# Patient Record
Sex: MALE | Race: WHITE | Employment: OTHER | URBAN - METROPOLITAN AREA
[De-identification: names, ages, dates, MRNs, and addresses within clinical notes are randomized per-mention and may not be internally consistent; named-entity substitution may affect disease eponyms.]

---

## 2017-05-04 ENCOUNTER — HOSPITAL ENCOUNTER (EMERGENCY)
Facility: HOSPITAL | Age: 67
Discharge: HOME | End: 2017-05-04
Attending: EMERGENCY MEDICINE
Payer: COMMERCIAL

## 2017-05-04 ENCOUNTER — HOSPITAL ENCOUNTER (OUTPATIENT)
Age: 67
Discharge: STILL ADMITTED OR ADMINISTRATIVE | End: 2017-05-04
Payer: MEDICARE

## 2017-05-04 ENCOUNTER — APPOINTMENT (OUTPATIENT)
Dept: CT IMAGING | Facility: HOSPITAL | Age: 67
End: 2017-05-04
Attending: EMERGENCY MEDICINE
Payer: COMMERCIAL

## 2017-05-04 VITALS
DIASTOLIC BLOOD PRESSURE: 66 MMHG | HEART RATE: 72 BPM | TEMPERATURE: 98 F | WEIGHT: 210 LBS | BODY MASS INDEX: 30.06 KG/M2 | OXYGEN SATURATION: 97 % | HEIGHT: 70 IN | RESPIRATION RATE: 18 BRPM | SYSTOLIC BLOOD PRESSURE: 116 MMHG

## 2017-05-04 DIAGNOSIS — K52.9 ACUTE COLITIS: ICD-10-CM

## 2017-05-04 DIAGNOSIS — R10.9 ABDOMINAL PAIN OF UNKNOWN ETIOLOGY: Primary | ICD-10-CM

## 2017-05-04 PROCEDURE — 81003 URINALYSIS AUTO W/O SCOPE: CPT | Performed by: EMERGENCY MEDICINE

## 2017-05-04 PROCEDURE — 99285 EMERGENCY DEPT VISIT HI MDM: CPT

## 2017-05-04 PROCEDURE — 80048 BASIC METABOLIC PNL TOTAL CA: CPT | Performed by: EMERGENCY MEDICINE

## 2017-05-04 PROCEDURE — 36415 COLL VENOUS BLD VENIPUNCTURE: CPT

## 2017-05-04 PROCEDURE — 74177 CT ABD & PELVIS W/CONTRAST: CPT | Performed by: EMERGENCY MEDICINE

## 2017-05-04 PROCEDURE — 85025 COMPLETE CBC W/AUTO DIFF WBC: CPT | Performed by: EMERGENCY MEDICINE

## 2017-05-04 RX ORDER — CIPROFLOXACIN 500 MG/1
500 TABLET, FILM COATED ORAL 2 TIMES DAILY
Qty: 20 TABLET | Refills: 0 | Status: SHIPPED | OUTPATIENT
Start: 2017-05-04 | End: 2017-05-14

## 2017-05-04 RX ORDER — METRONIDAZOLE 500 MG/1
500 TABLET ORAL 3 TIMES DAILY
Qty: 30 TABLET | Refills: 0 | Status: SHIPPED | OUTPATIENT
Start: 2017-05-04 | End: 2017-05-14

## 2017-05-04 NOTE — ED NOTES
Pt standing at bedside, appear in no apparent distress, awaiting consult with GI. No new complaints at this time.

## 2017-05-04 NOTE — ED PROVIDER NOTES
Patient Seen in: [facility] AND [facility] Emergency Department    History   Patient presents with:  Abdomen/Flank Pain (GI/)    Stated Complaint: abd pain s/p colonoscopy    HPI    Patient presents the emergency department complaining of left-sided abdominal Current:/66 mmHg  Pulse 72  Temp(Src) 97.8 °F (36.6 °C) (Oral)  Resp 18  Ht 177.8 cm (5' 10\")  Wt 95.255 kg  BMI 30.13 kg/m2  SpO2 97%        Physical Exam   Constitutional: He is oriented to person, place, and time.  He appears well-developed Viewed and reviewed by myself and findings discussed with patient including need for follow up  I spoke with gastroenterology to perform this procedure and relayed the  CT findings to him.   He recommended placing her on Cipro and Flagyl and having follow-u

## 2017-05-04 NOTE — ED NOTES
Discharge instructions given and reviewed with patient and family member, told to follow up with pmd and GI as advised, medication as told side effects discussed. Told to avoid alcohol while taking metronidazole.   Will return or seek help with any new or

## 2017-08-04 ENCOUNTER — APPOINTMENT (OUTPATIENT)
Dept: CT IMAGING | Facility: HOSPITAL | Age: 67
End: 2017-08-04
Attending: EMERGENCY MEDICINE
Payer: COMMERCIAL

## 2017-08-04 ENCOUNTER — HOSPITAL ENCOUNTER (OUTPATIENT)
Age: 67
Discharge: ANOTHER HOSPITAL | End: 2017-08-04
Attending: EMERGENCY MEDICINE
Payer: COMMERCIAL

## 2017-08-04 ENCOUNTER — HOSPITAL ENCOUNTER (EMERGENCY)
Facility: HOSPITAL | Age: 67
Discharge: HOME | End: 2017-08-04
Attending: EMERGENCY MEDICINE
Payer: COMMERCIAL

## 2017-08-04 VITALS
HEIGHT: 71 IN | HEART RATE: 78 BPM | RESPIRATION RATE: 18 BRPM | SYSTOLIC BLOOD PRESSURE: 122 MMHG | WEIGHT: 203 LBS | TEMPERATURE: 98 F | BODY MASS INDEX: 28.42 KG/M2 | DIASTOLIC BLOOD PRESSURE: 57 MMHG | OXYGEN SATURATION: 96 %

## 2017-08-04 VITALS
WEIGHT: 204 LBS | DIASTOLIC BLOOD PRESSURE: 56 MMHG | RESPIRATION RATE: 20 BRPM | BODY MASS INDEX: 28.56 KG/M2 | HEIGHT: 71 IN | OXYGEN SATURATION: 96 % | SYSTOLIC BLOOD PRESSURE: 137 MMHG | TEMPERATURE: 98 F | HEART RATE: 69 BPM

## 2017-08-04 DIAGNOSIS — R59.0 LYMPHADENOPATHY, POSTAURICULAR: Primary | ICD-10-CM

## 2017-08-04 DIAGNOSIS — R51.9 NONINTRACTABLE HEADACHE, UNSPECIFIED CHRONICITY PATTERN, UNSPECIFIED HEADACHE TYPE: Primary | ICD-10-CM

## 2017-08-04 LAB
BASOPHILS # BLD: 0.1 K/UL (ref 0–0.2)
BASOPHILS NFR BLD: 1 %
EOSINOPHIL # BLD: 0 K/UL (ref 0–0.7)
EOSINOPHIL NFR BLD: 0 %
ERYTHROCYTE [DISTWIDTH] IN BLOOD BY AUTOMATED COUNT: 13.6 % (ref 11–15)
HCT VFR BLD AUTO: 48.7 % (ref 41–52)
HGB BLD-MCNC: 16.7 G/DL (ref 13.5–17.5)
LYMPHOCYTES # BLD: 1.3 K/UL (ref 1–4)
LYMPHOCYTES NFR BLD: 14 %
MCH RBC QN AUTO: 32.5 PG (ref 27–32)
MCHC RBC AUTO-ENTMCNC: 34.3 G/DL (ref 32–37)
MCV RBC AUTO: 94.7 FL (ref 80–100)
MONOCYTES # BLD: 0.6 K/UL (ref 0–1)
MONOCYTES NFR BLD: 7 %
NEUTROPHILS # BLD AUTO: 7 K/UL (ref 1.8–7.7)
NEUTROPHILS NFR BLD: 78 %
PLATELET # BLD AUTO: 211 K/UL (ref 140–400)
PMV BLD AUTO: 8 FL (ref 7.4–10.3)
RBC # BLD AUTO: 5.14 M/UL (ref 4.5–5.9)
WBC # BLD AUTO: 9 K/UL (ref 4–11)

## 2017-08-04 PROCEDURE — 86618 LYME DISEASE ANTIBODY: CPT | Performed by: EMERGENCY MEDICINE

## 2017-08-04 PROCEDURE — 36415 COLL VENOUS BLD VENIPUNCTURE: CPT

## 2017-08-04 PROCEDURE — 70486 CT MAXILLOFACIAL W/O DYE: CPT | Performed by: EMERGENCY MEDICINE

## 2017-08-04 PROCEDURE — 99213 OFFICE O/P EST LOW 20 MIN: CPT

## 2017-08-04 PROCEDURE — 99284 EMERGENCY DEPT VISIT MOD MDM: CPT

## 2017-08-04 PROCEDURE — 85025 COMPLETE CBC W/AUTO DIFF WBC: CPT | Performed by: EMERGENCY MEDICINE

## 2017-08-04 PROCEDURE — 99215 OFFICE O/P EST HI 40 MIN: CPT

## 2017-08-04 NOTE — ED NOTES
Patient sent from immediate care complains of a lump on right side of neck that started last night. Per pt, \"lump is smaller today than last night\". Per pt, had chills last night, but didn't check his fever.  Checked fever before coming here, states that

## 2017-08-04 NOTE — ED NOTES
Seen and examined by dr Kirstin Nelson, pt to go to ed for further eval and management, report called to [address] Suzy Jerez, rn

## 2017-08-04 NOTE — ED NOTES
Pt also c/o tactile fever, also c/o muscle soreness to left arm, + skin redness to left lower leg poss insect bite

## 2017-08-04 NOTE — ED PROVIDER NOTES
Patient Seen in: [facility] [place]    History   Patient presents with:  Headache (neurologic)    Stated Complaint: bump back neck    HPI    Patient is a 76-year-old male who states last night he had a rather severe headache.   It mastoid area. Right Ear: External ear normal.   Left Ear: External ear normal.   Nose: Nose normal.   Mouth/Throat: Oropharynx is clear and moist.   Eyes: Conjunctivae and EOM are normal. Pupils are equal, round, and reactive to light.    Neck: Neck supp

## 2017-08-04 NOTE — ED INITIAL ASSESSMENT (HPI)
Sent from [facility] with fever, dizziness right side of neck lump, possible cellulitis to left lower leg due to insect bite, Dr Nicole Moralse requested CT of sinus, also c/o left shoulder pain

## 2017-08-04 NOTE — ED PROVIDER NOTES
Patient Seen in: [facility] AND [facility] Emergency Department    History   Patient presents with:  Cough/URI    Stated Complaint: Sent from [facility] with fever, dizziness right side of neck lump, possible celluliti*    HPI    Patient is a 70-year-old male who was s Current:/56   Pulse 69   Temp 97.9 °F (36.6 °C) (Oral)   Resp 20   Ht 180.3 cm (5' 11\")   Wt 92.5 kg   SpO2 96%   BMI 28.45 kg/m²         Physical Exam   Constitutional: He is oriented to person, place, and time.  He appears well-developed and well-n Disposition and Plan     Clinical Impression:  Lymphadenopathy, postauricular  (primary encounter diagnosis)    Disposition:  Discharge    Follow-up:  Jeana Owens MD  [address]  [address] [facility] [phone]    In 3 days        Medicatio

## 2017-08-08 LAB — B BURGDOR IGG+IGM SER QL: NEGATIVE

## 2018-02-26 ENCOUNTER — OFFICE VISIT (OUTPATIENT)
Dept: DERMATOLOGY CLINIC | Facility: CLINIC | Age: 68
End: 2018-02-26

## 2018-02-26 DIAGNOSIS — L40.9 PSORIASIS: Primary | ICD-10-CM

## 2018-02-26 DIAGNOSIS — L70.8 OTHER ACNE: ICD-10-CM

## 2018-02-26 DIAGNOSIS — B35.3 TINEA PEDIS OF RIGHT FOOT: ICD-10-CM

## 2018-02-26 DIAGNOSIS — L40.8 INVERSE PSORIASIS: ICD-10-CM

## 2018-02-26 DIAGNOSIS — L30.9 DERMATITIS: ICD-10-CM

## 2018-02-26 PROCEDURE — 99204 OFFICE O/P NEW MOD 45 MIN: CPT | Performed by: DERMATOLOGY

## 2018-02-26 PROCEDURE — G0463 HOSPITAL OUTPT CLINIC VISIT: HCPCS | Performed by: DERMATOLOGY

## 2018-02-26 RX ORDER — TERBINAFINE HYDROCHLORIDE 250 MG/1
250 TABLET ORAL DAILY
Qty: 14 TABLET | Refills: 0 | Status: SHIPPED | OUTPATIENT
Start: 2018-02-26 | End: 2018-03-12

## 2018-02-26 RX ORDER — DOXYCYCLINE HYCLATE 100 MG/1
100 CAPSULE ORAL 2 TIMES DAILY
Qty: 60 CAPSULE | Refills: 6 | Status: SHIPPED | OUTPATIENT
Start: 2018-02-26 | End: 2018-03-28

## 2018-02-26 RX ORDER — TACROLIMUS 1 MG/G
OINTMENT TOPICAL
Qty: 100 G | Refills: 12 | Status: SHIPPED | OUTPATIENT
Start: 2018-02-26 | End: 2021-02-01

## 2018-02-26 RX ORDER — FLUOCINONIDE 0.5 MG/ML
SOLUTION TOPICAL
Qty: 60 ML | Refills: 12 | Status: SHIPPED | OUTPATIENT
Start: 2018-02-26 | End: 2018-12-14

## 2018-03-11 NOTE — PROGRESS NOTES
Moody Yap is a 75year old male. HPI:     CC:  Patient presents with:  Rash: New pt presenting with rash to scalp, posterior neck, groin area, bilateral under arms and legs. c/o redness, swelling, hairloss, and itching.   Previously halobetasol and enb Occupational History  None on file     Social History Main Topics   Smoking status: Heavy Tobacco Smoker  1.00 Packs/day  For 40.00 Years     Smokeless tobacco: Current User    Alcohol use Yes  1.2 oz/week    2 Cans of beer per week         Drug use: N History, medications, allergies reviewed as noted. ROS:  Denies any other systemic complaints. No new or changeing lesions other than noted above. No fevers, chills, night sweats, unusual sun sensitivity. No other skin complaints.         History, me Fluocinonide 0.05 % External Solution 60 mL 12      Sig: Use bid to scalp             Psoriasis  (primary encounter diagnosis)  Inverse psoriasis  Dermatitis  Tinea pedis of right foot  Other acne    See details on map.       Remarkable for:    Psoriasis

## 2018-03-15 ENCOUNTER — LAB ENCOUNTER (OUTPATIENT)
Dept: LAB | Age: 68
End: 2018-03-15
Attending: INTERNAL MEDICINE
Payer: MEDICARE

## 2018-03-15 DIAGNOSIS — E55.9 VITAMIN D DEFICIENCY: ICD-10-CM

## 2018-03-15 DIAGNOSIS — Z12.5 PROSTATE CANCER SCREENING: ICD-10-CM

## 2018-03-15 DIAGNOSIS — Z00.00 PHYSICAL EXAM, ANNUAL: ICD-10-CM

## 2018-03-15 LAB
ALBUMIN SERPL BCP-MCNC: 4.3 G/DL (ref 3.5–4.8)
ALBUMIN/GLOB SERPL: 1.6 {RATIO} (ref 1–2)
ALP SERPL-CCNC: 91 U/L (ref 32–100)
ALT SERPL-CCNC: 20 U/L (ref 17–63)
ANION GAP SERPL CALC-SCNC: 10 MMOL/L (ref 0–18)
AST SERPL-CCNC: 23 U/L (ref 15–41)
BASOPHILS # BLD: 0.1 K/UL (ref 0–0.2)
BASOPHILS NFR BLD: 1 %
BILIRUB SERPL-MCNC: 0.9 MG/DL (ref 0.3–1.2)
BUN SERPL-MCNC: 16 MG/DL (ref 8–20)
BUN/CREAT SERPL: 15.8 (ref 10–20)
CALCIUM SERPL-MCNC: 9.5 MG/DL (ref 8.5–10.5)
CHLORIDE SERPL-SCNC: 103 MMOL/L (ref 95–110)
CHOLEST SERPL-MCNC: 186 MG/DL (ref 110–200)
CO2 SERPL-SCNC: 25 MMOL/L (ref 22–32)
CREAT SERPL-MCNC: 1.01 MG/DL (ref 0.5–1.5)
EOSINOPHIL # BLD: 0.3 K/UL (ref 0–0.7)
EOSINOPHIL NFR BLD: 4 %
ERYTHROCYTE [DISTWIDTH] IN BLOOD BY AUTOMATED COUNT: 13.1 % (ref 11–15)
GLOBULIN PLAS-MCNC: 2.7 G/DL (ref 2.5–3.7)
GLUCOSE SERPL-MCNC: 82 MG/DL (ref 70–99)
HCT VFR BLD AUTO: 52.2 % (ref 41–52)
HDLC SERPL-MCNC: 31 MG/DL
HGB BLD-MCNC: 18 G/DL (ref 13.5–17.5)
LDLC SERPL CALC-MCNC: 128 MG/DL (ref 0–99)
LYMPHOCYTES # BLD: 2.2 K/UL (ref 1–4)
LYMPHOCYTES NFR BLD: 30 %
MCH RBC QN AUTO: 32.9 PG (ref 27–32)
MCHC RBC AUTO-ENTMCNC: 34.6 G/DL (ref 32–37)
MCV RBC AUTO: 95.3 FL (ref 80–100)
MONOCYTES # BLD: 0.7 K/UL (ref 0–1)
MONOCYTES NFR BLD: 9 %
NEUTROPHILS # BLD AUTO: 4.2 K/UL (ref 1.8–7.7)
NEUTROPHILS NFR BLD: 56 %
NONHDLC SERPL-MCNC: 155 MG/DL
OSMOLALITY UR CALC.SUM OF ELEC: 286 MOSM/KG (ref 275–295)
PATIENT FASTING: YES
PLATELET # BLD AUTO: 289 K/UL (ref 140–400)
PMV BLD AUTO: 8.4 FL (ref 7.4–10.3)
POTASSIUM SERPL-SCNC: 4.2 MMOL/L (ref 3.3–5.1)
PROT SERPL-MCNC: 7 G/DL (ref 5.9–8.4)
PSA SERPL-MCNC: 0.9 NG/ML (ref 0–4)
RBC # BLD AUTO: 5.47 M/UL (ref 4.5–5.9)
SODIUM SERPL-SCNC: 138 MMOL/L (ref 136–144)
TRIGL SERPL-MCNC: 136 MG/DL (ref 1–149)
TSH SERPL-ACNC: 3.09 UIU/ML (ref 0.45–5.33)
WBC # BLD AUTO: 7.5 K/UL (ref 4–11)

## 2018-03-15 PROCEDURE — 82306 VITAMIN D 25 HYDROXY: CPT

## 2018-03-15 PROCEDURE — 85025 COMPLETE CBC W/AUTO DIFF WBC: CPT

## 2018-03-15 PROCEDURE — 80061 LIPID PANEL: CPT

## 2018-03-15 PROCEDURE — 36415 COLL VENOUS BLD VENIPUNCTURE: CPT

## 2018-03-15 PROCEDURE — 80053 COMPREHEN METABOLIC PANEL: CPT

## 2018-03-15 PROCEDURE — 84443 ASSAY THYROID STIM HORMONE: CPT

## 2018-03-16 LAB — 25(OH)D3 SERPL-MCNC: 18.6 NG/ML

## 2018-03-19 ENCOUNTER — OFFICE VISIT (OUTPATIENT)
Dept: DERMATOLOGY CLINIC | Facility: CLINIC | Age: 68
End: 2018-03-19

## 2018-03-19 DIAGNOSIS — L40.9 PSORIASIS: ICD-10-CM

## 2018-03-19 DIAGNOSIS — L40.8 INVERSE PSORIASIS: Primary | ICD-10-CM

## 2018-03-19 DIAGNOSIS — L70.8 OTHER ACNE: ICD-10-CM

## 2018-03-19 DIAGNOSIS — D23.9 BENIGN NEOPLASM OF SKIN, UNSPECIFIED LOCATION: ICD-10-CM

## 2018-03-19 DIAGNOSIS — L70.0 ACNE VULGARIS: ICD-10-CM

## 2018-03-19 PROCEDURE — 99213 OFFICE O/P EST LOW 20 MIN: CPT | Performed by: DERMATOLOGY

## 2018-03-19 PROCEDURE — G0463 HOSPITAL OUTPT CLINIC VISIT: HCPCS | Performed by: DERMATOLOGY

## 2018-03-19 RX ORDER — CLOBETASOL PROPIONATE 0.46 MG/ML
1 SOLUTION TOPICAL 2 TIMES DAILY
Qty: 50 ML | Refills: 6 | Status: SHIPPED | OUTPATIENT
Start: 2018-03-19 | End: 2018-06-11

## 2018-03-19 RX ORDER — CLOBETASOL PROPIONATE 0.46 MG/ML
1 SOLUTION TOPICAL 2 TIMES DAILY
Qty: 50 ML | Refills: 6 | Status: SHIPPED | OUTPATIENT
Start: 2018-03-19 | End: 2018-03-19

## 2018-04-01 NOTE — PROGRESS NOTES
Red Davis is a 75year old male. HPI:     CC:  Patient presents with:  Psoriasis: LOV: 02/26/18. Pt presents for f/u psoriasis, pt states he sees no changes in condition. Pt states one of the oral medications is \"causing insomnia\".         Allergies: file     Other Topics Concern    Caffeine Concern Yes    Comment: soda 4 cans a day    Exercise No    Seat Belt No    Special Diet No    Stress Concern No    Weight Concern No    Pt has a pacemaker No    Pt has a defibrillator No    Reaction to local anest sweats, unusual sun sensitivity. No other skin complaints. History, medications, allergies reviewed as noted. Physical Examination:     Well-developed well-nourished patient alert oriented in no acute distress.   Exam total-body performed, inc possibly resuming Enbrel discussed. Patient does not wish to be on Biologics presently. Discussed pros and cons of other systemic medications including Otezla. If worsening consider with secondary folliculitis.   Aggressive management of scalp add doxycy needed

## 2018-04-06 ENCOUNTER — TELEPHONE (OUTPATIENT)
Dept: DERMATOLOGY CLINIC | Facility: CLINIC | Age: 68
End: 2018-04-06

## 2018-04-06 NOTE — TELEPHONE ENCOUNTER
Received letter stating the insurance is only coving a 30 day supply of Tacrolimus. Letter placed in [address] box for review.

## 2018-04-30 ENCOUNTER — OFFICE VISIT (OUTPATIENT)
Dept: DERMATOLOGY CLINIC | Facility: CLINIC | Age: 68
End: 2018-04-30

## 2018-04-30 VITALS — HEIGHT: 71 IN | WEIGHT: 217 LBS | BODY MASS INDEX: 30.38 KG/M2

## 2018-04-30 DIAGNOSIS — L70.8 OTHER ACNE: ICD-10-CM

## 2018-04-30 DIAGNOSIS — L40.9 PSORIASIS: Primary | ICD-10-CM

## 2018-04-30 DIAGNOSIS — L40.8 INVERSE PSORIASIS: ICD-10-CM

## 2018-04-30 PROCEDURE — G0463 HOSPITAL OUTPT CLINIC VISIT: HCPCS | Performed by: DERMATOLOGY

## 2018-04-30 PROCEDURE — 99214 OFFICE O/P EST MOD 30 MIN: CPT | Performed by: DERMATOLOGY

## 2018-04-30 RX ORDER — CLOBETASOL PROPIONATE 0.05 MG/G
GEL TOPICAL
Qty: 45 G | Refills: 1 | Status: SHIPPED | OUTPATIENT
Start: 2018-04-30 | End: 2021-02-01

## 2018-04-30 RX ORDER — ERGOCALCIFEROL 1.25 MG/1
50000 CAPSULE ORAL WEEKLY
Qty: 4 CAPSULE | Refills: 6 | Status: SHIPPED | OUTPATIENT
Start: 2018-04-30 | End: 2018-05-30

## 2018-04-30 RX ORDER — FLUCONAZOLE 100 MG/1
TABLET ORAL
Qty: 12 TABLET | Refills: 0 | Status: SHIPPED | OUTPATIENT
Start: 2018-04-30 | End: 2018-08-06

## 2018-04-30 NOTE — PATIENT INSTRUCTIONS
fluconazole 3 times a week for ithcing in groin--continue tacrolimus  Vit d 50,000iu weekly  Clobetasol gel for scalp    Ok to use halobetasol    con't fluocinonide or clobetasol soln every few days for scalp

## 2018-05-13 NOTE — PROGRESS NOTES
Alda Roberts is a 75year old male. HPI:     CC:  Patient presents with:  Derm Problem: Pt presents with 6 week f/u on rash. Pt verbalized no change. No relief with oral medication. Shampoo helped scalps.  Concerns with lesions around the scalp line and b status: Heavy Tobacco Smoker  1.00 Packs/day  For 40.00 Years     Smokeless tobacco: Current User    Alcohol use Yes  1.2 oz/week    2 Cans of beer per week         Drug use: No    Sexual activity: Not on file     Other Topics Concern    Caffeine Concern Y flare in the past was on steroids, multiple topicals treated with Enbrel has been off for the past 6 years. Denies significant arthritis associated. Intermittent flareups managed with Ultravate. In September 2017 had fevers chills swelling.   Had more se Some fissuring in the crease . Elbows knees arms significantly improved. Few psoriasiform patches. Feet with minimal erythema and scale toenails are abnormal but improved.       Scattered follicular papules acneiform and crusts patchy areas of alopecia o apply the fluocinonide all over. More rosacea-like changes on face somewhat improved. Currently his psoriasis  10-15% body surface involvement. Will treat with medications in the grid. Overall skincare, liberal use of emollients discussed.   Consider mo suspicious lesions    Patient will be outdoors a great deal over the summer encourage sun protection of the face and ears. Benign nevi, seborrheic  keratoses, cherry angiomas:  Reassurance regarding other benign skin lesions. Signs and symptoms of skin c

## 2018-06-11 ENCOUNTER — OFFICE VISIT (OUTPATIENT)
Dept: DERMATOLOGY CLINIC | Facility: CLINIC | Age: 68
End: 2018-06-11

## 2018-06-11 DIAGNOSIS — L40.9 PSORIASIS: Primary | ICD-10-CM

## 2018-06-11 PROCEDURE — G0463 HOSPITAL OUTPT CLINIC VISIT: HCPCS | Performed by: DERMATOLOGY

## 2018-06-11 PROCEDURE — 99213 OFFICE O/P EST LOW 20 MIN: CPT | Performed by: DERMATOLOGY

## 2018-06-11 RX ORDER — CLOBETASOL PROPIONATE 0.46 MG/ML
SOLUTION TOPICAL
Qty: 50 ML | Refills: 6 | Status: SHIPPED | OUTPATIENT
Start: 2018-06-11 | End: 2018-08-06

## 2018-06-11 NOTE — PROGRESS NOTES
HPI:     Chief Complaint     Rash        HPI     Rash    Additional comments: LOV 04/30/18 pt was seen for f/u rash here today for f/u on rash states that the back of the neck is better scalp still a little itchy, groin still the same treats it as needed, History  Social History   Marital status:   Spouse name: N/A    Years of education: N/A  Number of children: N/A     Occupational History  None on file     Social History Main Topics   Smoking status: Heavy Tobacco Smoker  1.00 Packs/day  For 40.00 encounter.         6/11/2018  [facility]

## 2018-07-26 PROBLEM — M67.432 GANGLION CYST OF WRIST, LEFT: Status: ACTIVE | Noted: 2018-07-26

## 2018-08-06 ENCOUNTER — OFFICE VISIT (OUTPATIENT)
Dept: DERMATOLOGY CLINIC | Facility: CLINIC | Age: 68
End: 2018-08-06

## 2018-08-06 DIAGNOSIS — L40.9 PSORIASIS: Primary | ICD-10-CM

## 2018-08-06 DIAGNOSIS — L40.8 INVERSE PSORIASIS: ICD-10-CM

## 2018-08-06 PROCEDURE — G0463 HOSPITAL OUTPT CLINIC VISIT: HCPCS | Performed by: DERMATOLOGY

## 2018-08-06 PROCEDURE — 99213 OFFICE O/P EST LOW 20 MIN: CPT | Performed by: DERMATOLOGY

## 2018-08-06 RX ORDER — FLUCONAZOLE 100 MG/1
TABLET ORAL
Qty: 7 TABLET | Refills: 0 | Status: SHIPPED | OUTPATIENT
Start: 2018-08-06 | End: 2018-11-05

## 2018-08-06 RX ORDER — CLOBETASOL PROPIONATE 0.46 MG/ML
SOLUTION TOPICAL
Qty: 50 ML | Refills: 6 | Status: SHIPPED | OUTPATIENT
Start: 2018-08-06 | End: 2021-02-01

## 2018-08-06 RX ORDER — HALOBETASOL PROPIONATE 0.05 %
0.5 OINTMENT (GRAM) TOPICAL 2 TIMES DAILY PRN
Qty: 50 G | Refills: 2 | Status: SHIPPED | OUTPATIENT
Start: 2018-08-06 | End: 2018-12-14

## 2018-08-19 NOTE — PROGRESS NOTES
Teo German is a 75year old male. HPI:     CC:  Patient presents with:  Psoriasis: LOV 6/2018. Pt here for follow up poriasis. Flaring to scalp, groin, left shin, bilateral elbows, and right ankle.  Managing with clobetasol solution to scalp, and clobetas Marital status:   Spouse name: N/A    Years of education: N/A  Number of children: N/A     Occupational History  None on file     Social History Main Topics   Smoking status: Heavy Tobacco Smoker  1.00 Packs/day  For 40.00 Years     Smokeless tobacc topicals and prior p.o. terbinafine. Groin still mildly red and irritated. Using halobetasol to psoriasis. Fluocinonide solution to the scalp.       As noted previously initial office visit initial history:  history of Erythematous r scaling eruption for lesions as noted on map.   See details of examination  See Assessment /Plan for additional history and physical exam also:    Erythematous psoriasiform plaques markedly improved over the axilla, groin, mild erythema diffusely over the inguinal creases hyper possibly resuming Enbrel discussed. Discussed possible Otezla patient does not wish to be on Biologics presently. Discussed pros and cons of other systemic medications including Otezla. If worsening consider.   The fact this likely will take several monty Folliculitis of scalp and rosacea acneiform lesions scattered may recur intermittently discussed  Please refer to map for specific lesions. See additional diagnoses. Pros cons of various therapies, risks benefits discussed. Pathophysiology discussed with

## 2018-09-21 ENCOUNTER — OFFICE VISIT (OUTPATIENT)
Dept: DERMATOLOGY CLINIC | Facility: CLINIC | Age: 68
End: 2018-09-21

## 2018-09-21 DIAGNOSIS — L40.8 OTHER PSORIASIS: Primary | ICD-10-CM

## 2018-09-21 DIAGNOSIS — Z51.81 MEDICATION MONITORING ENCOUNTER: ICD-10-CM

## 2018-09-21 PROCEDURE — 99214 OFFICE O/P EST MOD 30 MIN: CPT | Performed by: DERMATOLOGY

## 2018-10-01 NOTE — PROGRESS NOTES
Chris Méndez is a 75year old male. HPI:     CC:  Patient presents with:  Psoriasis: LOV: 8-6-18.  Pt presents today for 1 month f/u currently using Clobetasol prop solution, halobetasol prop oint , clobetasol prop gel, econazole nitrate cream, fluconzole, Socioeconomic History      Marital status:       Spouse name: Not on file      Number of children: Not on file      Years of education: Not on file      Highest education level: Not on file    Social Needs      Financial resource strain: Not on file goes somewhat patient off doxycycline  Has noticed getting some new lesions. on the scalp. Overall the scalp is better. psoriasis: Generally uses the topicals every other day for his psoriasis on scalp.   Inverse psoriasis flaring intermittently especial concerns above. Patient has been in their usual state of health. History, medications, allergies reviewed as noted. ROS:  Denies any other systemic complaints. No new or changeing lesions other than noted above.  No fevers, chills, night sweats, u shoulders elbows back thicker nodules on the scalp now on the face.   Fissured and erythematous patches at inguinal creases    Erythema acneiform papules over the face nose less than previously noted somewhat improved at least stable    Assessment / plan: clinical response to above therapy. Recheck in 3 months if no improvement. Currently no significant arthritis. Nail changes likely fungal improved will see if the halobetasol is covered again.   Patient felt this is working better     Also irritated area prescription for this. No other suspicious lesions\    Baseline labs ordered. No active AK's no other suspicious lesions    Benign nevi, seborrheic  keratoses, cherry angiomas:  Reassurance regarding other benign skin lesions. Signs and symptoms of sk

## 2018-10-03 ENCOUNTER — TELEPHONE (OUTPATIENT)
Dept: DERMATOLOGY CLINIC | Facility: CLINIC | Age: 68
End: 2018-10-03

## 2018-10-03 NOTE — TELEPHONE ENCOUNTER
lov 9/21/18. Patient states he's not going to get humira injections.  Please advise if you want us to get further info from pt

## 2018-10-04 NOTE — TELEPHONE ENCOUNTER
Patient \"completely happy\" with topical medication now. Using Halobetasol. Per pt, humria was 1000$ per shot. He doesn't want to see if we can get med cheaper. F/u in dec.

## 2018-10-04 NOTE — TELEPHONE ENCOUNTER
[address] [address]   Any reason in particular like cost, side effects, etc? Does he want to continue just topicals? He was very unhappy with just topicals alone before. More info please.

## 2018-12-14 ENCOUNTER — OFFICE VISIT (OUTPATIENT)
Dept: DERMATOLOGY CLINIC | Facility: CLINIC | Age: 68
End: 2018-12-14

## 2018-12-14 DIAGNOSIS — L40.8 INVERSE PSORIASIS: ICD-10-CM

## 2018-12-14 DIAGNOSIS — L40.9 PSORIASIS: Primary | ICD-10-CM

## 2018-12-14 DIAGNOSIS — Z51.81 MEDICATION MONITORING ENCOUNTER: ICD-10-CM

## 2018-12-14 PROCEDURE — G0463 HOSPITAL OUTPT CLINIC VISIT: HCPCS | Performed by: DERMATOLOGY

## 2018-12-14 PROCEDURE — 99214 OFFICE O/P EST MOD 30 MIN: CPT | Performed by: DERMATOLOGY

## 2018-12-14 RX ORDER — ERGOCALCIFEROL 1.25 MG/1
CAPSULE ORAL
Qty: 12 CAPSULE | Refills: 6 | Status: SHIPPED | OUTPATIENT
Start: 2018-12-14 | End: 2021-02-01

## 2018-12-14 RX ORDER — HALOBETASOL PROPIONATE 0.05 %
0.5 OINTMENT (GRAM) TOPICAL 2 TIMES DAILY PRN
Qty: 50 G | Refills: 2 | Status: SHIPPED | OUTPATIENT
Start: 2018-12-14 | End: 2021-02-01

## 2018-12-14 RX ORDER — FLUOCINONIDE 0.5 MG/ML
SOLUTION TOPICAL
Qty: 60 ML | Refills: 12 | Status: ON HOLD | OUTPATIENT
Start: 2018-12-14 | End: 2021-02-15

## 2018-12-24 NOTE — PROGRESS NOTES
Olga Lidia Mena is a 70year old male. HPI:     CC:  Patient presents with:  Psoriasis: LOV 9/21/18. Patient presents for f/u of psoriasis. Patient continues to use halobetasol, clobetasol gel and soultion with some improvement.  Patient flaring to daniel hands x Allergies:   No Known Allergies    History reviewed. No pertinent past medical history. History reviewed. No pertinent surgical history.   Social History    Socioeconomic History      Marital status:       Spouse name: Not on file      Number of patches. Increased flaking to scalp. Patient concerned and requesting further treatment options. Humira too expensive per patient. Derm Problem: Requesting refills of halobetasol and Vit D cap. Flaring over the last month especially over the hands. irritation and hair loss. Somewhat improved. No further tender draining lesions    No new joint complaints. Patient presents with concerns above. Patient has been in their usual state of health. History, medications, allergies reviewed as noted. weekly   • triamcinolone acetonide 0.1 % External Cream 908 g 3     Sig: Apply topically 2 (two) times daily. Psoriasis  (primary encounter diagnosis)  Inverse psoriasis  Medication monitoring encounter    See details on map.       Remarkable for: likely fungal improved will see if the halobetasol is covered again. Patient felt this is working better          Intertrigo stable consider repeating fluconazole 3 times weekly if worsening. Likely inverse psoriasis component.   This should help the nail mos or p.r.n.    35/40 min spent in pt education discussion of therapeutic options.      Encouraged recheck in 1 -2months pending patient's response chronic persistent nature of psoriasis discussed

## 2019-04-13 ENCOUNTER — LAB ENCOUNTER (OUTPATIENT)
Dept: LAB | Facility: HOSPITAL | Age: 69
End: 2019-04-13
Attending: DERMATOLOGY
Payer: MEDICARE

## 2019-04-13 DIAGNOSIS — L30.8 ACUTE VESICULAR DERMATITIS: Primary | ICD-10-CM

## 2019-04-13 PROCEDURE — 80053 COMPREHEN METABOLIC PANEL: CPT

## 2019-04-13 PROCEDURE — 85025 COMPLETE CBC W/AUTO DIFF WBC: CPT

## 2019-04-13 PROCEDURE — 36415 COLL VENOUS BLD VENIPUNCTURE: CPT

## 2019-04-13 PROCEDURE — 86480 TB TEST CELL IMMUN MEASURE: CPT

## 2019-05-14 ENCOUNTER — HOSPITAL ENCOUNTER (OUTPATIENT)
Dept: GENERAL RADIOLOGY | Facility: HOSPITAL | Age: 69
Discharge: HOME | End: 2019-05-14
Attending: INTERNAL MEDICINE
Payer: MEDICARE

## 2019-05-14 DIAGNOSIS — M54.2 NECK PAIN: ICD-10-CM

## 2019-05-14 PROCEDURE — 71046 X-RAY EXAM CHEST 2 VIEWS: CPT | Performed by: INTERNAL MEDICINE

## 2019-05-14 PROCEDURE — 72040 X-RAY EXAM NECK SPINE 2-3 VW: CPT | Performed by: INTERNAL MEDICINE

## 2019-06-12 ENCOUNTER — LAB ENCOUNTER (OUTPATIENT)
Dept: LAB | Facility: HOSPITAL | Age: 69
End: 2019-06-12
Attending: DERMATOLOGY
Payer: MEDICARE

## 2019-06-12 DIAGNOSIS — L30.8 ACUTE VESICULAR DERMATITIS: Primary | ICD-10-CM

## 2019-06-12 DIAGNOSIS — Z79.899 ENCOUNTER FOR LONG-TERM (CURRENT) USE OF OTHER MEDICATIONS: ICD-10-CM

## 2019-06-12 PROCEDURE — 36415 COLL VENOUS BLD VENIPUNCTURE: CPT

## 2019-06-12 PROCEDURE — 80053 COMPREHEN METABOLIC PANEL: CPT

## 2019-06-12 PROCEDURE — 85025 COMPLETE CBC W/AUTO DIFF WBC: CPT

## 2020-11-20 ENCOUNTER — HOSPITAL ENCOUNTER (OUTPATIENT)
Dept: GENERAL RADIOLOGY | Facility: HOSPITAL | Age: 70
Discharge: HOME | End: 2020-11-20
Attending: INTERNAL MEDICINE
Payer: MEDICARE

## 2020-11-20 DIAGNOSIS — M54.32 LEFT SIDED SCIATICA: ICD-10-CM

## 2020-11-20 PROCEDURE — 72110 X-RAY EXAM L-2 SPINE 4/>VWS: CPT | Performed by: INTERNAL MEDICINE

## 2020-12-22 ENCOUNTER — HOSPITAL ENCOUNTER (OUTPATIENT)
Dept: MRI IMAGING | Facility: HOSPITAL | Age: 70
Discharge: HOME | End: 2020-12-22
Attending: INTERNAL MEDICINE
Payer: MEDICARE

## 2020-12-22 DIAGNOSIS — M54.9 DORSALGIA: ICD-10-CM

## 2020-12-22 DIAGNOSIS — M54.32 LEFT SIDED SCIATICA: ICD-10-CM

## 2020-12-22 DIAGNOSIS — M54.16 RADICULOPATHY OF LUMBAR REGION: ICD-10-CM

## 2020-12-22 PROCEDURE — 72148 MRI LUMBAR SPINE W/O DYE: CPT | Performed by: INTERNAL MEDICINE

## 2021-01-04 ENCOUNTER — OFFICE VISIT (OUTPATIENT)
Dept: NEUROLOGY | Facility: CLINIC | Age: 71
End: 2021-01-04
Payer: MEDICARE

## 2021-01-04 ENCOUNTER — TELEPHONE (OUTPATIENT)
Dept: NEUROLOGY | Facility: CLINIC | Age: 71
End: 2021-01-04

## 2021-01-04 VITALS — HEIGHT: 71 IN | BODY MASS INDEX: 28.84 KG/M2 | WEIGHT: 206 LBS

## 2021-01-04 DIAGNOSIS — R29.3 POOR POSTURE: ICD-10-CM

## 2021-01-04 DIAGNOSIS — G47.9 SLEEP DISTURBANCE: ICD-10-CM

## 2021-01-04 DIAGNOSIS — E78.5 DYSLIPIDEMIA: ICD-10-CM

## 2021-01-04 DIAGNOSIS — M54.16 CHRONIC LEFT LUMBAR RADICULOPATHY: Primary | ICD-10-CM

## 2021-01-04 DIAGNOSIS — M51.26 HNP (HERNIATED NUCLEUS PULPOSUS), LUMBAR: ICD-10-CM

## 2021-01-04 PROCEDURE — 3008F BODY MASS INDEX DOCD: CPT | Performed by: PHYSICAL MEDICINE & REHABILITATION

## 2021-01-04 PROCEDURE — 99204 OFFICE O/P NEW MOD 45 MIN: CPT | Performed by: PHYSICAL MEDICINE & REHABILITATION

## 2021-01-04 RX ORDER — GABAPENTIN 300 MG/1
CAPSULE ORAL
Qty: 90 CAPSULE | Refills: 0 | Status: SHIPPED | OUTPATIENT
Start: 2021-01-04 | End: 2021-01-25

## 2021-01-04 RX ORDER — GABAPENTIN 300 MG/1
300 CAPSULE ORAL 2 TIMES DAILY
COMMUNITY
Start: 2020-12-07 | End: 2021-01-25

## 2021-01-04 NOTE — TELEPHONE ENCOUNTER
[employer] for authorization of approval for Left L4-5 and L5-S1 TFESI under fluoroscopy cpt codes 07280-QB, 79566-TI, [id number]. Approval was given with Authorization Number: [id number] for one unit/DOS effective 01/04/21 to 07/03/21. Will inform Nursing.

## 2021-01-05 PROBLEM — M54.16 CHRONIC LEFT LUMBAR RADICULOPATHY: Status: ACTIVE | Noted: 2021-01-05

## 2021-01-05 PROBLEM — R29.3 POOR POSTURE: Status: ACTIVE | Noted: 2021-01-05

## 2021-01-05 PROBLEM — M51.26 HNP (HERNIATED NUCLEUS PULPOSUS), LUMBAR: Status: ACTIVE | Noted: 2021-01-05

## 2021-01-05 PROBLEM — G47.9 SLEEP DISTURBANCE: Status: ACTIVE | Noted: 2021-01-05

## 2021-01-05 NOTE — PROGRESS NOTES
[address] [facility]  NEW PATIENT EVALUATION    Consultation as a request of Dr. Munira Boyer    Chief Complaint: back pain.     HISTORY OF PRESENT ILLNESS:   Patient presents with:  New Patient: referred by Dr. Munira Boyer 6 w had any dedicated physical therapy recently but did have therapy in the past and did not notice any improvement in fact had worsening pain with physical therapy. PAST MEDICAL HISTORY:   History reviewed. No pertinent past medical history.       PAST VELEZ denies  Irregular Heartbeat: denies   Respiratory  Painful Breathing: denies  Wheezing: denies   Gastrointestinal  Bowel Incontinence: admits  Heartburn: denies  Abdominal Pain: denies  Blood in Stool : denies  Rectal Pain: denies   Hematology  Easy Bruisi all dermatomes of the lower extremities  Facet Loading: no specific facet pain  Straight leg raise: positive for radicular pain symptoms  Slump test: positive for pain symptoms for radicular pain symptoms      LABS:   No results found for: EAG, A1C  Lab Re with lumbar radiculopathy. His MRI imaging is notable for a large disc extrusion at L4-5 primarily responsible for his symptoms.   Given this I recommend a left L4 and L5 transforaminal epidural steroid injection under Frasca guidance under local anesthesi

## 2021-01-11 ENCOUNTER — OFFICE VISIT (OUTPATIENT)
Dept: SURGERY | Facility: CLINIC | Age: 71
End: 2021-01-11

## 2021-01-11 ENCOUNTER — TELEPHONE (OUTPATIENT)
Dept: NEUROLOGY | Facility: CLINIC | Age: 71
End: 2021-01-11

## 2021-01-11 DIAGNOSIS — M54.16 CHRONIC LEFT LUMBAR RADICULOPATHY: Primary | ICD-10-CM

## 2021-01-11 PROCEDURE — 64484 NJX AA&/STRD TFRM EPI L/S EA: CPT | Performed by: PHYSICAL MEDICINE & REHABILITATION

## 2021-01-11 PROCEDURE — 64483 NJX AA&/STRD TFRM EPI L/S 1: CPT | Performed by: PHYSICAL MEDICINE & REHABILITATION

## 2021-01-11 NOTE — PROCEDURES
[facility]    2-LEVEL LUMBAR TRANSFORAMINAL   NAME:  David Billy    MR #:    [id number] :  12/15/1950     PHYSICIAN:  Donald Crawford        Operative Report    DATE OF PROCEDURE: 2021   PREOPERATIVE DIAGNOSES: 1.  Chronic l given discharge instructions and will follow up in the clinic as scheduled. Throughout the whole procedure, the patient's pulse oximetry and vital signs were monitored and they remained completely stable.   Also, throughout the whole procedure, prior to in

## 2021-01-12 ENCOUNTER — TELEPHONE (OUTPATIENT)
Dept: NEUROLOGY | Facility: CLINIC | Age: 71
End: 2021-01-12

## 2021-01-12 DIAGNOSIS — M54.16 CHRONIC LEFT LUMBAR RADICULOPATHY: Primary | ICD-10-CM

## 2021-01-12 NOTE — TELEPHONE ENCOUNTER
Spoke with patient who states that he has quite some pain after injection. C/O pain in left thigh and calf pain. Rates pain 6-7/10 Advised patient to give injection some time. Patient verbalized understanding.

## 2021-01-15 NOTE — TELEPHONE ENCOUNTER
Spoke with patient over the phone.   He is endorsing some improvement in the pain in his foot however continues to have weakness in the knee and is having a difficult time functioning and ambulating since the epidural.  Although this is not new, his symptom

## 2021-01-15 NOTE — TELEPHONE ENCOUNTER
Patient called in regards to complications that arise after the injection. Patent ankles are giving up. Patient is using a wheelchair. Patient states that lumbar hurts more than before the injection.   Patient states that the the lower back pain now radiate

## 2021-01-25 ENCOUNTER — OFFICE VISIT (OUTPATIENT)
Dept: NEUROLOGY | Facility: CLINIC | Age: 71
End: 2021-01-25
Payer: MEDICARE

## 2021-01-25 VITALS
BODY MASS INDEX: 28.56 KG/M2 | HEART RATE: 90 BPM | HEIGHT: 71 IN | OXYGEN SATURATION: 97 % | WEIGHT: 204 LBS | DIASTOLIC BLOOD PRESSURE: 70 MMHG | SYSTOLIC BLOOD PRESSURE: 130 MMHG

## 2021-01-25 DIAGNOSIS — G47.9 SLEEP DISTURBANCE: ICD-10-CM

## 2021-01-25 DIAGNOSIS — E78.5 DYSLIPIDEMIA: ICD-10-CM

## 2021-01-25 DIAGNOSIS — R29.3 POOR POSTURE: ICD-10-CM

## 2021-01-25 DIAGNOSIS — M54.16 CHRONIC LEFT LUMBAR RADICULOPATHY: ICD-10-CM

## 2021-01-25 DIAGNOSIS — M51.26 HNP (HERNIATED NUCLEUS PULPOSUS), LUMBAR: ICD-10-CM

## 2021-01-25 DIAGNOSIS — M54.16 LEFT LUMBAR RADICULOPATHY: Primary | ICD-10-CM

## 2021-01-25 PROCEDURE — 3008F BODY MASS INDEX DOCD: CPT | Performed by: PHYSICAL MEDICINE & REHABILITATION

## 2021-01-25 PROCEDURE — 99214 OFFICE O/P EST MOD 30 MIN: CPT | Performed by: PHYSICAL MEDICINE & REHABILITATION

## 2021-01-25 PROCEDURE — 3078F DIAST BP <80 MM HG: CPT | Performed by: PHYSICAL MEDICINE & REHABILITATION

## 2021-01-25 PROCEDURE — 3075F SYST BP GE 130 - 139MM HG: CPT | Performed by: PHYSICAL MEDICINE & REHABILITATION

## 2021-01-25 RX ORDER — GABAPENTIN 600 MG/1
600 TABLET ORAL 3 TIMES DAILY
Qty: 90 TABLET | Refills: 0 | Status: ON HOLD | OUTPATIENT
Start: 2021-01-25 | End: 2021-02-16

## 2021-01-25 NOTE — PATIENT INSTRUCTIONS
-See surgery   -Gabapentin increase to 600mg three times/daily (300/300/600 for the first few days, then 600/300/600 for the next few days, then 600/600/600)  -Follow up as needed

## 2021-01-25 NOTE — PROGRESS NOTES
[address]  FOLLOW UP EVALUATION      Chief Complaint: back pain.     HISTORY OF PRESENT ILLNESS:   Patient presents with:  Back Pain: LOV: 1/14/21 Back sharp pain that has radiated to back of left thigh (ach the buttock and low back. His pain is rated 10 out of 10 when he stands up and tries to ambulate and when he is seated his pain is improved to 7 out of 10. He denies any right lower extremity symptoms.   He denies any loss of bowel bladder control, any fe 0.1 % External Ointment Use bid 100 g 12   • Betamethasone Dipropionate Aug 0.05 % External Cream Apply topically 2 (two) times daily.  30 g 1         ALLERGIES:     Dupixent [Dupilumab]    SWELLING      FAMILY HISTORY:     Family History   Problem Relation symmetrical.     Palpation: Non tender to palpation of the spinous process.  TTP of left lumbar paraspinal muscles  ROM: Restricted in forward flexion  Strength: 5/5 in bilateral lower extremities except mild weakness with left EHL and ankle dorsiflexion  S radiculopathy    3. Dyslipidemia    4. Sleep disturbance    5. Poor posture    6. HNP (herniated nucleus pulposus), lumbar        Red Davis is a pleasant 65-year-old gentleman who presents today for follow-up evaluation of left lumbar radiculopathy.   P

## 2021-01-29 ENCOUNTER — OFFICE VISIT (OUTPATIENT)
Dept: SURGERY | Facility: CLINIC | Age: 71
End: 2021-01-29
Payer: MEDICARE

## 2021-01-29 ENCOUNTER — TELEPHONE (OUTPATIENT)
Dept: SURGERY | Facility: CLINIC | Age: 71
End: 2021-01-29

## 2021-01-29 VITALS
WEIGHT: 204 LBS | HEART RATE: 67 BPM | BODY MASS INDEX: 28.56 KG/M2 | DIASTOLIC BLOOD PRESSURE: 66 MMHG | SYSTOLIC BLOOD PRESSURE: 106 MMHG | HEIGHT: 71 IN

## 2021-01-29 DIAGNOSIS — M51.16 LUMBAR DISC HERNIATION WITH RADICULOPATHY: Primary | ICD-10-CM

## 2021-01-29 DIAGNOSIS — R29.898 WEAKNESS OF LEFT LOWER EXTREMITY: ICD-10-CM

## 2021-01-29 PROCEDURE — 3078F DIAST BP <80 MM HG: CPT | Performed by: PHYSICIAN ASSISTANT

## 2021-01-29 PROCEDURE — 99204 OFFICE O/P NEW MOD 45 MIN: CPT | Performed by: PHYSICIAN ASSISTANT

## 2021-01-29 PROCEDURE — 3008F BODY MASS INDEX DOCD: CPT | Performed by: PHYSICIAN ASSISTANT

## 2021-01-29 PROCEDURE — 3074F SYST BP LT 130 MM HG: CPT | Performed by: PHYSICIAN ASSISTANT

## 2021-01-29 RX ORDER — CERAMIDES 1,3,6-II
CREAM (GRAM) TOPICAL DAILY
COMMUNITY

## 2021-01-29 RX ORDER — PREDNISONE 1 MG/1
TABLET ORAL
Status: ON HOLD | COMMUNITY
Start: 2021-01-26 | End: 2021-02-15

## 2021-01-29 RX ORDER — FLUOCINOLONE ACETONIDE 0.11 MG/ML
OIL TOPICAL
COMMUNITY
Start: 2020-03-10 | End: 2021-02-01

## 2021-01-29 NOTE — PATIENT INSTRUCTIONS
You are scheduled for L-4-5 discectomy on 2/15/21 with . · PCP clearance is needed. We have faxed a request for pre-op clearance to you PCP Dr. Maria Luz Guerra. Please contact their office for appointment.   · You will need to contact the Pre-admiss the medication before you are discharged from the hospital.  · Post operative appointment to be made 2 weeks after surgery.   Your post op appointments are 6.  · In order to prevent infection, you will need to purchase Hibiclens soap and use it after your r after noon on Fridays or by on call physicians. • By law, narcotics cannot be faxed or phoned into your pharmacy. The prescription must be signed by the provider, picked up in our office and physically brought to the pharmacy.   A 30 day supply with no ref your insurance carrier to obtain pre-certification or prior authorization. Unfortunately, [facility] has seen an increase in denial of payment even though the procedure/test has been pre-certified.   You are strongly encouraged to contact your insurance carrier

## 2021-01-29 NOTE — PROGRESS NOTES
Patient here for consult, referred by Dr. Gerald Guzmán for back pain, bulging disc. Back pain 12-14 weeks ago, no known cause. Low back pain is sharp, located across back. Also with sharp pain down left leg. Occasional tingling to left leg.  Also notes instabi

## 2021-01-29 NOTE — TELEPHONE ENCOUNTER
Patient is scheduled for L 4-5 discectomy on 2/15/21 with Dr Berline Meigs.     Surgery order signed X  Placed sx on surgery sheet X  Placed on outlook calendar X  [employer] message sent to patient with sx instructions X  Faxed pre-op clearance request to PCP

## 2021-01-29 NOTE — PROGRESS NOTES
[facility] Neurosurgery Consultation      HISTORY OF PRESENT ILLNESS:Ben Umaña is a 69year old right-handed male here for spinal consultation.   He has a history of developing left foot pain and developed into left leg radiculopathy at the end of November w Positive straight leg raise on the left and near full extension. Negative straight leg raise on the right.   He is able to do a single-leg heel raise on the right he has difficulty on the left    Upper extremity strength:       Deltoid    Triceps     Bicep weakness. He would like to pursue surgical intervention. Risks and benefits were discussed at length. To included expected outcome from surgery, unexpected outcomes from surgery, and associated risks with any surgical procedure.  To include infection, n

## 2021-01-29 NOTE — TELEPHONE ENCOUNTER
You are scheduled for L 4-5 discectomy on 2/15/21 with Dr. Dorene Quigley. · PCP clearance is needed. We have faxed a request for pre-op clearance to you PCP Jamir. Please contact their office for appointment.   · You will need to contact the Pre-admission the medication before you are discharged from the hospital.  · Post operative appointment to be made 2 weeks after surgery. Your post op appointments are on 3/4/21 with ANALI Alanis and 3/30/21 with Dr. Mago Fisher.    · In order to prevent infection, you

## 2021-02-01 RX ORDER — ACETAMINOPHEN 500 MG
1000 TABLET ORAL ONCE
Status: CANCELLED | OUTPATIENT
Start: 2021-02-01 | End: 2021-02-01

## 2021-02-01 RX ORDER — ACETAMINOPHEN 500 MG
500 TABLET ORAL EVERY 6 HOURS PRN
Status: ON HOLD | COMMUNITY
End: 2021-02-15

## 2021-02-01 RX ORDER — TRIAMCINOLONE ACETONIDE 0.25 MG/G
OINTMENT TOPICAL AS NEEDED
COMMUNITY

## 2021-02-02 ENCOUNTER — OFFICE VISIT (OUTPATIENT)
Dept: SURGERY | Facility: CLINIC | Age: 71
End: 2021-02-02
Payer: MEDICARE

## 2021-02-02 VITALS
WEIGHT: 206 LBS | HEIGHT: 70 IN | DIASTOLIC BLOOD PRESSURE: 70 MMHG | BODY MASS INDEX: 29.49 KG/M2 | HEART RATE: 66 BPM | SYSTOLIC BLOOD PRESSURE: 132 MMHG

## 2021-02-02 DIAGNOSIS — R29.898 WEAKNESS OF LEFT LOWER EXTREMITY: ICD-10-CM

## 2021-02-02 DIAGNOSIS — M51.16 LUMBAR DISC HERNIATION WITH RADICULOPATHY: Primary | ICD-10-CM

## 2021-02-02 DIAGNOSIS — M54.16 CHRONIC LEFT LUMBAR RADICULOPATHY: ICD-10-CM

## 2021-02-02 PROCEDURE — 3075F SYST BP GE 130 - 139MM HG: CPT | Performed by: PHYSICIAN ASSISTANT

## 2021-02-02 PROCEDURE — 3008F BODY MASS INDEX DOCD: CPT | Performed by: PHYSICIAN ASSISTANT

## 2021-02-02 PROCEDURE — 3078F DIAST BP <80 MM HG: CPT | Performed by: PHYSICIAN ASSISTANT

## 2021-02-02 PROCEDURE — 99213 OFFICE O/P EST LOW 20 MIN: CPT | Performed by: PHYSICIAN ASSISTANT

## 2021-02-02 NOTE — H&P
Neurosurgery Clinic Visit  2021    David Billy PCP:  Rupinder Bedoya MD     MRN [id number]     HISTORY OF PRESENT ILLNESS:  David Billy is a(n) 69year old male who is here for follow-up for lumbar radiculopathy.   He continues to have p Weakness in left lower extremity. Reviewed imaging with the patient. He has failed conservative treatment. Discussed surgical options. He would need a left L4-5 microdiscectomy. Risks and benefits were discussed at length.  To included expected outc

## 2021-02-02 NOTE — H&P (VIEW-ONLY)
Neurosurgery Clinic Visit  2021    Li Leon PCP:  Erna Chan MD     MRN [id number]     HISTORY OF PRESENT ILLNESS:  Li Leon is a(n) 69year old male who is here for follow-up for lumbar radiculopathy.   He continues to have p Weakness in left lower extremity. Reviewed imaging with the patient. He has failed conservative treatment. Discussed surgical options. He would need a left L4-5 microdiscectomy. Risks and benefits were discussed at length.  To included expected outc

## 2021-02-02 NOTE — PROGRESS NOTES
Neurosurgery Clinic Visit  2021    Nicolas Up PCP:  Paolo Yeung MD     MRN [id number]     HISTORY OF PRESENT ILLNESS:  Nicolas Up is a(n) 69year old male who is here for follow-up for lumbar radiculopathy.   He continues to have p Weakness in left lower extremity. Reviewed imaging with the patient. He has failed conservative treatment. Discussed surgical options. He would need a left L4-5 microdiscectomy. Risks and benefits were discussed at length.  To included expected outc

## 2021-02-02 NOTE — TELEPHONE ENCOUNTER
Spoke with Brittney Gonsalves at [address] [place] [address] [phone]  Pending Case #:[id number]   Time:14:22    Per Brittney Gonsalves Prior Authorization is Required for L4-5 Microdiscectomy Surgery ([id number])     Prior Auth Form needs to be completed and faxed back with clinical informa

## 2021-02-03 ENCOUNTER — LAB ENCOUNTER (OUTPATIENT)
Dept: LAB | Facility: HOSPITAL | Age: 71
End: 2021-02-03
Attending: INTERNAL MEDICINE
Payer: MEDICARE

## 2021-02-03 ENCOUNTER — HOSPITAL ENCOUNTER (OUTPATIENT)
Dept: GENERAL RADIOLOGY | Facility: HOSPITAL | Age: 71
Discharge: HOME | End: 2021-02-03
Attending: INTERNAL MEDICINE
Payer: MEDICARE

## 2021-02-03 ENCOUNTER — HOSPITAL ENCOUNTER (OUTPATIENT)
Dept: GENERAL RADIOLOGY | Facility: HOSPITAL | Age: 71
Discharge: HOME | End: 2021-02-03
Attending: PHYSICIAN ASSISTANT
Payer: MEDICARE

## 2021-02-03 DIAGNOSIS — Z01.818 PRE-OP EVALUATION: Primary | ICD-10-CM

## 2021-02-03 DIAGNOSIS — M51.16 LUMBAR DISC HERNIATION WITH RADICULOPATHY: ICD-10-CM

## 2021-02-03 DIAGNOSIS — Z01.818 PRE-OP TESTING: ICD-10-CM

## 2021-02-03 DIAGNOSIS — M54.16 CHRONIC LEFT LUMBAR RADICULOPATHY: ICD-10-CM

## 2021-02-03 DIAGNOSIS — R29.898 WEAKNESS OF LEFT LOWER EXTREMITY: ICD-10-CM

## 2021-02-03 LAB
ALBUMIN SERPL-MCNC: 3.5 G/DL (ref 3.4–5)
ALBUMIN/GLOB SERPL: 0.9 {RATIO} (ref 1–2)
ALP LIVER SERPL-CCNC: 99 U/L
ALT SERPL-CCNC: 44 U/L
ANION GAP SERPL CALC-SCNC: 4 MMOL/L (ref 0–18)
APTT PPP: 29.7 SECONDS (ref 23.2–35.3)
AST SERPL-CCNC: 13 U/L (ref 15–37)
BASOPHILS # BLD AUTO: 0.05 X10(3) UL (ref 0–0.2)
BASOPHILS NFR BLD AUTO: 0.7 %
BILIRUB SERPL-MCNC: 0.5 MG/DL (ref 0.1–2)
BILIRUB UR QL: NEGATIVE
BUN BLD-MCNC: 23 MG/DL (ref 7–18)
BUN/CREAT SERPL: 18.7 (ref 10–20)
CALCIUM BLD-MCNC: 10 MG/DL (ref 8.5–10.1)
CHLORIDE SERPL-SCNC: 110 MMOL/L (ref 98–112)
CLARITY UR: CLEAR
CO2 SERPL-SCNC: 30 MMOL/L (ref 21–32)
COLOR UR: YELLOW
CREAT BLD-MCNC: 1.23 MG/DL
DEPRECATED RDW RBC AUTO: 48.8 FL (ref 35.1–46.3)
EOSINOPHIL # BLD AUTO: 0.26 X10(3) UL (ref 0–0.7)
EOSINOPHIL NFR BLD AUTO: 3.5 %
ERYTHROCYTE [DISTWIDTH] IN BLOOD BY AUTOMATED COUNT: 13.6 % (ref 11–15)
GLOBULIN PLAS-MCNC: 4 G/DL (ref 2.8–4.4)
GLUCOSE BLD-MCNC: 87 MG/DL (ref 70–99)
GLUCOSE UR-MCNC: NEGATIVE MG/DL
HCT VFR BLD AUTO: 48.6 %
HGB BLD-MCNC: 15.7 G/DL
HGB UR QL STRIP.AUTO: NEGATIVE
IMM GRANULOCYTES # BLD AUTO: 0.04 X10(3) UL (ref 0–1)
IMM GRANULOCYTES NFR BLD: 0.5 %
INR BLD: 0.91 (ref 0.9–1.2)
KETONES UR-MCNC: NEGATIVE MG/DL
LEUKOCYTE ESTERASE UR QL STRIP.AUTO: NEGATIVE
LYMPHOCYTES # BLD AUTO: 2.25 X10(3) UL (ref 1–4)
LYMPHOCYTES NFR BLD AUTO: 29.9 %
M PROTEIN MFR SERPL ELPH: 7.5 G/DL (ref 6.4–8.2)
MCH RBC QN AUTO: 31.5 PG (ref 26–34)
MCHC RBC AUTO-ENTMCNC: 32.3 G/DL (ref 31–37)
MCV RBC AUTO: 97.4 FL
MONOCYTES # BLD AUTO: 0.72 X10(3) UL (ref 0.1–1)
MONOCYTES NFR BLD AUTO: 9.6 %
MRSA DNA SPEC QL NAA+PROBE: NEGATIVE
NEUTROPHILS # BLD AUTO: 4.21 X10 (3) UL (ref 1.5–7.7)
NEUTROPHILS # BLD AUTO: 4.21 X10(3) UL (ref 1.5–7.7)
NEUTROPHILS NFR BLD AUTO: 55.8 %
NITRITE UR QL STRIP.AUTO: NEGATIVE
OSMOLALITY SERPL CALC.SUM OF ELEC: 301 MOSM/KG (ref 275–295)
PATIENT FASTING Y/N/NP: YES
PH UR: 5 [PH] (ref 5–8)
PLATELET # BLD AUTO: 299 10(3)UL (ref 150–450)
POTASSIUM SERPL-SCNC: 4.2 MMOL/L (ref 3.5–5.1)
PROT UR-MCNC: NEGATIVE MG/DL
PROTHROMBIN TIME: 12.1 SECONDS (ref 11.8–14.5)
RBC # BLD AUTO: 4.99 X10(6)UL
SODIUM SERPL-SCNC: 144 MMOL/L (ref 136–145)
SP GR UR STRIP: 1.01 (ref 1–1.03)
UROBILINOGEN UR STRIP-ACNC: <2
WBC # BLD AUTO: 7.5 X10(3) UL (ref 4–11)

## 2021-02-03 PROCEDURE — 81003 URINALYSIS AUTO W/O SCOPE: CPT

## 2021-02-03 PROCEDURE — 85730 THROMBOPLASTIN TIME PARTIAL: CPT

## 2021-02-03 PROCEDURE — 87641 MR-STAPH DNA AMP PROBE: CPT

## 2021-02-03 PROCEDURE — 36415 COLL VENOUS BLD VENIPUNCTURE: CPT

## 2021-02-03 PROCEDURE — 80053 COMPREHEN METABOLIC PANEL: CPT

## 2021-02-03 PROCEDURE — 85025 COMPLETE CBC W/AUTO DIFF WBC: CPT

## 2021-02-03 PROCEDURE — 72100 X-RAY EXAM L-S SPINE 2/3 VWS: CPT | Performed by: PHYSICIAN ASSISTANT

## 2021-02-03 PROCEDURE — 85610 PROTHROMBIN TIME: CPT

## 2021-02-03 PROCEDURE — 71046 X-RAY EXAM CHEST 2 VIEWS: CPT | Performed by: INTERNAL MEDICINE

## 2021-02-04 NOTE — TELEPHONE ENCOUNTER
Received call from Jovanni at [facility] is requesting status on PA Form and Clinical Information that has not been received as of yet.      mentioned to Figueroa that PA Forms has been given to RN's so provider can review and sign off on Form, once

## 2021-02-04 NOTE — TELEPHONE ENCOUNTER
Received PA Form back from RN's     PA Form and Clinical Information faxed to [facility] at [phone];confirmation received.      Pending Case #:[id number]

## 2021-02-04 NOTE — TELEPHONE ENCOUNTER
Per PCP- on 2/2/2018: \"Plan  Med review  Labs //EKG pre op NSR  No NSAIDs prior to surgery  If labs are stable > no contraindication tosurgery   Need stress streroids IV perioperatively as patient has been on prednisone for 1 year \"    Labs com

## 2021-02-10 NOTE — TELEPHONE ENCOUNTER
Labs ordered with surgery order:    EKG, CBC, CMP, MRSA / MSSA per protocol, PT / INR  Other PAT tests: CXR    Lab results and CXR results printed and faxed to Dr. Ailyn Norton office to fax number: [phone]

## 2021-02-10 NOTE — TELEPHONE ENCOUNTER
PCP clearance pending pre-op testing. Irena PCP- not able to access pre-op testing done at [facility]. Will need to fax labs and chest xray to  to obtain final clearance.  Fax #: [phone].

## 2021-02-11 NOTE — TELEPHONE ENCOUNTER
Office visit notes from Dr. China Gaines office received. Noted that on page 2, PCP's note remains as: \"If labs are stable, no contraindication to surgery\". Labs resulted on 2/3/21 were provided to Dr. Brooks Cheung to review.   Per Dr. Brooks Cheung, Iraida Gayle

## 2021-02-11 NOTE — TELEPHONE ENCOUNTER
Spoke with Renate Espinosa at [address]  Time:06:26    Per Renate Espinosa -Surgery PA Case is still under review as of 02/11/21, clinical information has been received and is assigned to [address] [place] [address].

## 2021-02-11 NOTE — TELEPHONE ENCOUNTER
Spoke with patient's wife Monse Bay ([address] per HIPPA) regarding status on Surgery PA.      Mentioned to Monse Bay that Surgery PA is still pending clinical information and PA Form have been faxed to [facility] addison, and PA can take a couple of days for determination, but wi

## 2021-02-11 NOTE — TELEPHONE ENCOUNTER
Called Jamir's office to ask provider if pt is clear for sx. Office states that they faxed our office a clearance letter yesterday.  No documentation on this so I asked them to refax the clearance to our fax #    Waiting on fax     Note from Dr. Mendoza Listen 2

## 2021-02-12 ENCOUNTER — LAB ENCOUNTER (OUTPATIENT)
Dept: LAB | Facility: HOSPITAL | Age: 71
End: 2021-02-12
Attending: NEUROLOGICAL SURGERY
Payer: MEDICARE

## 2021-02-12 DIAGNOSIS — M51.26 LUMBAR HERNIATED DISC: ICD-10-CM

## 2021-02-12 LAB — SARS-COV-2 RNA RESP QL NAA+PROBE: NOT DETECTED

## 2021-02-12 NOTE — TELEPHONE ENCOUNTER
Prior authorization request completed for: L4-5 Microdiscectomy Surgery   Authorization #[id number]  Spoke with Hoang Rosa at [address] [place] [address] [phone]  Reference #:[id number]    Time:07:27    Confirmed Surgery Authorization / Authorization Letter will b

## 2021-02-15 ENCOUNTER — HOSPITAL ENCOUNTER (OUTPATIENT)
Facility: HOSPITAL | Age: 71
Discharge: HOME | End: 2021-02-16
Attending: NEUROLOGICAL SURGERY | Admitting: NEUROLOGICAL SURGERY
Payer: MEDICARE

## 2021-02-15 ENCOUNTER — ANESTHESIA (OUTPATIENT)
Dept: SURGERY | Facility: HOSPITAL | Age: 71
End: 2021-02-15
Payer: MEDICARE

## 2021-02-15 ENCOUNTER — APPOINTMENT (OUTPATIENT)
Dept: GENERAL RADIOLOGY | Facility: HOSPITAL | Age: 71
End: 2021-02-15
Attending: NEUROLOGICAL SURGERY
Payer: MEDICARE

## 2021-02-15 ENCOUNTER — ANESTHESIA EVENT (OUTPATIENT)
Dept: SURGERY | Facility: HOSPITAL | Age: 71
End: 2021-02-15
Payer: MEDICARE

## 2021-02-15 DIAGNOSIS — M51.26 LUMBAR HERNIATED DISC: Primary | ICD-10-CM

## 2021-02-15 DIAGNOSIS — M51.16 LUMBAR DISC HERNIATION WITH RADICULOPATHY: ICD-10-CM

## 2021-02-15 DIAGNOSIS — M67.432 GANGLION CYST OF WRIST, LEFT: ICD-10-CM

## 2021-02-15 PROCEDURE — 99204 OFFICE O/P NEW MOD 45 MIN: CPT | Performed by: INTERNAL MEDICINE

## 2021-02-15 PROCEDURE — 3008F BODY MASS INDEX DOCD: CPT | Performed by: INTERNAL MEDICINE

## 2021-02-15 PROCEDURE — 76000 FLUOROSCOPY <1 HR PHYS/QHP: CPT | Performed by: NEUROLOGICAL SURGERY

## 2021-02-15 PROCEDURE — S0028 INJECTION, FAMOTIDINE, 20 MG: HCPCS | Performed by: ANESTHESIOLOGY

## 2021-02-15 PROCEDURE — 3074F SYST BP LT 130 MM HG: CPT | Performed by: INTERNAL MEDICINE

## 2021-02-15 PROCEDURE — 3078F DIAST BP <80 MM HG: CPT | Performed by: INTERNAL MEDICINE

## 2021-02-15 PROCEDURE — 01NB0ZZ RELEASE LUMBAR NERVE, OPEN APPROACH: ICD-10-PCS | Performed by: NEUROLOGICAL SURGERY

## 2021-02-15 PROCEDURE — 0SB20ZZ EXCISION OF LUMBAR VERTEBRAL DISC, OPEN APPROACH: ICD-10-PCS | Performed by: NEUROLOGICAL SURGERY

## 2021-02-15 RX ORDER — SENNOSIDES 8.6 MG
17.2 TABLET ORAL NIGHTLY
Status: DISCONTINUED | OUTPATIENT
Start: 2021-02-15 | End: 2021-02-16

## 2021-02-15 RX ORDER — DOCUSATE SODIUM 100 MG/1
100 CAPSULE, LIQUID FILLED ORAL 2 TIMES DAILY
Qty: 60 CAPSULE | Refills: 2 | Status: SHIPPED | OUTPATIENT
Start: 2021-02-15

## 2021-02-15 RX ORDER — CEPHALEXIN 500 MG/1
500 CAPSULE ORAL 4 TIMES DAILY
Qty: 12 CAPSULE | Refills: 0 | Status: SHIPPED | OUTPATIENT
Start: 2021-02-15 | End: 2021-02-15

## 2021-02-15 RX ORDER — DEXAMETHASONE SODIUM PHOSPHATE 4 MG/ML
8 VIAL (ML) INJECTION AS NEEDED
Status: DISCONTINUED | OUTPATIENT
Start: 2021-02-15 | End: 2021-02-15 | Stop reason: HOSPADM

## 2021-02-15 RX ORDER — DEXAMETHASONE SODIUM PHOSPHATE 4 MG/ML
VIAL (ML) INJECTION AS NEEDED
Status: DISCONTINUED | OUTPATIENT
Start: 2021-02-15 | End: 2021-02-15 | Stop reason: SURG

## 2021-02-15 RX ORDER — DIPHENHYDRAMINE HYDROCHLORIDE 50 MG/ML
25 INJECTION INTRAMUSCULAR; INTRAVENOUS EVERY 4 HOURS PRN
Status: DISCONTINUED | OUTPATIENT
Start: 2021-02-15 | End: 2021-02-16

## 2021-02-15 RX ORDER — DIPHENHYDRAMINE HCL 25 MG
25 CAPSULE ORAL EVERY 4 HOURS PRN
Status: DISCONTINUED | OUTPATIENT
Start: 2021-02-15 | End: 2021-02-16

## 2021-02-15 RX ORDER — LIDOCAINE HYDROCHLORIDE 10 MG/ML
INJECTION, SOLUTION EPIDURAL; INFILTRATION; INTRACAUDAL; PERINEURAL AS NEEDED
Status: DISCONTINUED | OUTPATIENT
Start: 2021-02-15 | End: 2021-02-15 | Stop reason: SURG

## 2021-02-15 RX ORDER — ROCURONIUM BROMIDE 10 MG/ML
INJECTION, SOLUTION INTRAVENOUS AS NEEDED
Status: DISCONTINUED | OUTPATIENT
Start: 2021-02-15 | End: 2021-02-15 | Stop reason: SURG

## 2021-02-15 RX ORDER — TRIAMCINOLONE ACETONIDE 0.25 MG/G
1 CREAM TOPICAL AS NEEDED
Status: DISCONTINUED | OUTPATIENT
Start: 2021-02-15 | End: 2021-02-16

## 2021-02-15 RX ORDER — CEFAZOLIN SODIUM/WATER 2 G/20 ML
2 SYRINGE (ML) INTRAVENOUS ONCE
Status: COMPLETED | OUTPATIENT
Start: 2021-02-15 | End: 2021-02-15

## 2021-02-15 RX ORDER — LIDOCAINE HYDROCHLORIDE 40 MG/ML
SOLUTION TOPICAL AS NEEDED
Status: DISCONTINUED | OUTPATIENT
Start: 2021-02-15 | End: 2021-02-15 | Stop reason: SURG

## 2021-02-15 RX ORDER — NALOXONE HYDROCHLORIDE 0.4 MG/ML
80 INJECTION, SOLUTION INTRAMUSCULAR; INTRAVENOUS; SUBCUTANEOUS AS NEEDED
Status: DISCONTINUED | OUTPATIENT
Start: 2021-02-15 | End: 2021-02-15 | Stop reason: HOSPADM

## 2021-02-15 RX ORDER — HYDROCODONE BITARTRATE AND ACETAMINOPHEN 10; 325 MG/1; MG/1
1 TABLET ORAL
Qty: 60 TABLET | Refills: 0 | Status: SHIPPED | OUTPATIENT
Start: 2021-02-15 | End: 2021-02-16

## 2021-02-15 RX ORDER — PROCHLORPERAZINE EDISYLATE 5 MG/ML
10 INJECTION INTRAMUSCULAR; INTRAVENOUS EVERY 6 HOURS PRN
Status: DISCONTINUED | OUTPATIENT
Start: 2021-02-15 | End: 2021-02-16

## 2021-02-15 RX ORDER — HYDROCODONE BITARTRATE AND ACETAMINOPHEN 10; 325 MG/1; MG/1
2 TABLET ORAL EVERY 4 HOURS PRN
Status: DISCONTINUED | OUTPATIENT
Start: 2021-02-15 | End: 2021-02-16

## 2021-02-15 RX ORDER — HYDROCODONE BITARTRATE AND ACETAMINOPHEN 10; 325 MG/1; MG/1
1 TABLET ORAL EVERY 4 HOURS PRN
Status: DISCONTINUED | OUTPATIENT
Start: 2021-02-15 | End: 2021-02-16

## 2021-02-15 RX ORDER — HYDROMORPHONE HYDROCHLORIDE 1 MG/ML
0.2 INJECTION, SOLUTION INTRAMUSCULAR; INTRAVENOUS; SUBCUTANEOUS EVERY 2 HOUR PRN
Status: DISCONTINUED | OUTPATIENT
Start: 2021-02-15 | End: 2021-02-16

## 2021-02-15 RX ORDER — FAMOTIDINE 10 MG/ML
INJECTION, SOLUTION INTRAVENOUS AS NEEDED
Status: DISCONTINUED | OUTPATIENT
Start: 2021-02-15 | End: 2021-02-15 | Stop reason: SURG

## 2021-02-15 RX ORDER — FLUTICASONE PROPIONATE 0.05 MG/G
OINTMENT TOPICAL AS NEEDED
Status: DISCONTINUED | OUTPATIENT
Start: 2021-02-15 | End: 2021-02-16

## 2021-02-15 RX ORDER — CYCLOBENZAPRINE HCL 10 MG
10 TABLET ORAL 3 TIMES DAILY PRN
Status: DISCONTINUED | OUTPATIENT
Start: 2021-02-15 | End: 2021-02-16

## 2021-02-15 RX ORDER — ACETAMINOPHEN 325 MG/1
650 TABLET ORAL EVERY 4 HOURS PRN
Status: DISCONTINUED | OUTPATIENT
Start: 2021-02-15 | End: 2021-02-16

## 2021-02-15 RX ORDER — SODIUM CHLORIDE, SODIUM LACTATE, POTASSIUM CHLORIDE, CALCIUM CHLORIDE 600; 310; 30; 20 MG/100ML; MG/100ML; MG/100ML; MG/100ML
INJECTION, SOLUTION INTRAVENOUS CONTINUOUS
Status: DISCONTINUED | OUTPATIENT
Start: 2021-02-15 | End: 2021-02-15 | Stop reason: HOSPADM

## 2021-02-15 RX ORDER — MORPHINE SULFATE 0.5 MG/ML
INJECTION, SOLUTION EPIDURAL; INTRATHECAL; INTRAVENOUS AS NEEDED
Status: DISCONTINUED | OUTPATIENT
Start: 2021-02-15 | End: 2021-02-15 | Stop reason: HOSPADM

## 2021-02-15 RX ORDER — MINERAL OIL/PETROLATUM,WHITE
1 CREAM (GRAM) TOPICAL DAILY
Status: DISCONTINUED | OUTPATIENT
Start: 2021-02-15 | End: 2021-02-16

## 2021-02-15 RX ORDER — CLOBETASOL PROPIONATE 0.46 MG/ML
SOLUTION TOPICAL 2 TIMES DAILY
COMMUNITY

## 2021-02-15 RX ORDER — EPHEDRINE SULFATE 50 MG/ML
INJECTION INTRAVENOUS AS NEEDED
Status: DISCONTINUED | OUTPATIENT
Start: 2021-02-15 | End: 2021-02-15 | Stop reason: SURG

## 2021-02-15 RX ORDER — NEOSTIGMINE METHYLSULFATE 1 MG/ML
INJECTION INTRAVENOUS AS NEEDED
Status: DISCONTINUED | OUTPATIENT
Start: 2021-02-15 | End: 2021-02-15 | Stop reason: SURG

## 2021-02-15 RX ORDER — CYCLOBENZAPRINE HCL 10 MG
10 TABLET ORAL 3 TIMES DAILY PRN
Qty: 60 TABLET | Refills: 2 | Status: SHIPPED | OUTPATIENT
Start: 2021-02-15 | End: 2021-02-16

## 2021-02-15 RX ORDER — SODIUM PHOSPHATE, DIBASIC AND SODIUM PHOSPHATE, MONOBASIC 7; 19 G/133ML; G/133ML
1 ENEMA RECTAL ONCE AS NEEDED
Status: DISCONTINUED | OUTPATIENT
Start: 2021-02-15 | End: 2021-02-16

## 2021-02-15 RX ORDER — CEFAZOLIN SODIUM/WATER 2 G/20 ML
2 SYRINGE (ML) INTRAVENOUS EVERY 8 HOURS
Status: COMPLETED | OUTPATIENT
Start: 2021-02-15 | End: 2021-02-15

## 2021-02-15 RX ORDER — METOCLOPRAMIDE HYDROCHLORIDE 5 MG/ML
10 INJECTION INTRAMUSCULAR; INTRAVENOUS AS NEEDED
Status: DISCONTINUED | OUTPATIENT
Start: 2021-02-15 | End: 2021-02-15 | Stop reason: HOSPADM

## 2021-02-15 RX ORDER — POLYETHYLENE GLYCOL 3350 17 G/17G
17 POWDER, FOR SOLUTION ORAL DAILY PRN
Status: DISCONTINUED | OUTPATIENT
Start: 2021-02-15 | End: 2021-02-16

## 2021-02-15 RX ORDER — GABAPENTIN 600 MG/1
600 TABLET ORAL 3 TIMES DAILY
Status: DISCONTINUED | OUTPATIENT
Start: 2021-02-15 | End: 2021-02-16

## 2021-02-15 RX ORDER — HYDROMORPHONE HYDROCHLORIDE 1 MG/ML
0.8 INJECTION, SOLUTION INTRAMUSCULAR; INTRAVENOUS; SUBCUTANEOUS EVERY 2 HOUR PRN
Status: DISCONTINUED | OUTPATIENT
Start: 2021-02-15 | End: 2021-02-16

## 2021-02-15 RX ORDER — MEPERIDINE HYDROCHLORIDE 25 MG/ML
12.5 INJECTION INTRAMUSCULAR; INTRAVENOUS; SUBCUTANEOUS AS NEEDED
Status: DISCONTINUED | OUTPATIENT
Start: 2021-02-15 | End: 2021-02-15 | Stop reason: HOSPADM

## 2021-02-15 RX ORDER — DOCUSATE SODIUM 100 MG/1
100 CAPSULE, LIQUID FILLED ORAL 2 TIMES DAILY
Status: DISCONTINUED | OUTPATIENT
Start: 2021-02-15 | End: 2021-02-16

## 2021-02-15 RX ORDER — ONDANSETRON 2 MG/ML
INJECTION INTRAMUSCULAR; INTRAVENOUS AS NEEDED
Status: DISCONTINUED | OUTPATIENT
Start: 2021-02-15 | End: 2021-02-15 | Stop reason: SURG

## 2021-02-15 RX ORDER — GLYCOPYRROLATE 0.2 MG/ML
INJECTION, SOLUTION INTRAMUSCULAR; INTRAVENOUS AS NEEDED
Status: DISCONTINUED | OUTPATIENT
Start: 2021-02-15 | End: 2021-02-15 | Stop reason: SURG

## 2021-02-15 RX ORDER — HYDROCODONE BITARTRATE AND ACETAMINOPHEN 5; 325 MG/1; MG/1
1 TABLET ORAL AS NEEDED
Status: DISCONTINUED | OUTPATIENT
Start: 2021-02-15 | End: 2021-02-15 | Stop reason: HOSPADM

## 2021-02-15 RX ORDER — METHYLPREDNISOLONE ACETATE 80 MG/ML
INJECTION, SUSPENSION INTRA-ARTICULAR; INTRALESIONAL; INTRAMUSCULAR; SOFT TISSUE AS NEEDED
Status: DISCONTINUED | OUTPATIENT
Start: 2021-02-15 | End: 2021-02-15 | Stop reason: HOSPADM

## 2021-02-15 RX ORDER — BISACODYL 10 MG
10 SUPPOSITORY, RECTAL RECTAL
Status: DISCONTINUED | OUTPATIENT
Start: 2021-02-15 | End: 2021-02-16

## 2021-02-15 RX ORDER — ONDANSETRON 2 MG/ML
4 INJECTION INTRAMUSCULAR; INTRAVENOUS AS NEEDED
Status: DISCONTINUED | OUTPATIENT
Start: 2021-02-15 | End: 2021-02-15 | Stop reason: HOSPADM

## 2021-02-15 RX ORDER — HYDROMORPHONE HYDROCHLORIDE 1 MG/ML
0.4 INJECTION, SOLUTION INTRAMUSCULAR; INTRAVENOUS; SUBCUTANEOUS EVERY 5 MIN PRN
Status: DISCONTINUED | OUTPATIENT
Start: 2021-02-15 | End: 2021-02-15 | Stop reason: HOSPADM

## 2021-02-15 RX ORDER — BACITRACIN 50000 [USP'U]/1
INJECTION, POWDER, LYOPHILIZED, FOR SOLUTION INTRAMUSCULAR AS NEEDED
Status: DISCONTINUED | OUTPATIENT
Start: 2021-02-15 | End: 2021-02-15 | Stop reason: HOSPADM

## 2021-02-15 RX ORDER — ONDANSETRON 2 MG/ML
4 INJECTION INTRAMUSCULAR; INTRAVENOUS EVERY 4 HOURS PRN
Status: ACTIVE | OUTPATIENT
Start: 2021-02-15 | End: 2021-02-16

## 2021-02-15 RX ORDER — BUPIVACAINE HYDROCHLORIDE AND EPINEPHRINE 2.5; 5 MG/ML; UG/ML
INJECTION, SOLUTION EPIDURAL; INFILTRATION; INTRACAUDAL; PERINEURAL AS NEEDED
Status: DISCONTINUED | OUTPATIENT
Start: 2021-02-15 | End: 2021-02-15 | Stop reason: HOSPADM

## 2021-02-15 RX ORDER — HYDROMORPHONE HYDROCHLORIDE 1 MG/ML
0.4 INJECTION, SOLUTION INTRAMUSCULAR; INTRAVENOUS; SUBCUTANEOUS EVERY 2 HOUR PRN
Status: DISCONTINUED | OUTPATIENT
Start: 2021-02-15 | End: 2021-02-16

## 2021-02-15 RX ORDER — HYDROCODONE BITARTRATE AND ACETAMINOPHEN 5; 325 MG/1; MG/1
2 TABLET ORAL AS NEEDED
Status: DISCONTINUED | OUTPATIENT
Start: 2021-02-15 | End: 2021-02-15 | Stop reason: HOSPADM

## 2021-02-15 RX ORDER — CLOBETASOL PROPIONATE 0.46 MG/ML
SOLUTION TOPICAL 2 TIMES DAILY
Status: DISCONTINUED | OUTPATIENT
Start: 2021-02-15 | End: 2021-02-16

## 2021-02-15 RX ORDER — MIDAZOLAM HYDROCHLORIDE 1 MG/ML
1 INJECTION INTRAMUSCULAR; INTRAVENOUS EVERY 5 MIN PRN
Status: DISCONTINUED | OUTPATIENT
Start: 2021-02-15 | End: 2021-02-15 | Stop reason: HOSPADM

## 2021-02-15 RX ORDER — SODIUM CHLORIDE AND POTASSIUM CHLORIDE .9; .15 G/100ML; G/100ML
75 SOLUTION INTRAVENOUS CONTINUOUS
Status: DISCONTINUED | OUTPATIENT
Start: 2021-02-15 | End: 2021-02-16

## 2021-02-15 RX ADMIN — SODIUM CHLORIDE, SODIUM LACTATE, POTASSIUM CHLORIDE, CALCIUM CHLORIDE: 600; 310; 30; 20 INJECTION, SOLUTION INTRAVENOUS at 09:21:00

## 2021-02-15 RX ADMIN — LIDOCAINE HYDROCHLORIDE 4 ML: 40 SOLUTION TOPICAL at 07:37:00

## 2021-02-15 RX ADMIN — FAMOTIDINE 20 MG: 10 INJECTION, SOLUTION INTRAVENOUS at 09:13:00

## 2021-02-15 RX ADMIN — DEXAMETHASONE SODIUM PHOSPHATE 4 MG: 4 MG/ML VIAL (ML) INJECTION at 09:13:00

## 2021-02-15 RX ADMIN — EPHEDRINE SULFATE 5 MG: 50 INJECTION INTRAVENOUS at 08:23:00

## 2021-02-15 RX ADMIN — LIDOCAINE HYDROCHLORIDE 50 MG: 10 INJECTION, SOLUTION EPIDURAL; INFILTRATION; INTRACAUDAL; PERINEURAL at 07:34:00

## 2021-02-15 RX ADMIN — CEFAZOLIN SODIUM/WATER 2 G: 2 G/20 ML SYRINGE (ML) INTRAVENOUS at 07:39:00

## 2021-02-15 RX ADMIN — ROCURONIUM BROMIDE 50 MG: 10 INJECTION, SOLUTION INTRAVENOUS at 07:35:00

## 2021-02-15 RX ADMIN — EPHEDRINE SULFATE 10 MG: 50 INJECTION INTRAVENOUS at 08:25:00

## 2021-02-15 RX ADMIN — NEOSTIGMINE METHYLSULFATE 4 MG: 1 INJECTION INTRAVENOUS at 08:51:00

## 2021-02-15 RX ADMIN — ONDANSETRON 4 MG: 2 INJECTION INTRAMUSCULAR; INTRAVENOUS at 08:14:00

## 2021-02-15 RX ADMIN — GLYCOPYRROLATE 0.8 MG: 0.2 INJECTION, SOLUTION INTRAMUSCULAR; INTRAVENOUS at 08:49:00

## 2021-02-15 RX ADMIN — EPHEDRINE SULFATE 10 MG: 50 INJECTION INTRAVENOUS at 08:22:00

## 2021-02-15 RX ADMIN — DEXAMETHASONE SODIUM PHOSPHATE 8 MG: 4 MG/ML VIAL (ML) INJECTION at 08:14:00

## 2021-02-15 NOTE — ANESTHESIA PROCEDURE NOTES
Airway  Urgency: elective    Airway not difficult    General Information and Staff    Patient location during procedure: OR  Anesthesiologist: Debbie Yang MD  Performed: anesthesiologist     Indications and Patient Condition  Indications for airway manage

## 2021-02-15 NOTE — ANESTHESIA PREPROCEDURE EVALUATION
PRE-OP EVALUATION    Patient Name: Nabor Wilburn    Pre-op Diagnosis: Lumbar disc herniation with radiculopathy [M51.16]    Procedure(s):  LEFT LUMBAR 4 -LUMBAR 5 MICRODISCECTOMY    Surgeon(s) and Role:     * Barb Allen MD - Primary    Pre-op Rfl: 12, More than a month at Unknown time        Allergies: Dupixent [Dupilumab], Mildew, and Mold      Anesthesia Evaluation    Patient summary reviewed.     Anesthetic Complications  (-) history of anesthetic complications         GI/Hepatic/Renal    Neg Dental  Comment: Dentition is grossly intact; Patient does not demonstrate loose teeth to inspection. No notable dental history. Pulmonary  Comment: Unlabored ventilatory effort, no retractions.   Pulmonary exam normal.                 Other fin

## 2021-02-15 NOTE — ANESTHESIA POSTPROCEDURE EVALUATION
[address] Patient Status:  Outpatient in a Bed   Age/Gender 69year old male MRN [id number]   Location [address] Attending Lauro Velasquez MD   Hosp Day # 0 PCP MD Neptali Swanson Exam: Unchanged from Preop    Patient to be discharged from PACU when criteria met.

## 2021-02-15 NOTE — OPERATIVE REPORT
Operative Note   Patient Name: Alphonso Strange  [birth year]  2/15/2021  Preoperative Diagnosis: lumbar radiculopathy, lumbar disc herniation    Postoperative Diagnosis: same   Primary Surgeon: Edd Gross M.D.    Assistant: Shameka Leyva pa-c, who Dictated but not proofread

## 2021-02-15 NOTE — PLAN OF CARE
Patient admitted via bed from PACU. Oriented to room. Safety precautions initiated. Call light in reach. Wife at bedside. Moderate pain, denies numbness or tingling to BLE. SCDs and marcos hose on bilaterally. Due to void. Fall precautions in place.     EMG

## 2021-02-15 NOTE — BRIEF OP NOTE
Pre-Operative Diagnosis: Lumbar disc herniation with radiculopathy [M51.16]     Post-Operative Diagnosis: Lumbar disc herniation with radiculopathy [M51.16]      Procedure Performed:   Procedure(s):  LEFT LUMBAR 4 -LUMBAR 5 MICRODISCECTOMY    Surgeon(s) an

## 2021-02-15 NOTE — INTERVAL H&P NOTE
Pre-op Diagnosis: Lumbar disc herniation with radiculopathy [M51.16]    The above referenced H&P was reviewed by Junior Khoi PA-C on 2/15/2021, the patient was examined and no significant changes have occurred in the patient's condition since the H&P w

## 2021-02-15 NOTE — CONSULTS
[facility]  CONSULT     Rubens Haddad Patient Status:  Outpatient in a Bed    12/15/1950 MRN [id number]   [facility] 3SW-A Attending Sona Funk MD   Hosp Day # 0 PCP Marisa Jones MD     Reason for consult: post op daily., Disp: , Rfl:     •  gabapentin 600 MG Oral Tab, Take 1 tablet (600 mg total) by mouth 3 (three) times daily. , Disp: 90 tablet, Rfl: 0    •  Na Sulfate-K Sulfate-Mg Sulf (SUPREP BOWEL PREP KIT) 17.5-3.13-1.6 GM/177ML Oral Solution, Take as directed control  2. PT OT   3. Per spine sx  2. Eczema     Quality:  · DVT Prophylaxis: SCD  · CODE status: full  · Carranza: none    Plan of care discussed with patient. No active medical issues which require inpatient management. Will sign off at this time.  Kevin

## 2021-02-16 ENCOUNTER — TELEPHONE (OUTPATIENT)
Dept: SURGERY | Facility: CLINIC | Age: 71
End: 2021-02-16

## 2021-02-16 VITALS
DIASTOLIC BLOOD PRESSURE: 59 MMHG | SYSTOLIC BLOOD PRESSURE: 117 MMHG | BODY MASS INDEX: 29.61 KG/M2 | WEIGHT: 206.81 LBS | HEART RATE: 80 BPM | OXYGEN SATURATION: 92 % | RESPIRATION RATE: 17 BRPM | HEIGHT: 70 IN | TEMPERATURE: 99 F

## 2021-02-16 DIAGNOSIS — R29.898 WEAKNESS OF LEFT LOWER EXTREMITY: ICD-10-CM

## 2021-02-16 DIAGNOSIS — M51.16 LUMBAR DISC HERNIATION WITH RADICULOPATHY: Primary | ICD-10-CM

## 2021-02-16 DIAGNOSIS — M54.16 CHRONIC LEFT LUMBAR RADICULOPATHY: ICD-10-CM

## 2021-02-16 RX ORDER — METHOCARBAMOL 500 MG/1
500 TABLET, FILM COATED ORAL 3 TIMES DAILY PRN
Qty: 30 TABLET | Refills: 0 | Status: SHIPPED | OUTPATIENT
Start: 2021-02-16 | End: 2021-03-25

## 2021-02-16 NOTE — PROGRESS NOTES
Wife at bedside. Reviewed indications, side effects of pain medication/narcotics and constipation prevention.  Stressed importance of increased fluids/roughage in diet, continued use stool softeners along with laxatives and suppositories as needed while cira

## 2021-02-16 NOTE — PHYSICAL THERAPY NOTE
PHYSICAL THERAPY QUICK EVALUATION - INPATIENT    Room Number: 356/356-A  Evaluation Date: 2/16/2021  Presenting Problem: L4-L5 microdiscectomy  Physician Order: PT Eval and Treat     2/15/21    Pre-Operative Diagnosis: Lumbar disc herniation with radicul does the patient currently have. ..  -   Turning over in bed (including adjusting bedclothes, sheets and blankets)?: None   -   Sitting down on and standing up from a chair with arms (e.g., wheelchair, bedside commode, etc.): None   -   Moving from lying on and to encourage pt to walk in the hallways with RW several times a day. Discussed recommendation for OPPT while spine is stable for neuro. Nursing aware of this visit and to recommend RW for home use.  Addressed all issues and concern and left pt on the r

## 2021-02-16 NOTE — PROGRESS NOTES
[facility]  Neurosurgery Progress Note    Alexander Cortes Patient Status:  Outpatient in a Bed    12/15/1950 MRN [id number]   [facility] 3SW-A Attending Candace Borja MD   Hosp Day # 0 PCP Esme Borges MD     Chief Complaint

## 2021-02-16 NOTE — PLAN OF CARE
Alert and oriented x 4. Had some confusion overnight. Patient also able to recall that he was hallucinating due to the pain medication. Delirium screening negative.  Neurosurgery aware of the issues with the medications and adjusted medications for discharg

## 2021-02-16 NOTE — PLAN OF CARE
POD 1 lumbar microdiscectomy. AOX4, VSS, RA. . IS encouraged. Neuro/cms intact. Surgical site with dermabond, c/d/I. Walking with min assist. Able to void post straight cath x 1. Educated to call for assistance before getting out of bed.  Fall and safety pre

## 2021-02-16 NOTE — PROGRESS NOTES
Discharge instructions reviewed with patient and spouse. They both watched discharge educational video. All questions answered. IV removed. Scripts sent to patient's own pharmacy. Cleared for discharge.

## 2021-02-16 NOTE — TELEPHONE ENCOUNTER
Received fax from [address] [place] [address] requesting Prior Authorization for Cyclobenzaprine 10 mg. Referral placed and routed to PA team for processing.     Pt is post op    Key:[id number]

## 2021-02-16 NOTE — OCCUPATIONAL THERAPY NOTE
OCCUPATIONAL THERAPY QUICK EVALUATION - INPATIENT    Room Number: 356/356-A  Evaluation Date: 2/16/2021     Type of Evaluation: Quick Eval  Presenting Problem: s/p L4-L5 microdiscectomy 2/15/21    Physician Order: IP Consult to Occupational Therapy  Reason limits    NEUROLOGICAL FINDINGS  Neurological Findings: None                ACTIVITY TOLERANCE                         O2 SATURATIONS                ACTIVITIES OF DAILY LIVING ASSESSMENT  AM-PAC ‘6-Clicks’ Inpatient Daily Activity Short Form   How much hel educated on role of OT, safety, pain management, fall prevention, and shower transfers; patient demonstrated good verbal understanding. Patient End of Session: Up in chair;Needs met;Call light within reach;RN aware of session/findings; All patient que home  Patient able to demonstrate safety with ADLS: At supervision level or above; patient reports will have supervision at home       ---------------------------------------------------------------------------------------------------------  PPE worn by th

## 2021-02-18 ENCOUNTER — TELEPHONE (OUTPATIENT)
Dept: SURGERY | Facility: CLINIC | Age: 71
End: 2021-02-18

## 2021-02-18 NOTE — TELEPHONE ENCOUNTER
Patient had   LEFT LUMBAR 4 -LUMBAR 5 MICRODISCECTOMY Left      on 2/15/21 by Dr. Taylor Huizar    For Lumbar Sx:    -Post op day 3    -How is patient feeling in general? Patient states he is \"feeling pretty good\".     -Dressing- can be removed on day 3-    -R

## 2021-03-04 ENCOUNTER — OFFICE VISIT (OUTPATIENT)
Dept: SURGERY | Facility: CLINIC | Age: 71
End: 2021-03-04
Payer: MEDICARE

## 2021-03-04 VITALS
SYSTOLIC BLOOD PRESSURE: 120 MMHG | BODY MASS INDEX: 29.2 KG/M2 | HEIGHT: 70 IN | DIASTOLIC BLOOD PRESSURE: 58 MMHG | WEIGHT: 204 LBS | HEART RATE: 64 BPM

## 2021-03-04 DIAGNOSIS — M54.16 CHRONIC LEFT LUMBAR RADICULOPATHY: ICD-10-CM

## 2021-03-04 DIAGNOSIS — M51.16 LUMBAR DISC HERNIATION WITH RADICULOPATHY: Primary | ICD-10-CM

## 2021-03-04 PROCEDURE — 3074F SYST BP LT 130 MM HG: CPT | Performed by: PHYSICIAN ASSISTANT

## 2021-03-04 PROCEDURE — 3078F DIAST BP <80 MM HG: CPT | Performed by: PHYSICIAN ASSISTANT

## 2021-03-04 PROCEDURE — 99024 POSTOP FOLLOW-UP VISIT: CPT | Performed by: PHYSICIAN ASSISTANT

## 2021-03-04 PROCEDURE — 3008F BODY MASS INDEX DOCD: CPT | Performed by: PHYSICIAN ASSISTANT

## 2021-03-04 RX ORDER — PREDNISONE 20 MG/1
TABLET ORAL
COMMUNITY
Start: 2021-02-25 | End: 2021-03-25

## 2021-03-04 RX ORDER — BUPROPION HYDROCHLORIDE 150 MG/1
150 TABLET, EXTENDED RELEASE ORAL DAILY
COMMUNITY
Start: 2021-02-22

## 2021-03-04 NOTE — PROGRESS NOTES
Neurosurgery Clinic Visit  3/4/2021    Malinda Adhikari PCP:  Sigrid Espino MD     MRN [id number]     HISTORY OF PRESENT ILLNESS:  Malinda Adhikari is a(n) 69year old male who is here 2 weeks s/p microdiscectomy.   He is feeling significant

## 2021-03-04 NOTE — PROGRESS NOTES
Patient here for 2-week postop follow-up sx 02/15/21. Has small twinge in the back every once in a while with certain movements, but overall has been feeling well. No leg pain.

## 2021-03-09 ENCOUNTER — HOSPITAL ENCOUNTER (OUTPATIENT)
Dept: MRI IMAGING | Age: 71
Discharge: HOME | End: 2021-03-09
Attending: INTERNAL MEDICINE
Payer: MEDICARE

## 2021-03-09 DIAGNOSIS — R48.2 APRAXIA: ICD-10-CM

## 2021-03-09 PROCEDURE — A9575 INJ GADOTERATE MEGLUMI 0.1ML: HCPCS | Performed by: INTERNAL MEDICINE

## 2021-03-09 PROCEDURE — 70553 MRI BRAIN STEM W/O & W/DYE: CPT | Performed by: INTERNAL MEDICINE

## 2021-03-15 ENCOUNTER — TELEPHONE (OUTPATIENT)
Dept: PAIN CLINIC | Facility: CLINIC | Age: 71
End: 2021-03-15

## 2021-03-15 NOTE — TELEPHONE ENCOUNTER
S:  Pt is calling regarding recent increase in pain    B: Surgery 2/15/21 LEFT LUMBAR 4 -LUMBAR 5 MICRODISECTOMY      Pt was seen 3/4/21 for post-op appointment. Per LOV notes from Jenelle:  ASSESSMENT and PLAN:  1. Lumbar radiculopathy.   2.  Lumbar

## 2021-03-15 NOTE — TELEPHONE ENCOUNTER
Pt calling stating hip and foot pain has returned. Pt states that he has been up and exercising as recommended by the , but now is experiencing pain again.  Pt would like to speak to nurse to see if he is doing anything wrong or if there are options to h

## 2021-03-15 NOTE — TELEPHONE ENCOUNTER
Called pt    C/o new pain on outside of left ankle  C/o mild pain just below inc in back    Pre op pain has resolved- denies pain down leg and medial ankle    He has been walking more    He will reduce the length of his walking  Will try ice  Can't take an

## 2021-03-24 ENCOUNTER — OFFICE VISIT (OUTPATIENT)
Dept: OPHTHALMOLOGY | Facility: CLINIC | Age: 71
End: 2021-03-24
Payer: MEDICARE

## 2021-03-24 DIAGNOSIS — H43.391 VITREOUS FLOATERS OF RIGHT EYE: ICD-10-CM

## 2021-03-24 DIAGNOSIS — H25.13 AGE-RELATED NUCLEAR CATARACT OF BOTH EYES: Primary | ICD-10-CM

## 2021-03-24 PROCEDURE — 92015 DETERMINE REFRACTIVE STATE: CPT | Performed by: OPHTHALMOLOGY

## 2021-03-24 PROCEDURE — 92004 COMPRE OPH EXAM NEW PT 1/>: CPT | Performed by: OPHTHALMOLOGY

## 2021-03-24 NOTE — PROGRESS NOTES
Rosetta Hung is a 69year old male.     HPI:     HPI     NP here for a complete exam. Pt complains of a gradual decrease in NVA since his last eye exam 2 years ago at [facility]. Pt states he had lumbar surgery 5 weeks ago and feels that he had a bad reaction as needed.  ) 60 capsule 2   • FLUTICASONE PROPIONATE EX Apply topically as needed. • Triamcinolone Acetonide 0.025 % External Ointment Apply topically as needed. • Roflumilast 500 MCG Oral Tab Take 500 mcg by mouth daily.      • Emollient (Sang Tyson) Temporal crescent Good rim, Temporal crescent    C/D Ratio 0.5 0.5    Macula Normal Normal    Vessels Normal Normal    Periphery Normal Normal            Refraction     Wearing Rx       Sphere Cylinder Axis Add    Right -6.50 +2.50 140 +2.50    Left -6.75

## 2021-03-24 NOTE — PATIENT INSTRUCTIONS
Vitreous floaters of right eye  No treatment. Age-related nuclear cataract of both eyes  Discussed with patient that cataracts in both eyes are advanced enough at this time to consider surgery; it would be patient's choice.   Discussed options such as s

## 2021-03-25 ENCOUNTER — OFFICE VISIT (OUTPATIENT)
Dept: SURGERY | Facility: CLINIC | Age: 71
End: 2021-03-25
Payer: MEDICARE

## 2021-03-25 VITALS
BODY MASS INDEX: 27.2 KG/M2 | WEIGHT: 190 LBS | HEART RATE: 62 BPM | HEIGHT: 70 IN | DIASTOLIC BLOOD PRESSURE: 68 MMHG | SYSTOLIC BLOOD PRESSURE: 110 MMHG

## 2021-03-25 DIAGNOSIS — M54.16 CHRONIC LEFT LUMBAR RADICULOPATHY: ICD-10-CM

## 2021-03-25 DIAGNOSIS — M51.16 LUMBAR DISC HERNIATION WITH RADICULOPATHY: Primary | ICD-10-CM

## 2021-03-25 DIAGNOSIS — R29.898 WEAKNESS OF LEFT LOWER EXTREMITY: ICD-10-CM

## 2021-03-25 PROCEDURE — 3008F BODY MASS INDEX DOCD: CPT | Performed by: NEUROLOGICAL SURGERY

## 2021-03-25 PROCEDURE — 99024 POSTOP FOLLOW-UP VISIT: CPT | Performed by: NEUROLOGICAL SURGERY

## 2021-03-25 PROCEDURE — 3074F SYST BP LT 130 MM HG: CPT | Performed by: NEUROLOGICAL SURGERY

## 2021-03-25 PROCEDURE — 3078F DIAST BP <80 MM HG: CPT | Performed by: NEUROLOGICAL SURGERY

## 2021-03-25 RX ORDER — TRAMADOL HYDROCHLORIDE 50 MG/1
50 TABLET ORAL EVERY 6 HOURS PRN
Qty: 30 TABLET | Refills: 0 | Status: SHIPPED | OUTPATIENT
Start: 2021-03-25 | End: 2021-12-13

## 2021-03-25 RX ORDER — MELATONIN
6 DAILY
COMMUNITY
Start: 2021-03-10

## 2021-03-25 NOTE — PROGRESS NOTES
Neurosurgery Clinic Visit  3/25/2021    Perico Holden PCP:  Argenis Hartley MD     MRN [id number]     HISTORY OF PRESENT ILLNESS:  Perico Holden is a(n) 69year old male who is here 6 weeks s/p microdiscectomy. He is feeling very well. [place]  [address] [facility], [address], [address] [facility]  [phone]  3/25/2021  11:19 AM

## 2021-03-25 NOTE — PROGRESS NOTES
Feeling generally good  Has some pain in left calf, left outside ankle, when sitting it's ok, when walking, pain  Not severe, but it's there  Pain 4/10 in calf  Has pain in back of hip on left side 2/10 when moving around a lot

## 2021-06-24 ENCOUNTER — OFFICE VISIT (OUTPATIENT)
Dept: SURGERY | Facility: CLINIC | Age: 71
End: 2021-06-24
Payer: MEDICARE

## 2021-06-24 VITALS
BODY MASS INDEX: 25.05 KG/M2 | HEIGHT: 70 IN | SYSTOLIC BLOOD PRESSURE: 124 MMHG | DIASTOLIC BLOOD PRESSURE: 56 MMHG | HEART RATE: 68 BPM | WEIGHT: 175 LBS

## 2021-06-24 DIAGNOSIS — M51.16 LUMBAR DISC HERNIATION WITH RADICULOPATHY: Primary | ICD-10-CM

## 2021-06-24 PROCEDURE — 3078F DIAST BP <80 MM HG: CPT | Performed by: NEUROLOGICAL SURGERY

## 2021-06-24 PROCEDURE — 3074F SYST BP LT 130 MM HG: CPT | Performed by: NEUROLOGICAL SURGERY

## 2021-06-24 PROCEDURE — 3008F BODY MASS INDEX DOCD: CPT | Performed by: NEUROLOGICAL SURGERY

## 2021-06-24 PROCEDURE — 99212 OFFICE O/P EST SF 10 MIN: CPT | Performed by: NEUROLOGICAL SURGERY

## 2021-06-24 NOTE — PROGRESS NOTES
Neurosurgery Clinic Visit  2021    Eliz Linares PCP:  Shaina Reynoso MD     MRN [id number]     HISTORY OF PRESENT ILLNESS:  Eliz Linares is a(n) 69year old male here for follow-up status post microdiscectomy in February  He has

## 2021-08-27 ENCOUNTER — TELEPHONE (OUTPATIENT)
Dept: OPHTHALMOLOGY | Facility: CLINIC | Age: 71
End: 2021-08-27

## 2021-08-27 NOTE — TELEPHONE ENCOUNTER
Spoke with wife. Information sent to [address] [place] [address] ophthalmology. Told wife their office will call them.

## 2021-08-27 NOTE — TELEPHONE ENCOUNTER
Pt's wife called. At last appointment discussed cataract surgery. Advised to call office first before calling [address] ophthalmology.     Call

## 2021-10-16 ENCOUNTER — LAB REQUISITION (OUTPATIENT)
Dept: SURGERY | Age: 71
End: 2021-10-16
Payer: MEDICARE

## 2021-10-16 DIAGNOSIS — Z01.818 PREOP EXAMINATION: ICD-10-CM

## 2021-11-13 ENCOUNTER — LAB REQUISITION (OUTPATIENT)
Dept: SURGERY | Age: 71
End: 2021-11-13
Payer: MEDICARE

## 2021-11-13 DIAGNOSIS — Z01.818 PREOP EXAMINATION: ICD-10-CM

## 2021-11-30 ENCOUNTER — HOSPITAL ENCOUNTER (OUTPATIENT)
Dept: GENERAL RADIOLOGY | Facility: HOSPITAL | Age: 71
Discharge: HOME | End: 2021-11-30
Attending: PHYSICIAN ASSISTANT
Payer: MEDICARE

## 2021-11-30 ENCOUNTER — OFFICE VISIT (OUTPATIENT)
Dept: SURGERY | Facility: CLINIC | Age: 71
End: 2021-11-30
Payer: MEDICARE

## 2021-11-30 VITALS — SYSTOLIC BLOOD PRESSURE: 120 MMHG | DIASTOLIC BLOOD PRESSURE: 60 MMHG

## 2021-11-30 DIAGNOSIS — M51.16 LUMBAR DISC HERNIATION WITH RADICULOPATHY: Primary | ICD-10-CM

## 2021-11-30 DIAGNOSIS — M25.561 ACUTE PAIN OF RIGHT KNEE: ICD-10-CM

## 2021-11-30 DIAGNOSIS — M17.11 ARTHRITIS OF KNEE, RIGHT: ICD-10-CM

## 2021-11-30 DIAGNOSIS — R29.898 RIGHT LEG WEAKNESS: ICD-10-CM

## 2021-11-30 DIAGNOSIS — M79.661 PAIN IN RIGHT SHIN: ICD-10-CM

## 2021-11-30 PROCEDURE — 99214 OFFICE O/P EST MOD 30 MIN: CPT | Performed by: PHYSICIAN ASSISTANT

## 2021-11-30 PROCEDURE — 73590 X-RAY EXAM OF LOWER LEG: CPT | Performed by: PHYSICIAN ASSISTANT

## 2021-11-30 PROCEDURE — 73562 X-RAY EXAM OF KNEE 3: CPT | Performed by: PHYSICIAN ASSISTANT

## 2021-11-30 PROCEDURE — 3078F DIAST BP <80 MM HG: CPT | Performed by: PHYSICIAN ASSISTANT

## 2021-11-30 PROCEDURE — 3074F SYST BP LT 130 MM HG: CPT | Performed by: PHYSICIAN ASSISTANT

## 2021-11-30 RX ORDER — USTEKINUMAB 45 MG/.5ML
INJECTION, SOLUTION SUBCUTANEOUS
COMMUNITY
Start: 2021-07-29

## 2021-11-30 RX ORDER — OFLOXACIN 3 MG/ML
SOLUTION/ DROPS OPHTHALMIC
COMMUNITY
Start: 2021-11-09

## 2021-11-30 RX ORDER — TACROLIMUS 1 MG/G
OINTMENT TOPICAL
COMMUNITY
Start: 2021-09-16

## 2021-11-30 RX ORDER — PREDNISOLONE ACETATE 10 MG/ML
SUSPENSION/ DROPS OPHTHALMIC
COMMUNITY
Start: 2021-11-09

## 2021-11-30 RX ORDER — PREDNISONE 1 MG/1
TABLET ORAL
COMMUNITY
Start: 2021-09-16 | End: 2021-12-20

## 2021-11-30 RX ORDER — TAMSULOSIN HYDROCHLORIDE 0.4 MG/1
CAPSULE ORAL
COMMUNITY
Start: 2021-10-22

## 2021-11-30 NOTE — PROGRESS NOTES
[facility]   Follow up      [address] Rommel Salomon is a 69year old right-handed male returns for reevaluation since his last visit he was doing well about 2 weeks ago he was walking around the edge of his bed and hit the foot numbness tingling or weakness. He has left leg pain which is in his foot ankle calf and hamstring ranges from a 4 to a 5 to a 7 to an 8/10. Is worse with walking. He feels like he has weakness in the left leg.   He gets numbness in the same distributio ankylosing in the thoracic spine. MRI of the lumbar spine from 12/22/2020 shows L2-3 L3-4 foraminal stenosis with diffuse disc bulging.   He has a large extruded disc herniation at L4-5 causing severe central stenosis with high percentage of the canal.

## 2021-11-30 NOTE — PATIENT INSTRUCTIONS
Refill policies:    • Allow 2-3 business days for refills; controlled substances may take longer.   • Contact your pharmacy at least 5 days prior to running out of medication and have them send an electronic request or submit request through the “request re Depending on your insurance carrier, approval may take 3-10 days. It is highly recommended patients contact their insurance carrier directly to determine coverage.   If test is done without insurance authorization, patient may be responsible for the entire tib-fib  -MRI of the lumbar spine with and without  -This likely is aggravated arthritis in the right knee and may have a contusion to his right tibia  -Also he may have either irritated the right L4 nerve or have a right L4-5 disc herniation  -Follow-up a

## 2021-11-30 NOTE — PROGRESS NOTES
Pt is here regarding: follow up , right leg pain    Pain level at this moment:  7/10    What 1 location is your pain most intense:  Pt states he is have right leg pain were it gets up to a 10/10 pain, has numbness/tiggling in the right leg.  Pt drags his fo

## 2021-12-08 ENCOUNTER — TELEPHONE (OUTPATIENT)
Dept: NEUROLOGY | Facility: CLINIC | Age: 71
End: 2021-12-08

## 2021-12-08 NOTE — TELEPHONE ENCOUNTER
Patient is scheduled for 12/17/2021 @ [facility]    NOTE: Once an adverse determination letter has been issued, the physician who made the   adverse determination cannot reverse the original decision based upon a Pre-Determination  Consult.  Inste 12/9/2021.  Please call [phone],   select the prompt associated with the request type and enter in reference number [id number] to speak with a clinician about this request. You may fax additional clinical information to support this request to [phone]

## 2021-12-08 NOTE — TELEPHONE ENCOUNTER
Case # [id number]     JJNDOH number provided below to schedule peer to peer and spoke with Daniel Howard. Who informed that the peer to peer must be completed by 1000 on 12/9/21.     Peer to peer scheduled for 12/9/21 at 5     Dr. Brittany Salamanca will be ca

## 2021-12-09 NOTE — TELEPHONE ENCOUNTER
Authorization confirmed on [employer] and noted in referral.  Patient is already scheduled for 12/17/21 @ [facility]. Thank you.

## 2021-12-09 NOTE — TELEPHONE ENCOUNTER
Uwxl-zi-otey completed with Dr. Veronica Martin from [facility]    MRI of the lumbar spine with and without contrast    Authorization given.   A [id number]  Expiration 6/7/2022

## 2021-12-09 NOTE — TELEPHONE ENCOUNTER
Insurance provider hung up while on hold. Peer to peer not conducted.  Routed to PA team to inform and inquire if appeal is option

## 2021-12-13 ENCOUNTER — TELEPHONE (OUTPATIENT)
Dept: SURGERY | Facility: CLINIC | Age: 71
End: 2021-12-13

## 2021-12-13 RX ORDER — TRAMADOL HYDROCHLORIDE 50 MG/1
50 TABLET ORAL EVERY 6 HOURS PRN
Qty: 60 TABLET | Refills: 0 | Status: SHIPPED | OUTPATIENT
Start: 2021-12-13 | End: 2021-12-20

## 2021-12-13 NOTE — TELEPHONE ENCOUNTER
Medication: Tramadol     Date of last refill: 3/25/21 (#30/0)  [place]       Last office visit: 11/30/21  Due back to clinic per last office note:     Date next office visit scheduled:    Future Appointments   Date Time

## 2021-12-13 NOTE — TELEPHONE ENCOUNTER
Patient calling to request refill of Tramadohl  HCL  50 mg tabs  [facility]    Patient informed of 48 hour refill policy excluding weekends and holidays. Informed patient prescription is sent directly to pharmacy.     Further explained patient w

## 2021-12-17 ENCOUNTER — HOSPITAL ENCOUNTER (OUTPATIENT)
Dept: MRI IMAGING | Age: 71
Discharge: HOME | End: 2021-12-17
Attending: PHYSICIAN ASSISTANT
Payer: MEDICARE

## 2021-12-17 DIAGNOSIS — R29.898 RIGHT LEG WEAKNESS: ICD-10-CM

## 2021-12-17 DIAGNOSIS — M51.16 LUMBAR DISC HERNIATION WITH RADICULOPATHY: ICD-10-CM

## 2021-12-17 PROCEDURE — A9575 INJ GADOTERATE MEGLUMI 0.1ML: HCPCS | Performed by: PHYSICIAN ASSISTANT

## 2021-12-17 PROCEDURE — 72158 MRI LUMBAR SPINE W/O & W/DYE: CPT | Performed by: PHYSICIAN ASSISTANT

## 2021-12-20 ENCOUNTER — OFFICE VISIT (OUTPATIENT)
Dept: SURGERY | Facility: CLINIC | Age: 71
End: 2021-12-20
Payer: MEDICARE

## 2021-12-20 VITALS
HEIGHT: 70 IN | BODY MASS INDEX: 25.05 KG/M2 | HEART RATE: 68 BPM | WEIGHT: 175 LBS | SYSTOLIC BLOOD PRESSURE: 118 MMHG | DIASTOLIC BLOOD PRESSURE: 60 MMHG

## 2021-12-20 DIAGNOSIS — R29.898 RIGHT LEG WEAKNESS: ICD-10-CM

## 2021-12-20 DIAGNOSIS — M51.16 LUMBAR DISC HERNIATION WITH RADICULOPATHY: Primary | ICD-10-CM

## 2021-12-20 PROCEDURE — 99214 OFFICE O/P EST MOD 30 MIN: CPT | Performed by: PHYSICIAN ASSISTANT

## 2021-12-20 PROCEDURE — 3074F SYST BP LT 130 MM HG: CPT | Performed by: PHYSICIAN ASSISTANT

## 2021-12-20 PROCEDURE — 3078F DIAST BP <80 MM HG: CPT | Performed by: PHYSICIAN ASSISTANT

## 2021-12-20 PROCEDURE — 3008F BODY MASS INDEX DOCD: CPT | Performed by: PHYSICIAN ASSISTANT

## 2021-12-20 RX ORDER — TRAMADOL HYDROCHLORIDE 50 MG/1
50 TABLET ORAL EVERY 6 HOURS PRN
Qty: 60 TABLET | Refills: 0 | Status: SHIPPED | OUTPATIENT
Start: 2021-12-20

## 2021-12-20 RX ORDER — KETOCONAZOLE 20 MG/ML
SHAMPOO TOPICAL
COMMUNITY
Start: 2021-12-02

## 2021-12-20 RX ORDER — ETANERCEPT 50 MG/ML
SOLUTION SUBCUTANEOUS
COMMUNITY
Start: 2021-12-03

## 2021-12-20 RX ORDER — PIMECROLIMUS 10 MG/G
1 CREAM TOPICAL 2 TIMES DAILY
COMMUNITY
Start: 2021-12-02

## 2021-12-20 NOTE — PROGRESS NOTES
[facility]   Follow up      Marina [address] Rommel Salomon is a 66year old right-handed male returns in follow-up. Low back pain is a 4/10.   The right leg is about a 6/10 at worst the right hip is worse when he is laying down at a 7 11 of 2021 he had some improvement with his foot but really no improvement with his overall pain. It is in this capacity that is here. He does have cervical pain which is 8/10 he denies any arm pain numbness tingling or weakness.   He denies any thoraci extremity strength:     Iliopsoas  Hamstrings   Quads    D-flexion P-flexion Eversion   Right       4         5       4+         5 5    Left       5-         5       5         5 5      Last exam  Reflexes DTRs: 1/4, left patella is absent      IMAGING:  X- osteoarthritis  Right shin pain, possible contusion    PLAN:  -Tramadol for pain  -Already had a round of steroids for Derm issues.  -He was offered an epidural or PT declined-overall he is feeling better he feels like his strength is slightly better he wo

## 2021-12-20 NOTE — PATIENT INSTRUCTIONS
Refill policies:    • Allow 2-3 business days for refills; controlled substances may take longer.   • Contact your pharmacy at least 5 days prior to running out of medication and have them send an electronic request or submit request through the “request re Depending on your insurance carrier, approval may take 3-10 days. It is highly recommended patients contact their insurance carrier directly to determine coverage.   If test is done without insurance authorization, patient may be responsible for the entire declined-overall he is feeling better he feels like his strength is slightly better he would like to try and avoid any surgical intervention.  -He will follow up with Dr. Hernandez Going in February if he has increasing symptoms he will let me know and I will reev

## 2021-12-20 NOTE — PROGRESS NOTES
Pt is here regarding: follow up , review imaging , lower back     Pain level at this moment:  2/10      Pt states he back is ok when he's on his medication. Wakes up in  the middle of the night with right hip pain with numbness/tiggling in the right calf.

## 2022-05-06 ENCOUNTER — OFFICE VISIT (OUTPATIENT)
Dept: OTOLARYNGOLOGY | Facility: CLINIC | Age: 72
End: 2022-05-06
Payer: MEDICARE

## 2022-05-06 ENCOUNTER — NURSE ONLY (OUTPATIENT)
Dept: AUDIOLOGY | Facility: CLINIC | Age: 72
End: 2022-05-06
Payer: MEDICARE

## 2022-05-06 VITALS — BODY MASS INDEX: 28.63 KG/M2 | HEIGHT: 70 IN | TEMPERATURE: 98 F | WEIGHT: 200 LBS

## 2022-05-06 DIAGNOSIS — H61.23 BILATERAL IMPACTED CERUMEN: ICD-10-CM

## 2022-05-06 DIAGNOSIS — H91.90 HEARING LOSS, UNSPECIFIED HEARING LOSS TYPE, UNSPECIFIED LATERALITY: Primary | ICD-10-CM

## 2022-05-06 PROCEDURE — 99203 OFFICE O/P NEW LOW 30 MIN: CPT | Performed by: OTOLARYNGOLOGY

## 2022-05-06 PROCEDURE — 3008F BODY MASS INDEX DOCD: CPT | Performed by: OTOLARYNGOLOGY

## 2022-05-06 RX ORDER — METHOTREXATE 2.5 MG/1
TABLET ORAL
COMMUNITY
Start: 2022-04-21

## 2022-05-06 RX ORDER — MAGNESIUM OXIDE 100 %
POWDER (GRAM) MISCELLANEOUS DAILY
COMMUNITY

## 2022-05-06 RX ORDER — CYCLOSPORINE 25 MG/1
50 CAPSULE, LIQUID FILLED ORAL NIGHTLY
COMMUNITY
Start: 2022-04-09

## 2022-05-06 RX ORDER — CYCLOSPORINE 100 MG/1
100 CAPSULE, LIQUID FILLED ORAL DAILY
COMMUNITY
Start: 2022-04-09

## 2022-05-06 RX ORDER — FOLIC ACID 1 MG/1
TABLET ORAL
COMMUNITY
Start: 2022-04-21

## 2022-06-15 ENCOUNTER — OFFICE VISIT (OUTPATIENT)
Dept: OPHTHALMOLOGY | Facility: CLINIC | Age: 72
End: 2022-06-15
Payer: MEDICARE

## 2022-06-15 DIAGNOSIS — Z96.1 PSEUDOPHAKIA OF BOTH EYES: Primary | ICD-10-CM

## 2022-06-15 DIAGNOSIS — H43.393 FLOATERS IN VISUAL FIELD, BILATERAL: ICD-10-CM

## 2022-06-15 PROCEDURE — 92014 COMPRE OPH EXAM EST PT 1/>: CPT | Performed by: OPHTHALMOLOGY

## 2022-06-15 NOTE — PATIENT INSTRUCTIONS
Pseudophakia of both eyes  Continue with same glasses. No treatment. Floaters in visual field, bilateral   There is no evidence of retinal pathology. All signs and symptoms of retinal detachment/tears explained in detail. Patient instructed to call the office if they experience increase in floaters, increase in flashes of light, loss of vision or curtain or veil effect.

## 2023-04-07 ENCOUNTER — OFFICE VISIT (OUTPATIENT)
Dept: FAMILY MEDICINE CLINIC | Facility: CLINIC | Age: 73
End: 2023-04-07
Payer: MEDICARE

## 2023-04-07 VITALS
HEIGHT: 70 IN | TEMPERATURE: 99 F | RESPIRATION RATE: 18 BRPM | WEIGHT: 195 LBS | SYSTOLIC BLOOD PRESSURE: 119 MMHG | HEART RATE: 74 BPM | DIASTOLIC BLOOD PRESSURE: 56 MMHG | OXYGEN SATURATION: 98 % | BODY MASS INDEX: 27.92 KG/M2

## 2023-04-07 DIAGNOSIS — N39.0 ACUTE UTI (URINARY TRACT INFECTION): Primary | ICD-10-CM

## 2023-04-07 DIAGNOSIS — R39.9 UTI SYMPTOMS: ICD-10-CM

## 2023-04-07 LAB
APPEARANCE: CLEAR
BILIRUBIN: NEGATIVE
GLUCOSE (URINE DIPSTICK): NEGATIVE MG/DL
MULTISTIX LOT#: ABNORMAL NUMERIC
NITRITE, URINE: NEGATIVE
PH, URINE: 5.5 (ref 4.5–8)
PROTEIN (URINE DIPSTICK): 30 MG/DL
SPECIFIC GRAVITY: 1.03 (ref 1–1.03)
URINE-COLOR: YELLOW
UROBILINOGEN,SEMI-QN: 0.2 MG/DL (ref 0–1.9)

## 2023-04-07 PROCEDURE — 87086 URINE CULTURE/COLONY COUNT: CPT

## 2023-04-07 PROCEDURE — 99213 OFFICE O/P EST LOW 20 MIN: CPT

## 2023-04-07 PROCEDURE — 87186 SC STD MICRODIL/AGAR DIL: CPT

## 2023-04-07 PROCEDURE — 3074F SYST BP LT 130 MM HG: CPT

## 2023-04-07 PROCEDURE — 81003 URINALYSIS AUTO W/O SCOPE: CPT

## 2023-04-07 PROCEDURE — 3008F BODY MASS INDEX DOCD: CPT

## 2023-04-07 PROCEDURE — 3078F DIAST BP <80 MM HG: CPT

## 2023-04-07 PROCEDURE — 87077 CULTURE AEROBIC IDENTIFY: CPT

## 2023-04-07 RX ORDER — CEPHALEXIN 500 MG/1
500 CAPSULE ORAL 2 TIMES DAILY
Qty: 20 CAPSULE | Refills: 0 | Status: SHIPPED | OUTPATIENT
Start: 2023-04-07 | End: 2023-04-17

## 2023-04-07 RX ORDER — CEPHALEXIN 500 MG/1
500 CAPSULE ORAL 2 TIMES DAILY
Qty: 20 CAPSULE | Refills: 0 | Status: CANCELLED | OUTPATIENT
Start: 2023-04-07 | End: 2023-04-17

## 2023-05-19 ENCOUNTER — LAB ENCOUNTER (OUTPATIENT)
Dept: LAB | Facility: REFERENCE LAB | Age: 73
End: 2023-05-19
Attending: Other
Payer: MEDICARE

## 2023-05-19 DIAGNOSIS — G62.9 NEUROPATHY: ICD-10-CM

## 2023-05-19 DIAGNOSIS — R41.89 COGNITIVE IMPAIRMENT: ICD-10-CM

## 2023-05-19 LAB
AMMONIA PLAS-MCNC: 16 UMOL/L (ref 11–32)
FOLATE SERPL-MCNC: >20 NG/ML (ref 8.7–?)
TSI SER-ACNC: 2.42 MIU/ML (ref 0.36–3.74)

## 2023-05-19 PROCEDURE — 86334 IMMUNOFIX E-PHORESIS SERUM: CPT

## 2023-05-19 PROCEDURE — 82525 ASSAY OF COPPER: CPT

## 2023-05-19 PROCEDURE — 83921 ORGANIC ACID SINGLE QUANT: CPT

## 2023-05-19 PROCEDURE — 82746 ASSAY OF FOLIC ACID SERUM: CPT

## 2023-05-19 PROCEDURE — 83521 IG LIGHT CHAINS FREE EACH: CPT

## 2023-05-19 PROCEDURE — 36415 COLL VENOUS BLD VENIPUNCTURE: CPT

## 2023-05-19 PROCEDURE — 82140 ASSAY OF AMMONIA: CPT

## 2023-05-19 PROCEDURE — 84443 ASSAY THYROID STIM HORMONE: CPT

## 2023-05-19 PROCEDURE — 84207 ASSAY OF VITAMIN B-6: CPT

## 2023-05-19 PROCEDURE — 84425 ASSAY OF VITAMIN B-1: CPT

## 2023-05-22 LAB
KAPPA LC FREE SER-MCNC: 3.25 MG/DL (ref 0.33–1.94)
KAPPA LC FREE/LAMBDA FREE SER NEPH: 0.91 {RATIO} (ref 0.26–1.65)
LAMBDA LC FREE SERPL-MCNC: 3.56 MG/DL (ref 0.57–2.63)
VITAMIN B6: 7.4 UG/L

## 2023-05-23 LAB
COPPER: 82 UG/DL
VITAMIN B1 WHOLE BLD: 100.6 NMOL/L

## 2023-05-25 LAB — METHYLMALONIC ACID: 134 NMOL/L

## 2023-06-13 DIAGNOSIS — D47.2 MGUS (MONOCLONAL GAMMOPATHY OF UNKNOWN SIGNIFICANCE): Primary | ICD-10-CM

## 2023-06-16 ENCOUNTER — HOSPITAL ENCOUNTER (OUTPATIENT)
Dept: MRI IMAGING | Facility: HOSPITAL | Age: 73
Discharge: HOME | End: 2023-06-16
Attending: Other
Payer: MEDICARE

## 2023-06-16 DIAGNOSIS — R41.89 COGNITIVE IMPAIRMENT: ICD-10-CM

## 2023-06-16 PROCEDURE — 70551 MRI BRAIN STEM W/O DYE: CPT | Performed by: OTHER

## 2023-07-06 ENCOUNTER — OFFICE VISIT (OUTPATIENT)
Dept: OTOLARYNGOLOGY | Facility: CLINIC | Age: 73
End: 2023-07-06

## 2023-07-06 VITALS — HEIGHT: 70 IN | WEIGHT: 195 LBS | BODY MASS INDEX: 27.92 KG/M2

## 2023-07-06 DIAGNOSIS — H61.23 BILATERAL IMPACTED CERUMEN: Primary | ICD-10-CM

## 2023-07-06 PROCEDURE — 69210 REMOVE IMPACTED EAR WAX UNI: CPT | Performed by: OTOLARYNGOLOGY

## 2023-07-06 PROCEDURE — 1159F MED LIST DOCD IN RCRD: CPT | Performed by: OTOLARYNGOLOGY

## 2023-07-06 PROCEDURE — 1160F RVW MEDS BY RX/DR IN RCRD: CPT | Performed by: OTOLARYNGOLOGY

## 2023-07-06 PROCEDURE — 3008F BODY MASS INDEX DOCD: CPT | Performed by: OTOLARYNGOLOGY

## 2023-07-06 RX ORDER — APREMILAST 30 MG/1
1 TABLET, FILM COATED ORAL AS DIRECTED
COMMUNITY

## 2023-07-06 RX ORDER — ROFLUMILAST 500 UG/1
1 TABLET ORAL AS DIRECTED
COMMUNITY

## 2023-07-15 ENCOUNTER — APPOINTMENT (OUTPATIENT)
Dept: GENERAL RADIOLOGY | Facility: HOSPITAL | Age: 73
End: 2023-07-15
Attending: HOSPITALIST
Payer: MEDICARE

## 2023-07-15 ENCOUNTER — HOSPITAL ENCOUNTER (OUTPATIENT)
Facility: HOSPITAL | Age: 73
Setting detail: OBSERVATION
Discharge: HOME | End: 2023-07-20
Attending: EMERGENCY MEDICINE | Admitting: HOSPITALIST
Payer: MEDICARE

## 2023-07-15 ENCOUNTER — APPOINTMENT (OUTPATIENT)
Dept: CT IMAGING | Facility: HOSPITAL | Age: 73
End: 2023-07-15
Attending: EMERGENCY MEDICINE
Payer: MEDICARE

## 2023-07-15 DIAGNOSIS — R50.9 FEBRILE ILLNESS, ACUTE: Primary | ICD-10-CM

## 2023-07-15 DIAGNOSIS — R53.1 WEAKNESS GENERALIZED: ICD-10-CM

## 2023-07-15 LAB
ANION GAP SERPL CALC-SCNC: 8 MMOL/L (ref 0–18)
BASOPHILS # BLD AUTO: 0.02 X10(3) UL (ref 0–0.2)
BASOPHILS NFR BLD AUTO: 0.3 %
BETA STREP GRP A SCREEN: NEGATIVE
BILIRUB UR QL: NEGATIVE
BUN BLD-MCNC: 26 MG/DL (ref 7–18)
BUN/CREAT SERPL: 18.1 (ref 10–20)
CALCIUM BLD-MCNC: 9 MG/DL (ref 8.5–10.1)
CHLORIDE SERPL-SCNC: 107 MMOL/L (ref 98–112)
CLARITY UR: CLEAR
CO2 SERPL-SCNC: 22 MMOL/L (ref 21–32)
CREAT BLD-MCNC: 1.44 MG/DL
DEPRECATED RDW RBC AUTO: 45.5 FL (ref 35.1–46.3)
EOSINOPHIL # BLD AUTO: 0.03 X10(3) UL (ref 0–0.7)
EOSINOPHIL NFR BLD AUTO: 0.4 %
ERYTHROCYTE [DISTWIDTH] IN BLOOD BY AUTOMATED COUNT: 12.8 % (ref 11–15)
GFR SERPLBLD BASED ON 1.73 SQ M-ARVRAT: 51 ML/MIN/1.73M2 (ref 60–?)
GLUCOSE BLD-MCNC: 94 MG/DL (ref 70–99)
GLUCOSE UR-MCNC: NORMAL MG/DL
HCT VFR BLD AUTO: 39.5 %
HGB BLD-MCNC: 13.5 G/DL
HGB UR QL STRIP.AUTO: NEGATIVE
IMM GRANULOCYTES # BLD AUTO: 0.03 X10(3) UL (ref 0–1)
IMM GRANULOCYTES NFR BLD: 0.4 %
KETONES UR-MCNC: NEGATIVE MG/DL
LEUKOCYTE ESTERASE UR QL STRIP.AUTO: NEGATIVE
LYMPHOCYTES # BLD AUTO: 0.44 X10(3) UL (ref 1–4)
LYMPHOCYTES NFR BLD AUTO: 6.6 %
MCH RBC QN AUTO: 32.7 PG (ref 26–34)
MCHC RBC AUTO-ENTMCNC: 34.2 G/DL (ref 31–37)
MCV RBC AUTO: 95.6 FL
MONOCYTES # BLD AUTO: 0.88 X10(3) UL (ref 0.1–1)
MONOCYTES NFR BLD AUTO: 13.1 %
NEUTROPHILS # BLD AUTO: 5.31 X10 (3) UL (ref 1.5–7.7)
NEUTROPHILS # BLD AUTO: 5.31 X10(3) UL (ref 1.5–7.7)
NEUTROPHILS NFR BLD AUTO: 79.2 %
NITRITE UR QL STRIP.AUTO: NEGATIVE
OSMOLALITY SERPL CALC.SUM OF ELEC: 289 MOSM/KG (ref 275–295)
PH UR: 6.5 [PH] (ref 5–8)
PLATELET # BLD AUTO: 204 10(3)UL (ref 150–450)
POTASSIUM SERPL-SCNC: 4.1 MMOL/L (ref 3.5–5.1)
RBC # BLD AUTO: 4.13 X10(6)UL
SODIUM SERPL-SCNC: 137 MMOL/L (ref 136–145)
SP GR UR STRIP: 1.03 (ref 1–1.03)
UROBILINOGEN UR STRIP-ACNC: NORMAL
WBC # BLD AUTO: 6.7 X10(3) UL (ref 4–11)

## 2023-07-15 PROCEDURE — 70450 CT HEAD/BRAIN W/O DYE: CPT | Performed by: EMERGENCY MEDICINE

## 2023-07-15 PROCEDURE — 71045 X-RAY EXAM CHEST 1 VIEW: CPT | Performed by: HOSPITALIST

## 2023-07-15 PROCEDURE — 99222 1ST HOSP IP/OBS MODERATE 55: CPT | Performed by: HOSPITALIST

## 2023-07-15 RX ORDER — ACETAMINOPHEN 325 MG/1
650 TABLET ORAL ONCE
Status: COMPLETED | OUTPATIENT
Start: 2023-07-15 | End: 2023-07-15

## 2023-07-15 RX ORDER — BISACODYL 10 MG
10 SUPPOSITORY, RECTAL RECTAL
Status: DISCONTINUED | OUTPATIENT
Start: 2023-07-15 | End: 2023-07-20

## 2023-07-15 RX ORDER — DONEPEZIL HYDROCHLORIDE 5 MG/1
5 TABLET, FILM COATED ORAL NIGHTLY
Status: DISCONTINUED | OUTPATIENT
Start: 2023-07-15 | End: 2023-07-20

## 2023-07-15 RX ORDER — SENNOSIDES 8.6 MG
17.2 TABLET ORAL NIGHTLY PRN
Status: DISCONTINUED | OUTPATIENT
Start: 2023-07-15 | End: 2023-07-20

## 2023-07-15 RX ORDER — METOCLOPRAMIDE HYDROCHLORIDE 5 MG/ML
5 INJECTION INTRAMUSCULAR; INTRAVENOUS EVERY 8 HOURS PRN
Status: DISCONTINUED | OUTPATIENT
Start: 2023-07-15 | End: 2023-07-20

## 2023-07-15 RX ORDER — AMOXICILLIN AND CLAVULANATE POTASSIUM 875; 125 MG/1; MG/1
875 TABLET, FILM COATED ORAL 2 TIMES DAILY
Status: DISCONTINUED | OUTPATIENT
Start: 2023-07-15 | End: 2023-07-18

## 2023-07-15 RX ORDER — POLYETHYLENE GLYCOL 3350 17 G/17G
17 POWDER, FOR SOLUTION ORAL DAILY PRN
Status: DISCONTINUED | OUTPATIENT
Start: 2023-07-15 | End: 2023-07-20

## 2023-07-15 RX ORDER — SODIUM CHLORIDE 9 MG/ML
INJECTION, SOLUTION INTRAVENOUS CONTINUOUS
Status: DISCONTINUED | OUTPATIENT
Start: 2023-07-15 | End: 2023-07-17

## 2023-07-15 RX ORDER — MELATONIN
3 NIGHTLY PRN
Status: DISCONTINUED | OUTPATIENT
Start: 2023-07-15 | End: 2023-07-20

## 2023-07-15 RX ORDER — ACETAMINOPHEN 500 MG
500 TABLET ORAL EVERY 4 HOURS PRN
Status: DISCONTINUED | OUTPATIENT
Start: 2023-07-15 | End: 2023-07-20

## 2023-07-15 RX ORDER — FOLIC ACID 1 MG/1
1 TABLET ORAL DAILY
Status: DISCONTINUED | OUTPATIENT
Start: 2023-07-15 | End: 2023-07-20

## 2023-07-15 RX ORDER — BUPROPION HYDROCHLORIDE 150 MG/1
150 TABLET ORAL DAILY
Status: DISCONTINUED | OUTPATIENT
Start: 2023-07-15 | End: 2023-07-20

## 2023-07-15 RX ORDER — MEMANTINE HYDROCHLORIDE 5 MG/1
5 TABLET ORAL
Status: DISCONTINUED | OUTPATIENT
Start: 2023-07-15 | End: 2023-07-20

## 2023-07-15 RX ORDER — MELATONIN
6 NIGHTLY
Status: DISCONTINUED | OUTPATIENT
Start: 2023-07-15 | End: 2023-07-20

## 2023-07-15 RX ORDER — TAMSULOSIN HYDROCHLORIDE 0.4 MG/1
0.4 CAPSULE ORAL 2 TIMES DAILY
Status: DISCONTINUED | OUTPATIENT
Start: 2023-07-15 | End: 2023-07-20

## 2023-07-15 RX ORDER — BENZONATATE 100 MG/1
200 CAPSULE ORAL 3 TIMES DAILY PRN
Status: DISCONTINUED | OUTPATIENT
Start: 2023-07-15 | End: 2023-07-20

## 2023-07-15 RX ORDER — ONDANSETRON 2 MG/ML
4 INJECTION INTRAMUSCULAR; INTRAVENOUS EVERY 6 HOURS PRN
Status: DISCONTINUED | OUTPATIENT
Start: 2023-07-15 | End: 2023-07-20

## 2023-07-15 RX ORDER — ROFLUMILAST 500 UG/1
1 TABLET ORAL DAILY
Status: DISCONTINUED | OUTPATIENT
Start: 2023-07-15 | End: 2023-07-20

## 2023-07-15 RX ORDER — MEMANTINE HYDROCHLORIDE 5 MG/1
5 TABLET ORAL DAILY
Status: DISCONTINUED | OUTPATIENT
Start: 2023-07-15 | End: 2023-07-15

## 2023-07-15 RX ORDER — HEPARIN SODIUM 5000 [USP'U]/ML
5000 INJECTION, SOLUTION INTRAVENOUS; SUBCUTANEOUS EVERY 8 HOURS SCHEDULED
Status: DISCONTINUED | OUTPATIENT
Start: 2023-07-15 | End: 2023-07-20

## 2023-07-15 RX ORDER — TAMSULOSIN HYDROCHLORIDE 0.4 MG/1
0.4 CAPSULE ORAL
Status: DISCONTINUED | OUTPATIENT
Start: 2023-07-15 | End: 2023-07-15

## 2023-07-15 RX ORDER — CERAMIDES 1,3,6-II
CREAM (GRAM) TOPICAL DAILY
Status: DISCONTINUED | OUTPATIENT
Start: 2023-07-15 | End: 2023-07-15 | Stop reason: RX

## 2023-07-15 RX ORDER — ENEMA 19; 7 G/133ML; G/133ML
1 ENEMA RECTAL ONCE AS NEEDED
Status: DISCONTINUED | OUTPATIENT
Start: 2023-07-15 | End: 2023-07-20

## 2023-07-15 RX ORDER — IPRATROPIUM BROMIDE AND ALBUTEROL SULFATE 2.5; .5 MG/3ML; MG/3ML
3 SOLUTION RESPIRATORY (INHALATION) EVERY 6 HOURS PRN
Status: DISCONTINUED | OUTPATIENT
Start: 2023-07-15 | End: 2023-07-20

## 2023-07-15 NOTE — ED INITIAL ASSESSMENT (HPI)
Patient here by [facility] for weakness and fall. Fall was yesterday and  couldn't get up for 15 minutes. Hit his forehead, denies loc and thinners. Bg 78 by ems. Temp 100.8 in ER.

## 2023-07-15 NOTE — ED QUICK NOTES
Orders for admission, patient is aware of plan and ready to go upstairs. Any questions, please call ED RN Trinity James at extension [phone].      Patient Covid vaccination status: Fully vaccinated     COVID Test Ordered in ED: None    COVID Suspicion at Admission: N/A    Running Infusions:  None    Mental Status/LOC at time of transport: ao3    Other pertinent information: none  CIWA score: N/A   NIH score:  N/A

## 2023-07-15 NOTE — PLAN OF CARE
A&O x2-3, appetite good, orthrostatics positive, IVF, monitor temp/ tylenol given, started Augmentin, PT/OT tomorrow, voiding freely, rapid strep neg, blood cultures pending, bed alarm, call light within reach. Patient and wife updated on care plan.     Problem: Patient Centered Care  Goal: Patient preferences are identified and integrated in the patient's plan of care  Description: Interventions:  - What would you like us to know as we care for you? -  - Provide timely, complete, and accurate information to patient/family  - Incorporate patient and family knowledge, values, beliefs, and cultural backgrounds into the planning and delivery of care  - Encourage patient/family to participate in care and decision-making at the level they choose  - Honor patient and family perspectives and choices  Outcome: Progressing

## 2023-07-16 PROBLEM — R27.0 ATAXIA: Status: ACTIVE | Noted: 2023-07-16

## 2023-07-16 LAB
AMMONIA PLAS-MCNC: 12 UMOL/L (ref 11–32)
ANION GAP SERPL CALC-SCNC: 7 MMOL/L (ref 0–18)
BASOPHILS # BLD AUTO: 0.02 X10(3) UL (ref 0–0.2)
BASOPHILS NFR BLD AUTO: 0.4 %
BUN BLD-MCNC: 17 MG/DL (ref 7–18)
BUN/CREAT SERPL: 14.8 (ref 10–20)
CALCIUM BLD-MCNC: 8.1 MG/DL (ref 8.5–10.1)
CERULOPLASMIN SERPL-MCNC: 23.9 MG/DL (ref 20–60)
CHLORIDE SERPL-SCNC: 107 MMOL/L (ref 98–112)
CK SERPL-CCNC: 330 U/L
CO2 SERPL-SCNC: 22 MMOL/L (ref 21–32)
CREAT BLD-MCNC: 1.15 MG/DL
DEPRECATED RDW RBC AUTO: 45.9 FL (ref 35.1–46.3)
EOSINOPHIL # BLD AUTO: 0 X10(3) UL (ref 0–0.7)
EOSINOPHIL NFR BLD AUTO: 0 %
ERYTHROCYTE [DISTWIDTH] IN BLOOD BY AUTOMATED COUNT: 12.8 % (ref 11–15)
GFR SERPLBLD BASED ON 1.73 SQ M-ARVRAT: 67 ML/MIN/1.73M2 (ref 60–?)
GLUCOSE BLD-MCNC: 92 MG/DL (ref 70–99)
HCT VFR BLD AUTO: 39.7 %
HGB BLD-MCNC: 13.6 G/DL
IMM GRANULOCYTES # BLD AUTO: 0.02 X10(3) UL (ref 0–1)
IMM GRANULOCYTES NFR BLD: 0.4 %
LYMPHOCYTES # BLD AUTO: 0.54 X10(3) UL (ref 1–4)
LYMPHOCYTES NFR BLD AUTO: 10.9 %
MAGNESIUM SERPL-MCNC: 1.9 MG/DL (ref 1.6–2.6)
MCH RBC QN AUTO: 33 PG (ref 26–34)
MCHC RBC AUTO-ENTMCNC: 34.3 G/DL (ref 31–37)
MCV RBC AUTO: 96.4 FL
MONOCYTES # BLD AUTO: 0.59 X10(3) UL (ref 0.1–1)
MONOCYTES NFR BLD AUTO: 11.9 %
NEUTROPHILS # BLD AUTO: 3.78 X10 (3) UL (ref 1.5–7.7)
NEUTROPHILS # BLD AUTO: 3.78 X10(3) UL (ref 1.5–7.7)
NEUTROPHILS NFR BLD AUTO: 76.4 %
OSMOLALITY SERPL CALC.SUM OF ELEC: 283 MOSM/KG (ref 275–295)
PLATELET # BLD AUTO: 189 10(3)UL (ref 150–450)
POTASSIUM SERPL-SCNC: 3.9 MMOL/L (ref 3.5–5.1)
RBC # BLD AUTO: 4.12 X10(6)UL
SODIUM SERPL-SCNC: 136 MMOL/L (ref 136–145)
T4 FREE SERPL-MCNC: 1 NG/DL (ref 0.8–1.7)
TSI SER-ACNC: 1.62 MIU/ML (ref 0.36–3.74)
VIT B12 SERPL-MCNC: 681 PG/ML (ref 193–986)
WBC # BLD AUTO: 5 X10(3) UL (ref 4–11)

## 2023-07-16 PROCEDURE — 99223 1ST HOSP IP/OBS HIGH 75: CPT | Performed by: OTHER

## 2023-07-16 PROCEDURE — 99233 SBSQ HOSP IP/OBS HIGH 50: CPT | Performed by: HOSPITALIST

## 2023-07-16 NOTE — CM/SW NOTE
07/16/23 1400   CM/SW Referral Data   Referral Source    Reason for Referral Discharge planning   Informant Patient;Spouse/Significant Other   Medical Hx   Does patient have an established PCP? Yes  Sofi Ripper)   Patient Info   Patient's Current Mental Status at Time of Assessment Oriented; Alert   Patient's [address]   Patient lives with Spouse/Significant other   Patient Status Prior to Admission   Independent with ADLs and Mobility Yes   Discharge Needs   Anticipated D/C needs Acute rehab   Services Requested   PMR Consult Requested Consult ordered   Choice of Post-Acute Provider   Informed patient of right to choose their preferred provider Yes     Pt discussed during nursing rounds. Dx febrile illness, apraxia/ataxia. From home w/spouse, independent at baseline. PT recommendation acute rehab at [place], pt and spouse will consider recommendation. Acute rehab referral sent in [address] [place] [address], PM&R requested. List of accepting facilities will be needed for choice. Ins Teo Kumar will be needed for acute rehab prior to dc. Plan: Acute rehab pending PM&R, facility choice, ins auth, and medical clearance. / to remain available for support and/or discharge planning.      [facility] [place]    [phone]

## 2023-07-16 NOTE — PHYSICAL THERAPY NOTE
PHYSICAL THERAPY EVALUATION - INPATIENT     Room Number: 443/443-A  Evaluation Date: 7/16/2023  Type of Evaluation: Initial   Physician Order: PT Eval and Treat    Presenting Problem: Febrile Illness  Co-Morbidities : Hx of dementia  Reason for Therapy: Mobility Dysfunction and Discharge Planning    PHYSICAL THERAPY ASSESSMENT     Patient is a 67year old male admitted 7/15/2023 for febrile acute illness/dizziness/fall. Patient's current functional deficits include bed mobility, functional tranfers, coordination, balance, strength, endurance, gait and stair navigation, which are below the patient's pre-admission status. Patient received sitting up in chair with spouse in room, agreeable to PT evaluation. Pt demonstrated difficulty coordinating movements and gait t/o session. He also demonstrated neglect to R side and had particular difficulty progressing RLE, mobilizing R hand onto walker  and had an ataxic gait pattern. He also demonstrating impaired visual tracking towards R side when tested. Concerns were expressed to pt's RN given this presentation. Pt required up to Mod A for safe mobilization within room and utilized support of walker. Assist needed to progress walker as pt was running walker into furniture on L side and stepping outside walker CLARENCE. Pt ended session sitting in chair with wife in room and RN updated on PT findings. Bed Mobility: NT  Transfers: Sit <> stand with Min/Mod A - needs frequent cues for appropriate hand placement and upright posture - required hand over hand assist to place R hand on walker  and for reaching back to arm rest on R side. Gait: Pt ambulates 25 ft in room with RW and Mod A - needs frequent assist to mobilize walker and cues to stay within walker CLARENCE. The patient's Approx Degree of Impairment: 57.7% has been calculated based on documentation in the [facility] '6 clicks' Inpatient Basic Mobility Short Form.   Research supports that patients with this level of impairment may benefit from Acute Rehab. Patient will benefit from continued IP PT services to address these deficits in preparation for discharge. DISCHARGE RECOMMENDATIONS  PT Discharge Recommendations: Acute rehabilitation    PLAN  PT Treatment Plan: Bed mobility; Body mechanics; Coordination; Energy conservation; Endurance; Patient education; Family education;Gait training;Neuromuscular re-educate;Strengthening;Transfer training;Balance training  Rehab Potential : Good  Frequency (Obs): 5x/week       PHYSICAL THERAPY MEDICAL/SOCIAL HISTORY     History related to current admission: Hx of dementia and falls      Problem List  Principal Problem:    Febrile illness, acute  Active Problems:    Weakness generalized      HOME SITUATION  Home Situation  Type of Home: Condo  Home Layout: One level;Elevator  Lives With: Spouse  Drives: No  Patient Owned Equipment: Rolling walker     Prior Level of [place]: Independent for ambulation in home without device - wife is home  given cognitive hx of pt. Pt does not drive. SUBJECTIVE  \"I don't like that I can't move around without assistance. \"    PHYSICAL THERAPY EXAMINATION     OBJECTIVE  Precautions: Bed/chair alarm  Fall Risk: High fall risk    WEIGHT BEARING RESTRICTION                PAIN ASSESSMENT  Ratin          COGNITION  Overall Cognitive Status:  Impaired  Safety Judgement:  decreased awareness of need for assistance and decreased awareness of need for safety    RANGE OF MOTION AND STRENGTH ASSESSMENT  Upper extremity ROM and strength are within functional limits   Lower extremity ROM is within functional limits   Lower extremity strength with functional deficits to [employer].      BALANCE  Static Sitting: Fair +  Dynamic Sitting: Fair  Static Standing: Poor +  Dynamic Standing: Poor +    ADDITIONAL TESTS                                    NEUROLOGICAL FINDINGS     Coordination - Heel to Shin: Asymmetrical;Right decreased speed;Right decreased accuracy  Coordination - Rapid Alternating Movement: Asymmetrical;Right decreased speed;Right decreased accuracy  Sensation: Lt touch diminished to daniel LEs          ACTIVITY TOLERANCE  Pulse: 79  Heart Rate Source: Monitor     BP: 125/59  BP Location: Right arm  BP Method: Automatic  Patient Position: Sitting    O2 WALK  Oxygen Therapy  SPO2% on Room Air at Rest: 98    AM-PAC '6-Clicks' INPATIENT SHORT FORM - BASIC MOBILITY  How much difficulty does the patient currently have. .. Patient Difficulty: Turning over in bed (including adjusting bedclothes, sheets and blankets)?: A Little   Patient Difficulty: Sitting down on and standing up from a chair with arms (e.g., wheelchair, bedside commode, etc.): A Little   Patient Difficulty: Moving from lying on back to sitting on the side of the bed?: A Little   How much help from another person does the patient currently need. .. Help from Another: Moving to and from a bed to a chair (including a wheelchair)?: A Lot   Help from Another: Need to walk in hospital room?: A Lot   Help from Another: Climbing 3-5 steps with a railing?: A Lot     AM-PAC Score:  Raw Score: 15   Approx Degree of Impairment: 57.7%   Standardized Score (AM-PAC Scale): 39.45   CMS Modifier (G-Code): CK    FUNCTIONAL ABILITY STATUS  Functional Mobility/Gait Assessment  Gait Assistance: Moderate assistance  Distance (ft): 25 ft  Assistive Device: Rolling walker  Pattern: Ataxic (Very unsteady, slow david)      Exercise/Education Provided: Body mechanics  Energy conservation  Functional activity tolerated  Gait training  Transfer training  Safety precautions, role of IP PT    Patient End of Session: Up in chair;Needs met;RN aware of session/findings;Call light within reach; All patient questions and concerns addressed; Family present    CURRENT GOALS    Goals to be met by: 7/23/23  Patient Goal Patient's self-stated goal is: none stated   Goal #1 Patient is able to demonstrate supine - sit EOB @ level: supervision     Goal #1   Current Status    Goal #2 Patient is able to demonstrate transfers Sit to/from Stand at assistance level: supervision with walker - rolling     Goal #2  Current Status    Goal #3 Patient is able to ambulate 150 feet with assist device: walker - rolling at assistance level: supervision   Goal #3   Current Status            Goal #5 Patient to demonstrate independence with home activity/exercise instructions provided to patient in preparation for discharge.                  Patient Evaluation Complexity Level:  History High - 3 or more personal factors and/or co-morbidities   Examination of body systems High - addressing a total of 4 or more elements   Clinical Presentation High - Unstable   Clinical Decision Making High Complexity       Gait Trainin minutes  Therapeutic Activity: 15 minutes  Neuromuscular Re-education:  minutes  Therapeutic Exercise: minutes  [place] Repositioning:  minutes  Manual Therapy:  minutes  PT High Complex Eval x10 mins

## 2023-07-16 NOTE — PLAN OF CARE
Pt is alert and oriented x2-3 forgetful. Tolerating general diet. Positive for orthostatic hypotension. Up with a walker needs help with direction. IVF infusing. Voiding, one BM overnight. Call light within reach. Fall precautions in place. Problem: Patient Centered Care  Goal: Patient preferences are identified and integrated in the patient's plan of care  Description: Interventions:  - What would you like us to know as we care for you?   - Provide timely, complete, and accurate information to patient/family  - Incorporate patient and family knowledge, values, beliefs, and cultural backgrounds into the planning and delivery of care  - Encourage patient/family to participate in care and decision-making at the level they choose  - Honor patient and family perspectives and choices  Outcome: Progressing     Problem: SAFETY ADULT - FALL  Goal: Free from fall injury  Description: INTERVENTIONS:  - Assess pt frequently for physical needs  - Identify cognitive and physical deficits and behaviors that affect risk of falls.   - [place] fall precautions as indicated by assessment.  - Educate pt/family on patient safety including physical limitations  - Instruct pt to call for assistance with activity based on assessment  - Modify environment to reduce risk of injury  - Provide assistive devices as appropriate  - Consider OT/PT consult to assist with strengthening/mobility  - Encourage toileting schedule  Outcome: Progressing     Problem: METABOLIC/FLUID AND ELECTROLYTES - ADULT  Goal: Hemodynamic stability and optimal renal function maintained  Description: INTERVENTIONS:  - Monitor labs and assess for signs and symptoms of volume excess or deficit  - Monitor intake, output and patient weight  - Monitor urine specific gravity, serum osmolarity and serum sodium as indicated or ordered  - Monitor response to interventions for patient's volume status, including labs, urine output, blood pressure (other measures as available)  - Encourage oral intake as appropriate  - Instruct patient on fluid and nutrition restrictions as appropriate  Outcome: Progressing     Problem: MUSCULOSKELETAL - ADULT  Goal: Return mobility to safest level of function  Description: INTERVENTIONS:  - Assess patient stability and activity tolerance for standing, transferring and ambulating w/ or w/o assistive devices  - Assist with transfers and ambulation using safe patient handling equipment as needed  - Ensure adequate protection for wounds/incisions during mobilization  - Obtain PT/OT consults as needed  - Advance activity as appropriate  - Communicate ordered activity level and limitations with patient/family  Outcome: Progressing

## 2023-07-16 NOTE — PROGRESS NOTES
Orders received and chart reviewed. OT spoke with PT who suspects new cerebellar stroke.   Will hold this date until further work up is conducted to rule out new CVA.         07/16/23 1300   VISIT TYPE   OT Inpatient Visit Type (Documentation Required) Attempted Evaluation

## 2023-07-17 ENCOUNTER — APPOINTMENT (OUTPATIENT)
Dept: MRI IMAGING | Facility: HOSPITAL | Age: 73
End: 2023-07-17
Attending: Other
Payer: MEDICARE

## 2023-07-17 LAB
ANION GAP SERPL CALC-SCNC: 8 MMOL/L (ref 0–18)
BASOPHILS # BLD AUTO: 0.03 X10(3) UL (ref 0–0.2)
BASOPHILS NFR BLD AUTO: 0.7 %
BUN BLD-MCNC: 14 MG/DL (ref 7–18)
BUN/CREAT SERPL: 11.2 (ref 10–20)
CALCIUM BLD-MCNC: 8.8 MG/DL (ref 8.5–10.1)
CHLORIDE SERPL-SCNC: 107 MMOL/L (ref 98–112)
CO2 SERPL-SCNC: 22 MMOL/L (ref 21–32)
CREAT BLD-MCNC: 1.25 MG/DL
DEPRECATED RDW RBC AUTO: 46.1 FL (ref 35.1–46.3)
EOSINOPHIL # BLD AUTO: 0.01 X10(3) UL (ref 0–0.7)
EOSINOPHIL NFR BLD AUTO: 0.2 %
ERYTHROCYTE [DISTWIDTH] IN BLOOD BY AUTOMATED COUNT: 12.9 % (ref 11–15)
GFR SERPLBLD BASED ON 1.73 SQ M-ARVRAT: 61 ML/MIN/1.73M2 (ref 60–?)
GLUCOSE BLD-MCNC: 93 MG/DL (ref 70–99)
HCT VFR BLD AUTO: 40.7 %
HGB BLD-MCNC: 13.8 G/DL
IMM GRANULOCYTES # BLD AUTO: 0.01 X10(3) UL (ref 0–1)
IMM GRANULOCYTES NFR BLD: 0.2 %
LYMPHOCYTES # BLD AUTO: 0.74 X10(3) UL (ref 1–4)
LYMPHOCYTES NFR BLD AUTO: 17.7 %
MAGNESIUM SERPL-MCNC: 2.1 MG/DL (ref 1.6–2.6)
MCH RBC QN AUTO: 32.9 PG (ref 26–34)
MCHC RBC AUTO-ENTMCNC: 33.9 G/DL (ref 31–37)
MCV RBC AUTO: 96.9 FL
MONOCYTES # BLD AUTO: 0.56 X10(3) UL (ref 0.1–1)
MONOCYTES NFR BLD AUTO: 13.4 %
NEUTROPHILS # BLD AUTO: 2.84 X10 (3) UL (ref 1.5–7.7)
NEUTROPHILS # BLD AUTO: 2.84 X10(3) UL (ref 1.5–7.7)
NEUTROPHILS NFR BLD AUTO: 67.8 %
OSMOLALITY SERPL CALC.SUM OF ELEC: 284 MOSM/KG (ref 275–295)
PLATELET # BLD AUTO: 204 10(3)UL (ref 150–450)
POTASSIUM SERPL-SCNC: 3.7 MMOL/L (ref 3.5–5.1)
RBC # BLD AUTO: 4.2 X10(6)UL
SODIUM SERPL-SCNC: 137 MMOL/L (ref 136–145)
T PALLIDUM AB SER QL: NEGATIVE
WBC # BLD AUTO: 4.2 X10(3) UL (ref 4–11)

## 2023-07-17 PROCEDURE — 99232 SBSQ HOSP IP/OBS MODERATE 35: CPT | Performed by: OTHER

## 2023-07-17 PROCEDURE — 99233 SBSQ HOSP IP/OBS HIGH 50: CPT | Performed by: HOSPITALIST

## 2023-07-17 PROCEDURE — 72156 MRI NECK SPINE W/O & W/DYE: CPT | Performed by: OTHER

## 2023-07-17 RX ORDER — MIDODRINE HYDROCHLORIDE 2.5 MG/1
2.5 TABLET ORAL 3 TIMES DAILY
Status: DISCONTINUED | OUTPATIENT
Start: 2023-07-17 | End: 2023-07-20

## 2023-07-17 RX ORDER — GADOTERATE MEGLUMINE 376.9 MG/ML
20 INJECTION INTRAVENOUS
Status: COMPLETED | OUTPATIENT
Start: 2023-07-17 | End: 2023-07-17

## 2023-07-17 RX ORDER — LORAZEPAM 2 MG/ML
0.5 INJECTION INTRAMUSCULAR ONCE
Status: COMPLETED | OUTPATIENT
Start: 2023-07-17 | End: 2023-07-17

## 2023-07-17 RX ORDER — LORAZEPAM 2 MG/ML
0.5 INJECTION INTRAMUSCULAR EVERY 8 HOURS PRN
Status: DISCONTINUED | OUTPATIENT
Start: 2023-07-17 | End: 2023-07-20

## 2023-07-17 NOTE — PLAN OF CARE
Report received. Pt A/Ox2-3 requiring frequent reorientation. Bed/chair alarm maintained. PT saw pt and pt ambulated with assist and walker to door and back with redirection. Pt sitting up in chair without complaints. Plan for possible repeat MRI tomorrow. Will endorse care to oncoming RN. Problem: Patient Centered Care  Goal: Patient preferences are identified and integrated in the patient's plan of care  Description: Interventions:  - What would you like us to know as we care for you?   - Provide timely, complete, and accurate information to patient/family  - Incorporate patient and family knowledge, values, beliefs, and cultural backgrounds into the planning and delivery of care  - Encourage patient/family to participate in care and decision-making at the level they choose  - Honor patient and family perspectives and choices  Outcome: Progressing     Problem: SAFETY ADULT - FALL  Goal: Free from fall injury  Description: INTERVENTIONS:  - Assess pt frequently for physical needs  - Identify cognitive and physical deficits and behaviors that affect risk of falls.   - [place] fall precautions as indicated by assessment.  - Educate pt/family on patient safety including physical limitations  - Instruct pt to call for assistance with activity based on assessment  - Modify environment to reduce risk of injury  - Provide assistive devices as appropriate  - Consider OT/PT consult to assist with strengthening/mobility  - Encourage toileting schedule  Outcome: Progressing

## 2023-07-17 NOTE — PLAN OF CARE
Pt is alert and oriented x2-3 forgetful. One time dose of Ativan given before MRI. Tolerating general diet. Positive for orthostatic hypotension. Up with a walker needs help with direction and balance. IVF infusing. Voiding, one BM overnight. Call light within reach. Fall precautions in place. Problem: Patient Centered Care  Goal: Patient preferences are identified and integrated in the patient's plan of care  Description: Interventions:  - What would you like us to know as we care for you?   - Provide timely, complete, and accurate information to patient/family  - Incorporate patient and family knowledge, values, beliefs, and cultural backgrounds into the planning and delivery of care  - Encourage patient/family to participate in care and decision-making at the level they choose  - Honor patient and family perspectives and choices  Outcome: Progressing     Problem: SAFETY ADULT - FALL  Goal: Free from fall injury  Description: INTERVENTIONS:  - Assess pt frequently for physical needs  - Identify cognitive and physical deficits and behaviors that affect risk of falls.   - [place] fall precautions as indicated by assessment.  - Educate pt/family on patient safety including physical limitations  - Instruct pt to call for assistance with activity based on assessment  - Modify environment to reduce risk of injury  - Provide assistive devices as appropriate  - Consider OT/PT consult to assist with strengthening/mobility  - Encourage toileting schedule  Outcome: Progressing     Problem: METABOLIC/FLUID AND ELECTROLYTES - ADULT  Goal: Hemodynamic stability and optimal renal function maintained  Description: INTERVENTIONS:  - Monitor labs and assess for signs and symptoms of volume excess or deficit  - Monitor intake, output and patient weight  - Monitor urine specific gravity, serum osmolarity and serum sodium as indicated or ordered  - Monitor response to interventions for patient's volume status, including labs, urine output, blood pressure (other measures as available)  - Encourage oral intake as appropriate  - Instruct patient on fluid and nutrition restrictions as appropriate  Outcome: Progressing     Problem: MUSCULOSKELETAL - ADULT  Goal: Return mobility to safest level of function  Description: INTERVENTIONS:  - Assess patient stability and activity tolerance for standing, transferring and ambulating w/ or w/o assistive devices  - Assist with transfers and ambulation using safe patient handling equipment as needed  - Ensure adequate protection for wounds/incisions during mobilization  - Obtain PT/OT consults as needed  - Advance activity as appropriate  - Communicate ordered activity level and limitations with patient/family  Outcome: Progressing

## 2023-07-17 NOTE — PROGRESS NOTES
Spoke to MRI control room and was told patient would wake up from being asleep and begin talking and hit the emergency button. They were able to complete the cervical portion but will need to attempt thoracic at a different time, possibly tomorrow?

## 2023-07-17 NOTE — PHYSICAL THERAPY NOTE
PHYSICAL THERAPY TREATMENT NOTE - INPATIENT     Room Number: 443/443-A       Presenting Problem: Febrile Illness  Co-Morbidities : Hx of dementia    Problem List  Principal Problem:    Febrile illness, acute  Active Problems:    Weakness generalized    Ataxia      PHYSICAL THERAPY ASSESSMENT     Patient seen for therapy treatment with clearance of RN, pt agreeable to OOB mobility this date. Functional Mobility: Gait belt used throughout mobility. - bed mobility: supine to/from sit min A   - sitting EOB with CGA   - transfers: sit to/from stand transfers with min A from EOB, mod A from chair with arms   - standing ex marches and squats with min A at trunk and RW for balance x 8 reps each ex    - gait: ambulation with 15' x 2 with RW, min A at trunk, max VC for sequencing      At end of session patient in chair with all needs in reach, RN aware. The patient's Approx Degree of Impairment: 50.57% has been calculated based on documentation in the [facility] '6 clicks' Inpatient Basic Mobility Short Form. Research supports that patients with this level of impairment may benefit from acute rehab. DISCHARGE RECOMMENDATIONS  PT Discharge Recommendations: Acute rehabilitation     PLAN  PT Treatment Plan: Bed mobility; Body mechanics; Coordination; Energy conservation; Endurance; Patient education; Family education;Gait training;Neuromuscular re-educate;Strengthening;Transfer training;Balance training  Frequency (Obs): 5x/week    SUBJECTIVE  \"This is better than a car wash! \"     OBJECTIVE  Precautions: Bed/chair alarm    WEIGHT BEARING RESTRICTION      No restrictions           PAIN ASSESSMENT   Ratin          BALANCE  Static Sitting: Fair  Dynamic Sitting: Fair -  Static Standing: Fair -  Dynamic Standing: Poor +      AM-PAC '6-Clicks' INPATIENT SHORT FORM - BASIC MOBILITY  How much difficulty does the patient currently have. ..   Patient Difficulty: Turning over in bed (including adjusting bedclothes, sheets and blankets)?: A Little   Patient Difficulty: Sitting down on and standing up from a chair with arms (e.g., wheelchair, bedside commode, etc.): A Little   Patient Difficulty: Moving from lying on back to sitting on the side of the bed?: A Little   How much help from another person does the patient currently need. .. Help from Another: Moving to and from a bed to a chair (including a wheelchair)?: A Little   Help from Another: Need to walk in hospital room?: A Little   Help from Another: Climbing 3-5 steps with a railing?: A Lot     AM-PAC Score:  Raw Score: 17   Approx Degree of Impairment: 50.57%   Standardized Score (AM-PAC Scale): 42.13   CMS Modifier (G-Code): CK    FUNCTIONAL ABILITY STATUS  Functional Mobility/Gait Assessment  Gait Assistance: Minimum assistance  Distance (ft): 15' x 2  Assistive Device: Rolling walker  Pattern:  (mild ataxia, min A at walker)    Patient End of Session: Up in chair;Needs met;Call light within reach;RN aware of session/findings; All patient questions and concerns addressed; Alarm set    CURRENT GOALS   Goals to be met by: 7/23/23  Patient Goal Patient's self-stated goal is: none stated   Goal #1 Patient is able to demonstrate supine - sit EOB @ level: supervision      Goal #1   Current Status     Goal #2 Patient is able to demonstrate transfers Sit to/from Stand at assistance level: supervision with walker - rolling      Goal #2  Current Status     Goal #3 Patient is able to ambulate 150 feet with assist device: walker - rolling at assistance level: supervision   Goal #3   Current Status                 Goal #5 Patient to demonstrate independence with home activity/exercise instructions provided to patient in preparation for discharge.                           Gait Training: 10 minutes  Therapeutic Activity: 15 minutes

## 2023-07-17 NOTE — PLAN OF CARE
Appetite good, IVFup with assist and walker, needs direction, neurology consulted, ordered labs and mri cervical and thoracic spine for tomorrow, mri check list done. Voiding freely, bm x1, orthostatics positive. Patient and family updated on care plan. Problem: Patient Centered Care  Goal: Patient preferences are identified and integrated in the patient's plan of care  Description: Interventions:  - What would you like us to know as we care for you? -  - Provide timely, complete, and accurate information to patient/family  - Incorporate patient and family knowledge, values, beliefs, and cultural backgrounds into the planning and delivery of care  - Encourage patient/family to participate in care and decision-making at the level they choose  - Honor patient and family perspectives and choices  Outcome: Progressing     Problem: SAFETY ADULT - FALL  Goal: Free from fall injury  Description: INTERVENTIONS:  - Assess pt frequently for physical needs  - Identify cognitive and physical deficits and behaviors that affect risk of falls.   - [place] fall precautions as indicated by assessment.  - Educate pt/family on patient safety including physical limitations  - Instruct pt to call for assistance with activity based on assessment  - Modify environment to reduce risk of injury  - Provide assistive devices as appropriate  - Consider OT/PT consult to assist with strengthening/mobility  - Encourage toileting schedule  Outcome: Progressing     Problem: METABOLIC/FLUID AND ELECTROLYTES - ADULT  Goal: Hemodynamic stability and optimal renal function maintained  Description: INTERVENTIONS:  - Monitor labs and assess for signs and symptoms of volume excess or deficit  - Monitor intake, output and patient weight  - Monitor urine specific gravity, serum osmolarity and serum sodium as indicated or ordered  - Monitor response to interventions for patient's volume status, including labs, urine output, blood pressure (other measures as available)  - Encourage oral intake as appropriate  - Instruct patient on fluid and nutrition restrictions as appropriate  Outcome: Progressing     Problem: MUSCULOSKELETAL - ADULT  Goal: Return mobility to safest level of function  Description: INTERVENTIONS:  - Assess patient stability and activity tolerance for standing, transferring and ambulating w/ or w/o assistive devices  - Assist with transfers and ambulation using safe patient handling equipment as needed  - Ensure adequate protection for wounds/incisions during mobilization  - Obtain PT/OT consults as needed  - Advance activity as appropriate  - Communicate ordered activity level and limitations with patient/family  Outcome: Progressing

## 2023-07-18 LAB
ALDOLASE: 4.9 U/L
ANION GAP SERPL CALC-SCNC: 7 MMOL/L (ref 0–18)
BASOPHILS # BLD AUTO: 0.02 X10(3) UL (ref 0–0.2)
BASOPHILS NFR BLD AUTO: 0.4 %
BUN BLD-MCNC: 18 MG/DL (ref 7–18)
BUN/CREAT SERPL: 15 (ref 10–20)
CALCIUM BLD-MCNC: 8.4 MG/DL (ref 8.5–10.1)
CHLORIDE SERPL-SCNC: 104 MMOL/L (ref 98–112)
CO2 SERPL-SCNC: 25 MMOL/L (ref 21–32)
CREAT BLD-MCNC: 1.2 MG/DL
DEPRECATED RDW RBC AUTO: 45.6 FL (ref 35.1–46.3)
EOSINOPHIL # BLD AUTO: 0.04 X10(3) UL (ref 0–0.7)
EOSINOPHIL NFR BLD AUTO: 0.9 %
ERYTHROCYTE [DISTWIDTH] IN BLOOD BY AUTOMATED COUNT: 12.9 % (ref 11–15)
GFR SERPLBLD BASED ON 1.73 SQ M-ARVRAT: 64 ML/MIN/1.73M2 (ref 60–?)
GLUCOSE BLD-MCNC: 93 MG/DL (ref 70–99)
HCT VFR BLD AUTO: 37.7 %
HGB BLD-MCNC: 12.8 G/DL
IMM GRANULOCYTES # BLD AUTO: 0.01 X10(3) UL (ref 0–1)
IMM GRANULOCYTES NFR BLD: 0.2 %
LYMPHOCYTES # BLD AUTO: 0.67 X10(3) UL (ref 1–4)
LYMPHOCYTES NFR BLD AUTO: 14.9 %
MAGNESIUM SERPL-MCNC: 1.9 MG/DL (ref 1.6–2.6)
MCH RBC QN AUTO: 33.1 PG (ref 26–34)
MCHC RBC AUTO-ENTMCNC: 34 G/DL (ref 31–37)
MCV RBC AUTO: 97.4 FL
MONOCYTES # BLD AUTO: 0.56 X10(3) UL (ref 0.1–1)
MONOCYTES NFR BLD AUTO: 12.4 %
NEUTROPHILS # BLD AUTO: 3.21 X10 (3) UL (ref 1.5–7.7)
NEUTROPHILS # BLD AUTO: 3.21 X10(3) UL (ref 1.5–7.7)
NEUTROPHILS NFR BLD AUTO: 71.2 %
OSMOLALITY SERPL CALC.SUM OF ELEC: 284 MOSM/KG (ref 275–295)
PLATELET # BLD AUTO: 189 10(3)UL (ref 150–450)
POTASSIUM SERPL-SCNC: 3.6 MMOL/L (ref 3.5–5.1)
RBC # BLD AUTO: 3.87 X10(6)UL
SODIUM SERPL-SCNC: 136 MMOL/L (ref 136–145)
WBC # BLD AUTO: 4.5 X10(3) UL (ref 4–11)

## 2023-07-18 PROCEDURE — 99233 SBSQ HOSP IP/OBS HIGH 50: CPT | Performed by: HOSPITALIST

## 2023-07-18 PROCEDURE — 99232 SBSQ HOSP IP/OBS MODERATE 35: CPT | Performed by: OTHER

## 2023-07-18 RX ORDER — ACETAMINOPHEN 500 MG
500 TABLET ORAL EVERY 4 HOURS PRN
Refills: 0 | Status: SHIPPED | COMMUNITY
Start: 2023-07-18

## 2023-07-18 RX ORDER — MIDODRINE HYDROCHLORIDE 2.5 MG/1
2.5 TABLET ORAL 3 TIMES DAILY
Refills: 0 | Status: SHIPPED | COMMUNITY
Start: 2023-07-18

## 2023-07-18 RX ORDER — IPRATROPIUM BROMIDE AND ALBUTEROL SULFATE 2.5; .5 MG/3ML; MG/3ML
3 SOLUTION RESPIRATORY (INHALATION) EVERY 6 HOURS PRN
Refills: 0 | Status: SHIPPED | COMMUNITY
Start: 2023-07-18

## 2023-07-18 RX ORDER — VALACYCLOVIR HYDROCHLORIDE 1 G/1
1000 TABLET, FILM COATED ORAL EVERY 12 HOURS SCHEDULED
Qty: 14 TABLET | Refills: 0 | Status: SHIPPED | OUTPATIENT
Start: 2023-07-18 | End: 2023-07-25

## 2023-07-18 RX ORDER — AMOXICILLIN 500 MG/1
500 CAPSULE ORAL EVERY 8 HOURS
Status: DISCONTINUED | OUTPATIENT
Start: 2023-07-18 | End: 2023-07-20

## 2023-07-18 RX ORDER — VALACYCLOVIR HYDROCHLORIDE 500 MG/1
1000 TABLET, FILM COATED ORAL EVERY 12 HOURS SCHEDULED
Status: DISCONTINUED | OUTPATIENT
Start: 2023-07-18 | End: 2023-07-20

## 2023-07-18 RX ORDER — BENZONATATE 200 MG/1
200 CAPSULE ORAL 3 TIMES DAILY PRN
Refills: 0 | Status: SHIPPED | COMMUNITY
Start: 2023-07-18

## 2023-07-18 RX ORDER — BISACODYL 10 MG
10 SUPPOSITORY, RECTAL RECTAL
Refills: 0 | Status: SHIPPED | COMMUNITY
Start: 2023-07-18

## 2023-07-18 RX ORDER — AMOXICILLIN 500 MG/1
500 CAPSULE ORAL EVERY 8 HOURS
Qty: 9 CAPSULE | Refills: 0 | Status: SHIPPED | OUTPATIENT
Start: 2023-07-18 | End: 2023-07-21

## 2023-07-18 NOTE — PLAN OF CARE
No additional complaints overnight. Ambulating with CGA x 1 and walker to bathroom d/t ataxia and unsteadiness. Awaiting PMR eval. Plan for potential repeat MRI in AM.    Problem: Patient Centered Care  Goal: Patient preferences are identified and integrated in the patient's plan of care  Description: Interventions:  - What would you like us to know as we care for you? Daniele Hills lives at home with wife  - Provide timely, complete, and accurate information to patient/family  - Incorporate patient and family knowledge, values, beliefs, and cultural backgrounds into the planning and delivery of care  - Encourage patient/family to participate in care and decision-making at the level they choose  - Honor patient and family perspectives and choices  Outcome: Progressing     Problem: SAFETY ADULT - FALL  Goal: Free from fall injury  Description: INTERVENTIONS:  - Assess pt frequently for physical needs  - Identify cognitive and physical deficits and behaviors that affect risk of falls.   - [place] fall precautions as indicated by assessment.  - Educate pt/family on patient safety including physical limitations  - Instruct pt to call for assistance with activity based on assessment  - Modify environment to reduce risk of injury  - Provide assistive devices as appropriate  - Consider OT/PT consult to assist with strengthening/mobility  - Encourage toileting schedule  Outcome: Progressing     Problem: METABOLIC/FLUID AND ELECTROLYTES - ADULT  Goal: Hemodynamic stability and optimal renal function maintained  Description: INTERVENTIONS:  - Monitor labs and assess for signs and symptoms of volume excess or deficit  - Monitor intake, output and patient weight  - Monitor urine specific gravity, serum osmolarity and serum sodium as indicated or ordered  - Monitor response to interventions for patient's volume status, including labs, urine output, blood pressure (other measures as available)  - Encourage oral intake as appropriate  - Instruct patient on fluid and nutrition restrictions as appropriate  Outcome: Progressing

## 2023-07-18 NOTE — CM/SW NOTE
07/18/23 1100   Choice of Post-Acute Provider   Informed patient of right to choose their preferred provider Yes   List of appropriate post-acute services provided to patient/family with quality data Yes   Patient/family choice [facility]     Met with pt and spouse at bedside. They have chosen [facility] for [facility] on dc. Message sent to [facility] liaison to start ins auth. Plan: [facility] pending ins auth. Tracey Cruz.  Dusty Peabody RN, BSN  Nurse   [phone]

## 2023-07-19 LAB
ANION GAP SERPL CALC-SCNC: 7 MMOL/L (ref 0–18)
ANTI-HU AB: NEGATIVE
ANTI-RI AB: NEGATIVE
ANTI-YO AB: NEGATIVE
BASOPHILS # BLD AUTO: 0.03 X10(3) UL (ref 0–0.2)
BASOPHILS NFR BLD AUTO: 0.7 %
BUN BLD-MCNC: 17 MG/DL (ref 7–18)
BUN/CREAT SERPL: 15.3 (ref 10–20)
CALCIUM BLD-MCNC: 8.4 MG/DL (ref 8.5–10.1)
CHLORIDE SERPL-SCNC: 106 MMOL/L (ref 98–112)
CO2 SERPL-SCNC: 22 MMOL/L (ref 21–32)
CREAT BLD-MCNC: 1.11 MG/DL
DEPRECATED RDW RBC AUTO: 44.2 FL (ref 35.1–46.3)
EOSINOPHIL # BLD AUTO: 0.06 X10(3) UL (ref 0–0.7)
EOSINOPHIL NFR BLD AUTO: 1.3 %
ERYTHROCYTE [DISTWIDTH] IN BLOOD BY AUTOMATED COUNT: 12.6 % (ref 11–15)
GFR SERPLBLD BASED ON 1.73 SQ M-ARVRAT: 70 ML/MIN/1.73M2 (ref 60–?)
GLUCOSE BLD-MCNC: 87 MG/DL (ref 70–99)
HCT VFR BLD AUTO: 37.3 %
HGB BLD-MCNC: 12.8 G/DL
IMM GRANULOCYTES # BLD AUTO: 0.02 X10(3) UL (ref 0–1)
IMM GRANULOCYTES NFR BLD: 0.4 %
LYMPHOCYTES # BLD AUTO: 1 X10(3) UL (ref 1–4)
LYMPHOCYTES NFR BLD AUTO: 22.3 %
MAGNESIUM SERPL-MCNC: 1.9 MG/DL (ref 1.6–2.6)
MCH RBC QN AUTO: 32.8 PG (ref 26–34)
MCHC RBC AUTO-ENTMCNC: 34.3 G/DL (ref 31–37)
MCV RBC AUTO: 95.6 FL
MONOCYTES # BLD AUTO: 0.49 X10(3) UL (ref 0.1–1)
MONOCYTES NFR BLD AUTO: 10.9 %
NEUTROPHILS # BLD AUTO: 2.89 X10 (3) UL (ref 1.5–7.7)
NEUTROPHILS # BLD AUTO: 2.89 X10(3) UL (ref 1.5–7.7)
NEUTROPHILS NFR BLD AUTO: 64.4 %
OSMOLALITY SERPL CALC.SUM OF ELEC: 281 MOSM/KG (ref 275–295)
PLATELET # BLD AUTO: 219 10(3)UL (ref 150–450)
POTASSIUM SERPL-SCNC: 3.7 MMOL/L (ref 3.5–5.1)
RBC # BLD AUTO: 3.9 X10(6)UL
SODIUM SERPL-SCNC: 135 MMOL/L (ref 136–145)
WBC # BLD AUTO: 4.5 X10(3) UL (ref 4–11)

## 2023-07-19 PROCEDURE — 99233 SBSQ HOSP IP/OBS HIGH 50: CPT | Performed by: HOSPITALIST

## 2023-07-19 NOTE — PHYSICAL THERAPY NOTE
PHYSICAL THERAPY TREATMENT NOTE - INPATIENT     Room Number: 443/443-A       Presenting Problem: Febrile Illness    Problem List  Principal Problem:    Febrile illness, acute  Active Problems:    Weakness generalized    Ataxia      PHYSICAL THERAPY ASSESSMENT   Chart reviewed. JOHANN Chaudhry approved participation in physical therapy. PPE worn by therapist: mask and gloves. Patient was not wearing a mask during session. Patient presented in bed and alarm activated with 0/10 pain. Patient with good  progress towards goals during this session. Education provided on Physical therapy plan of care and physiological benefits of out of bed mobility. Patient with good carryover. Pt seen in coordination with LYNNE Munguia to maximize pt outcomes. Pt verbally agreeable to therapy, gait belt donned in sitting. Reports tongue swelling, RN aware, noted to have mouth sores in corner of mouth and on tongue. Progression with ambulation and functional mobility this session, demos improvements in gait mechanics and RW management, but requires frequent cueing for safety during session. Pt noted to desat with activity, see vitals below. Pt on track to discharge to Acute Rehab once medically cleared. Vitals pre activity in josselin\i-fowlers: HR 67 bpm, /71 mmHg, Spo2 95% on RA  Vitals post activity in sitting: HR 85 bpm, Spo2 84-97% after 20 sec seated recovery on RA      Bed mobility: Contact guard assist supine to sit with HOB elevated and to scoot EOB.  Pt sat EOB about 3 mins prior to standing with SBA to maintain static seated balance  Transfers: Min assist for STS transfers with RW, cues for hand placement on bed and arm rests prior to stand/sit for safety  Gait Assistance: Minimum assistance  Distance (ft): 250 ft  Assistive Device: Rolling walker  Pattern: Shuffle (NBOS, mild ataxia), frequent cues for upright posture, keeping self within frame of walker and to widen CLARENCE to prevent falls, difficult to correct despite cues Patient was left in bedside chair and alarm activated at end of session with all needs in reach. The patient's Approx Degree of Impairment: 50.57% has been calculated based on documentation in the [facility] '6 clicks' Inpatient Basic Mobility Short Form. Research supports that patients with this level of impairment may benefit from Acute Rehab. RN aware of patient status post session. DISCHARGE RECOMMENDATIONS  PT Discharge Recommendations: Acute rehabilitation     PLAN  PT Treatment Plan: Bed mobility; Body mechanics; Coordination; Energy conservation; Endurance; Patient education; Family education;Gait training;Neuromuscular re-educate;Strengthening;Transfer training;Balance training    SUBJECTIVE  You mention herpes around me again and I'm going to bop you in the nose! OBJECTIVE  Precautions: Bed/chair alarm    WEIGHT BEARING RESTRICTION  Weight Bearing Restriction: None                PAIN ASSESSMENT   Ratin  Location: tongue swelling       BALANCE                                                                                                                       Static Sitting: Fair  Dynamic Sitting: Fair -           Static Standing: Fair -  Dynamic Standing: Poor +    ACTIVITY TOLERANCE  Pulse: 67 (85 after activity)  Heart Rate Source: Monitor     BP: 138/71  BP Location: Right arm  BP Method: Automatic  Patient Position: Semi-Vo    O2 WALK  Oxygen Therapy  SPO2% on Room Air at Rest: 95  SPO2% Ambulation on Room Air: 84 (84-97 after 20 sec rest)    AM-PAC '6-Clicks' INPATIENT SHORT FORM - BASIC MOBILITY  How much difficulty does the patient currently have. ..   Patient Difficulty: Turning over in bed (including adjusting bedclothes, sheets and blankets)?: A Little   Patient Difficulty: Sitting down on and standing up from a chair with arms (e.g., wheelchair, bedside commode, etc.): A Little   Patient Difficulty: Moving from lying on back to sitting on the side of the bed?: A Little   How much help from another person does the patient currently need. .. Help from Another: Moving to and from a bed to a chair (including a wheelchair)?: A Little   Help from Another: Need to walk in hospital room?: A Little   Help from Another: Climbing 3-5 steps with a railing?: A Lot     AM-PAC Score:  Raw Score: 17   Approx Degree of Impairment: 50.57%   Standardized Score (AM-PAC Scale): 42.13   CMS Modifier (G-Code): CK        Patient End of Session: Up in chair;Needs met;Call light within reach;RN aware of session/findings; Alarm set    CURRENT GOALS   Goals to be met by: 7/23/23  Patient Goal Patient's self-stated goal is: none stated   Goal #1 Patient is able to demonstrate supine - sit EOB @ level: supervision      Goal #1   Current Status  CGA    Goal #2 Patient is able to demonstrate transfers Sit to/from Stand at assistance level: supervision with walker - rolling      Goal #2  Current Status  Min A with RW   Goal #3 Patient is able to ambulate 150 feet with assist device: walker - rolling at assistance level: supervision   Goal #3   Current Status  250 ft with RW, Min A                Goal #5 Patient to demonstrate independence with home activity/exercise instructions provided to patient in preparation for discharge.                                          Gait Training: 15 minutes  Therapeutic Activity: 8 minutes    [facility]  [phone]

## 2023-07-19 NOTE — PLAN OF CARE
Patient alert and oriented x3. Hx dementia, can be confused/forgetful at times. Reoriented as needed. Room air. Vital signs stable. + for orthostatic. 1x BM overnight. Voiding. Denies pain/nausea. PO abx continued. Up with SBA and a walker. Call light and personal belongings within reach. Safety precautions in place. Video monitoring continued. Plan to [facility] to [facility] pending insurance authorization. Problem: Patient Centered Care  Goal: Patient preferences are identified and integrated in the patient's plan of care  Description: Interventions:  - What would you like us to know as we care for you? I have been experiencing balance issues prior to this admission. I will continue workup with neurology outpatient. - Provide timely, complete, and accurate information to patient/family  - Incorporate patient and family knowledge, values, beliefs, and cultural backgrounds into the planning and delivery of care  - Encourage patient/family to participate in care and decision-making at the level they choose  - Honor patient and family perspectives and choices  Outcome: Progressing     Problem: SAFETY ADULT - FALL  Goal: Free from fall injury  Description: INTERVENTIONS:  - Assess pt frequently for physical needs  - Identify cognitive and physical deficits and behaviors that affect risk of falls.   - [place] fall precautions as indicated by assessment.  - Educate pt/family on patient safety including physical limitations  - Instruct pt to call for assistance with activity based on assessment  - Modify environment to reduce risk of injury  - Provide assistive devices as appropriate  - Consider OT/PT consult to assist with strengthening/mobility  - Encourage toileting schedule  Outcome: Progressing     Problem: METABOLIC/FLUID AND ELECTROLYTES - ADULT  Goal: Hemodynamic stability and optimal renal function maintained  Description: INTERVENTIONS:  - Monitor labs and assess for signs and symptoms of volume excess or deficit  - Monitor intake, output and patient weight  - Monitor urine specific gravity, serum osmolarity and serum sodium as indicated or ordered  - Monitor response to interventions for patient's volume status, including labs, urine output, blood pressure (other measures as available)  - Encourage oral intake as appropriate  - Instruct patient on fluid and nutrition restrictions as appropriate  Outcome: Progressing     Problem: MUSCULOSKELETAL - ADULT  Goal: Return mobility to safest level of function  Description: INTERVENTIONS:  - Assess patient stability and activity tolerance for standing, transferring and ambulating w/ or w/o assistive devices  - Assist with transfers and ambulation using safe patient handling equipment as needed  - Ensure adequate protection for wounds/incisions during mobilization  - Obtain PT/OT consults as needed  - Advance activity as appropriate  - Communicate ordered activity level and limitations with patient/family  Outcome: Progressing     Problem: Patient/Family Goals  Goal: Patient/Family Long Term Goal  Description: Patient's Long Term Goal: Discharge without complications    Interventions:  - Work with treatment team to develop plan of care  - Utilize medications as appropriate, take prescribed medications as instructed  - Continue following up post discharge as instructed with appropriate physicians   - See additional Care Plan goals for specific interventions  Outcome: Progressing  Goal: Patient/Family Short Term Goal  Description: Patient's Short Term Goal: Pain control    Interventions:   - Work with treatment team to develop pain control plan  - Utilize PRN medications appropriately  - Use non-pharmacological pain control techniques (deep breathing, guided-imagery, heat/ice)  - See additional Care Plan goals for specific interventions  Outcome: Progressing

## 2023-07-19 NOTE — CM/SW NOTE
Per Malinda Claude from [facility] has intent to deny AR on dc. P2P scheduled for tomorrow 7/20 @9am with Dr. Cory Grider. Plan:  7/20 pending P2P    Ottoniel Smith.  Tomy Cuevas RN, BSN  Nurse   [phone]

## 2023-07-19 NOTE — PLAN OF CARE
Patient alert and oriented to self, situation and place. Impulsive at times - [place] video monitoring in use. Wife at bedside intermittently throughout shift. Tolerating diet. Voiding via urinal at bedside. Orthostatic positive. Swab of oral sores sent via order. Saline locked. Antibiotics adjusted and anti-viral added. Ambulating with rolling walker and contact guard. Heparin for DVT prophylaxis. Awaiting insurance approval at [facility]. Call light within reach, fall precautions in place, patient calling appropriately. Problem: Patient Centered Care  Goal: Patient preferences are identified and integrated in the patient's plan of care  Description: Interventions:  - What would you like us to know as we care for you? I have been experiencing balance issues prior to this admission. I will continue workup with neurology outpatient. - Provide timely, complete, and accurate information to patient/family  - Incorporate patient and family knowledge, values, beliefs, and cultural backgrounds into the planning and delivery of care  - Encourage patient/family to participate in care and decision-making at the level they choose  - Honor patient and family perspectives and choices  Outcome: Progressing     Problem: SAFETY ADULT - FALL  Goal: Free from fall injury  Description: INTERVENTIONS:  - Assess pt frequently for physical needs  - Identify cognitive and physical deficits and behaviors that affect risk of falls.   - [place] fall precautions as indicated by assessment.  - Educate pt/family on patient safety including physical limitations  - Instruct pt to call for assistance with activity based on assessment  - Modify environment to reduce risk of injury  - Provide assistive devices as appropriate  - Consider OT/PT consult to assist with strengthening/mobility  - Encourage toileting schedule  Outcome: Progressing     Problem: METABOLIC/FLUID AND ELECTROLYTES - ADULT  Goal: Hemodynamic stability and optimal renal function maintained  Description: INTERVENTIONS:  - Monitor labs and assess for signs and symptoms of volume excess or deficit  - Monitor intake, output and patient weight  - Monitor urine specific gravity, serum osmolarity and serum sodium as indicated or ordered  - Monitor response to interventions for patient's volume status, including labs, urine output, blood pressure (other measures as available)  - Encourage oral intake as appropriate  - Instruct patient on fluid and nutrition restrictions as appropriate  Outcome: Progressing     Problem: MUSCULOSKELETAL - ADULT  Goal: Return mobility to safest level of function  Description: INTERVENTIONS:  - Assess patient stability and activity tolerance for standing, transferring and ambulating w/ or w/o assistive devices  - Assist with transfers and ambulation using safe patient handling equipment as needed  - Ensure adequate protection for wounds/incisions during mobilization  - Obtain PT/OT consults as needed  - Advance activity as appropriate  - Communicate ordered activity level and limitations with patient/family  Outcome: Progressing     Problem: Patient/Family Goals  Goal: Patient/Family Long Term Goal  Description: Patient's Long Term Goal: Discharge without complications    Interventions:  - Work with treatment team to develop plan of care  - Utilize medications as appropriate, take prescribed medications as instructed  - Continue following up post discharge as instructed with appropriate physicians   - See additional Care Plan goals for specific interventions  Outcome: Progressing  Goal: Patient/Family Short Term Goal  Description: Patient's Short Term Goal: Pain control    Interventions:   - Work with treatment team to develop pain control plan  - Utilize PRN medications appropriately  - Use non-pharmacological pain control techniques (deep breathing, guided-imagery, heat/ice)  - See additional Care Plan goals for specific interventions  Outcome: Progressing

## 2023-07-19 NOTE — PLAN OF CARE
Pt is alert and oriented x3. Pleasantly confused. Dementia. On room air. Vital signs stable. Ambulating using a walker and 1 assist. Compliant with bathroom calls. Episodes of incontinence noted. Using urinal at the bedside. Pt had a BM and a shower today. On a cardiac diet. Tolerating well. Saline locked. To be discharged to a [facility]. [facility] peer scheduled [facility] at [facility] measures in place. Will continue to monitor. Problem: Patient Centered Care  Goal: Patient preferences are identified and integrated in the patient's plan of care  Description: Interventions:  - What would you like us to know as we care for you? I have been experiencing balance issues prior to this admission. I will continue workup with neurology outpatient. - Provide timely, complete, and accurate information to patient/family  - Incorporate patient and family knowledge, values, beliefs, and cultural backgrounds into the planning and delivery of care  - Encourage patient/family to participate in care and decision-making at the level they choose  - Honor patient and family perspectives and choices  Outcome: Progressing     Problem: SAFETY ADULT - FALL  Goal: Free from fall injury  Description: INTERVENTIONS:  - Assess pt frequently for physical needs  - Identify cognitive and physical deficits and behaviors that affect risk of falls.   - [place] fall precautions as indicated by assessment.  - Educate pt/family on patient safety including physical limitations  - Instruct pt to call for assistance with activity based on assessment  - Modify environment to reduce risk of injury  - Provide assistive devices as appropriate  - Consider OT/PT consult to assist with strengthening/mobility  - Encourage toileting schedule  Outcome: Progressing     Problem: METABOLIC/FLUID AND ELECTROLYTES - ADULT  Goal: Hemodynamic stability and optimal renal function maintained  Description: INTERVENTIONS:  - Monitor labs and assess for signs and symptoms of volume excess or deficit  - Monitor intake, output and patient weight  - Monitor urine specific gravity, serum osmolarity and serum sodium as indicated or ordered  - Monitor response to interventions for patient's volume status, including labs, urine output, blood pressure (other measures as available)  - Encourage oral intake as appropriate  - Instruct patient on fluid and nutrition restrictions as appropriate  Outcome: Progressing     Problem: MUSCULOSKELETAL - ADULT  Goal: Return mobility to safest level of function  Description: INTERVENTIONS:  - Assess patient stability and activity tolerance for standing, transferring and ambulating w/ or w/o assistive devices  - Assist with transfers and ambulation using safe patient handling equipment as needed  - Ensure adequate protection for wounds/incisions during mobilization  - Obtain PT/OT consults as needed  - Advance activity as appropriate  - Communicate ordered activity level and limitations with patient/family  Outcome: Progressing     Problem: Patient/Family Goals  Goal: Patient/Family Long Term Goal  Description: Patient's Long Term Goal: Discharge without complications    Interventions:  - Work with treatment team to develop plan of care  - Utilize medications as appropriate, take prescribed medications as instructed  - Continue following up post discharge as instructed with appropriate physicians   - See additional Care Plan goals for specific interventions  Outcome: Progressing  Goal: Patient/Family Short Term Goal  Description: Patient's Short Term Goal: Pain control    Interventions:   - Work with treatment team to develop pain control plan  - Utilize PRN medications appropriately  - Use non-pharmacological pain control techniques (deep breathing, guided-imagery, heat/ice)  - See additional Care Plan goals for specific interventions  Outcome: Progressing

## 2023-07-20 VITALS
HEART RATE: 81 BPM | TEMPERATURE: 98 F | WEIGHT: 193.69 LBS | SYSTOLIC BLOOD PRESSURE: 147 MMHG | BODY MASS INDEX: 28 KG/M2 | DIASTOLIC BLOOD PRESSURE: 71 MMHG | RESPIRATION RATE: 20 BRPM | OXYGEN SATURATION: 96 %

## 2023-07-20 LAB
ANION GAP SERPL CALC-SCNC: 8 MMOL/L (ref 0–18)
BASOPHILS # BLD AUTO: 0.03 X10(3) UL (ref 0–0.2)
BASOPHILS NFR BLD AUTO: 0.7 %
BUN BLD-MCNC: 19 MG/DL (ref 7–18)
BUN/CREAT SERPL: 14.6 (ref 10–20)
CALCIUM BLD-MCNC: 8.9 MG/DL (ref 8.5–10.1)
CHLORIDE SERPL-SCNC: 107 MMOL/L (ref 98–112)
CO2 SERPL-SCNC: 24 MMOL/L (ref 21–32)
CREAT BLD-MCNC: 1.3 MG/DL
DEPRECATED RDW RBC AUTO: 43.9 FL (ref 35.1–46.3)
EOSINOPHIL # BLD AUTO: 0.1 X10(3) UL (ref 0–0.7)
EOSINOPHIL NFR BLD AUTO: 2.3 %
ERYTHROCYTE [DISTWIDTH] IN BLOOD BY AUTOMATED COUNT: 12.5 % (ref 11–15)
GFR SERPLBLD BASED ON 1.73 SQ M-ARVRAT: 58 ML/MIN/1.73M2 (ref 60–?)
GLUCOSE BLD-MCNC: 93 MG/DL (ref 70–99)
HCT VFR BLD AUTO: 37.8 %
HGB BLD-MCNC: 12.9 G/DL
IMM GRANULOCYTES # BLD AUTO: 0.02 X10(3) UL (ref 0–1)
IMM GRANULOCYTES NFR BLD: 0.5 %
LYMPHOCYTES # BLD AUTO: 1.07 X10(3) UL (ref 1–4)
LYMPHOCYTES NFR BLD AUTO: 24.6 %
MAGNESIUM SERPL-MCNC: 2.1 MG/DL (ref 1.6–2.6)
MCH RBC QN AUTO: 32.3 PG (ref 26–34)
MCHC RBC AUTO-ENTMCNC: 34.1 G/DL (ref 31–37)
MCV RBC AUTO: 94.7 FL
METHYLMALONIC ACID: 114 NMOL/L
MONOCYTES # BLD AUTO: 0.45 X10(3) UL (ref 0.1–1)
MONOCYTES NFR BLD AUTO: 10.3 %
NEUTROPHILS # BLD AUTO: 2.68 X10 (3) UL (ref 1.5–7.7)
NEUTROPHILS # BLD AUTO: 2.68 X10(3) UL (ref 1.5–7.7)
NEUTROPHILS NFR BLD AUTO: 61.6 %
OSMOLALITY SERPL CALC.SUM OF ELEC: 290 MOSM/KG (ref 275–295)
PLATELET # BLD AUTO: 229 10(3)UL (ref 150–450)
POTASSIUM SERPL-SCNC: 3.7 MMOL/L (ref 3.5–5.1)
RBC # BLD AUTO: 3.99 X10(6)UL
SODIUM SERPL-SCNC: 139 MMOL/L (ref 136–145)
WBC # BLD AUTO: 4.4 X10(3) UL (ref 4–11)

## 2023-07-20 PROCEDURE — 99233 SBSQ HOSP IP/OBS HIGH 50: CPT | Performed by: HOSPITALIST

## 2023-07-20 NOTE — PLAN OF CARE
Aler and oriented x2-3. Pleasantly confused. RA. Tolerating cardiac diet. Saline locked. Voiding freely using urinal. Incontinent at times. PRN tylenol given for pain. Upx1w/ walker. Bed in lowest position. Call light within reach. Frequent rounding   provided. Problem: SAFETY ADULT - FALL  Goal: Free from fall injury  Description: INTERVENTIONS:  - Assess pt frequently for physical needs  - Identify cognitive and physical deficits and behaviors that affect risk of falls.   - [place] fall precautions as indicated by assessment.  - Educate pt/family on patient safety including physical limitations  - Instruct pt to call for assistance with activity based on assessment  - Modify environment to reduce risk of injury  - Provide assistive devices as appropriate  - Consider OT/PT consult to assist with strengthening/mobility  - Encourage toileting schedule  Outcome: Progressing     Problem: METABOLIC/FLUID AND ELECTROLYTES - ADULT  Goal: Hemodynamic stability and optimal renal function maintained  Description: INTERVENTIONS:  - Monitor labs and assess for signs and symptoms of volume excess or deficit  - Monitor intake, output and patient weight  - Monitor urine specific gravity, serum osmolarity and serum sodium as indicated or ordered  - Monitor response to interventions for patient's volume status, including labs, urine output, blood pressure (other measures as available)  - Encourage oral intake as appropriate  - Instruct patient on fluid and nutrition restrictions as appropriate  Outcome: Progressing     Problem: MUSCULOSKELETAL - ADULT  Goal: Return mobility to safest level of function  Description: INTERVENTIONS:  - Assess patient stability and activity tolerance for standing, transferring and ambulating w/ or w/o assistive devices  - Assist with transfers and ambulation using safe patient handling equipment as needed  - Ensure adequate protection for wounds/incisions during mobilization  - Obtain PT/OT consults as needed  - Advance activity as appropriate  - Communicate ordered activity level and limitations with patient/family  Outcome: Progressing

## 2023-07-20 NOTE — PLAN OF CARE
Patient alert and oriented x4. Tolerating diet, no nausea. Mouth sores present, MD aware. Ambulating with 1 assist and RW. Per family, patient has home walker. Cleared for discharge. Instructions given. Education given. Patient and family agreeable. Addendum @5162: This RN waiting to get clearance from Case Management regarding discharge. Family and patient waiting at bedside. Case management has been attempted to be reached 3x. Cleared by CM for discharge. Problem: Patient Centered Care  Goal: Patient preferences are identified and integrated in the patient's plan of care  Description: Interventions:  - What would you like us to know as we care for you? I have been experiencing balance issues prior to this admission. I will continue workup with neurology outpatient. - Provide timely, complete, and accurate information to patient/family  - Incorporate patient and family knowledge, values, beliefs, and cultural backgrounds into the planning and delivery of care  - Encourage patient/family to participate in care and decision-making at the level they choose  - Honor patient and family perspectives and choices  Outcome: Adequate for Discharge     Problem: SAFETY ADULT - FALL  Goal: Free from fall injury  Description: INTERVENTIONS:  - Assess pt frequently for physical needs  - Identify cognitive and physical deficits and behaviors that affect risk of falls.   - [place] fall precautions as indicated by assessment.  - Educate pt/family on patient safety including physical limitations  - Instruct pt to call for assistance with activity based on assessment  - Modify environment to reduce risk of injury  - Provide assistive devices as appropriate  - Consider OT/PT consult to assist with strengthening/mobility  - Encourage toileting schedule  Outcome: Adequate for Discharge     Problem: METABOLIC/FLUID AND ELECTROLYTES - ADULT  Goal: Hemodynamic stability and optimal renal function maintained  Description: INTERVENTIONS:  - Monitor labs and assess for signs and symptoms of volume excess or deficit  - Monitor intake, output and patient weight  - Monitor urine specific gravity, serum osmolarity and serum sodium as indicated or ordered  - Monitor response to interventions for patient's volume status, including labs, urine output, blood pressure (other measures as available)  - Encourage oral intake as appropriate  - Instruct patient on fluid and nutrition restrictions as appropriate  Outcome: Adequate for Discharge     Problem: MUSCULOSKELETAL - ADULT  Goal: Return mobility to safest level of function  Description: INTERVENTIONS:  - Assess patient stability and activity tolerance for standing, transferring and ambulating w/ or w/o assistive devices  - Assist with transfers and ambulation using safe patient handling equipment as needed  - Ensure adequate protection for wounds/incisions during mobilization  - Obtain PT/OT consults as needed  - Advance activity as appropriate  - Communicate ordered activity level and limitations with patient/family  Outcome: Adequate for Discharge     Problem: Patient/Family Goals  Goal: Patient/Family Long Term Goal  Description: Patient's Long Term Goal: Discharge without complications    Interventions:  - Work with treatment team to develop plan of care  - Utilize medications as appropriate, take prescribed medications as instructed  - Continue following up post discharge as instructed with appropriate physicians   - See additional Care Plan goals for specific interventions  Outcome: Adequate for Discharge  Goal: Patient/Family Short Term Goal  Description: Patient's Short Term Goal: Pain control    Interventions:   - Work with treatment team to develop pain control plan  - Utilize PRN medications appropriately  - Use non-pharmacological pain control techniques (deep breathing, guided-imagery, heat/ice)  - See additional Care Plan goals for specific interventions  Outcome: Adequate for Discharge

## 2023-07-20 NOTE — CM/SW NOTE
CM Met with pt and wife at bedside. Son Aleida Montes on speaker phone. Informed them that ins denied AR but approved JARROD. After discussion pt and spouse do not feel JARROD is needed. They would like pt to dc home with Day Rehab at [facility]. Day Rehab referrals sent via [employer]. CM requested day rehab order from Dr. Sandra Jay. [facility]  P: [phone]  F: [phone]    Pt discussed with Kodi Blankenship, she will work on pts discharge. Plan: Home today, day rehab at [facility] when approved. / to remain available for support and/or discharge planning. Anjelica Erickson.  Pam Romero RN, BSN  Nurse   [phone]

## 2023-07-20 NOTE — PHYSICAL THERAPY NOTE
07/20/23 1142   VISIT TYPE   PT Inpatient Visit Type (Documentation Required) Attempted Treatment     Pt seen sitting in chair, spouse and daughter present. This therapist introduced self and role however daughter declined participation with therapy due to likely DC home today. If pt to remain hospitalized for any reason, will follow up for PT tx at later date as appropriate and able.

## 2023-07-20 NOTE — DISCHARGE SUMMARY
[facility]    Discharge Summary    Melly Forrest Patient Status:  Observation    12/15/1950 MRN [id number]   Location [facility] 4W/SW/SE Attending Yesenia Loomis MD   Hosp Day # 0 PCP Johnny Amato MD     Date of Admission: 7/15/2023      Date of Discharge: 23      Admitting Diagnosis: Weakness generalized [R53.1]  Febrile illness, acute [R50.9]    Hospital Discharge Diagnoses:  Weakness    Lace+ Score: 46  59-90 High Risk  29-58 Medium Risk  0-28   Low Risk. TCM Follow-Up Recommendation:  LACE 29-58: Moderate Risk of readmission after discharge from the hospital.          Problem List: Patient Active Problem List:     Smoker     Psoriasis     Dyslipidemia     Ganglion cyst of wrist, left     Chronic left lumbar radiculopathy     HNP (herniated nucleus pulposus), lumbar     Poor posture     Sleep disturbance     S/P lumbar microdiscectomy     Age-related nuclear cataract of both eyes     Floaters in visual field, bilateral     Visual impairment     Right leg weakness     Lumbar disc herniation with radiculopathy     Eczema     BPH (benign prostatic hyperplasia)     Back problem     Pseudophakia of both eyes     Febrile illness, acute     Weakness generalized     Ataxia        Physical Exam:     Gen: No acute distress  Pulm: Lungs clear, normal respiratory effort  CV: Heart with regular rate and rhythm  Abd: Abdomen soft, nontender, nondistended, bowel sounds present  Neuro: No acute focal deficits  MSK: Full range of motion in extremities  Skin: Warm and dry  Psych: Normal affect  Ext: no c/c/e      History of Present Illness: Per Dr Rip Brown is a 67year old male with hx. Of dementia, eczema, BPH who presents with profound weakness. He notes that he has had dizziness off and on but progressively worse over the past month. Yesterday he was walking and he fell when his shoe got caught on the carpet.   Very hard time getting up he was able to get up with help from his wife and mated to bed. He slept but then when he woke up he was unable to get out of bed. Aside from dizziness he denies pain he denies dysuria he denies cough. He did note that his wife felt he was febrile on checking his forehead. The only other complaint he has is he has had a sore throat since yesterday. Hospital Course:     Fever  Weakness  -source unclear  -does have sore throat and erythema on exam  -checked rapid strep - negative  -started augmentin - changed to amoxicillin  -monitor fever curver  -follow-up blood cultures - ngtd  -cxr and ua unremarkable  -urine culture --> 10-50K staph aureus  -complete 7 days of amoxicillin  -pt/ot--> acute rehab denied - plan home with outpt pt  -up in chair tid with meals     Nonpharmacological treatment for orthostatic hypotension:  Liberalization of water intake, up to 2.5 L/D. Recommend bolus water drinking (500 mL or 16 ounces) to produce a marked increase in blood pressure. Bolus water drinking has a fast pressor effect and can increase the blood pressure within 5 to 10 minutes. This only lasts for 30 to 45 minutes. Physical activity with recumbent exercises for instance a stationary bike, rowing machine, or in a swimming pool. Sleeping with the head of the bed raised to 30 to 45 degrees  Can consider an abdominal binder but these are cumbersome and difficult to use. Can also consider a waist high compression stocking that produces at least 15mmHg to 20 mmHg of pressure. Knee-high or thigh-high stockings are not useful. Again these can be very cumbersome and difficult to use.   If the patient has anemia of chronic disease then recommend treatment with EPO  Pharmacological measures include removing any aggravating medications including nitrates, tricyclic antidepressants, diuretics, calcium channel blockers, alpha blockers, phosphodiesterase 5 inhibitors, centrally acting alpha 2 agonists (clonidine or tizanidine) and beta-blockers. If nonpharmacological measures do not prevent orthostasis, can consider adding midodrine, droxidopa, fludrocortisone or pyridostigmine.  -added low dose midodrine     Progressive ataxia  -MRI brain was done in 2021-symptoms are worsening  Possibility of MSA versus PSP   -Rule out spine involvement check cervical and thoracic spine MRI - negative  -follow-up mri cervical spine - no acute process     VELIA  -baseline creatinine ~1--> 1.2  -now  1.44 on admission  -cont ivf  -repdat labs in am  -Creatinine improved to 1.15     Hyponatremia  -mild, monitor     Oral Ulcers  -check hsv 1/2 pcr  -start valtrex empirically  -magic mouthwash for comfort     Hx. Of Erythroderma  BPH  Hx. of pulmonary hypertension  Hx. Tobacco aubse    Discharge Condition: Stable    Discharge Medications:      Discharge Medications        START taking these medications        Instructions Prescription details   acetaminophen 500 MG Tabs  Commonly known as: Tylenol Extra Strength      Take 1 tablet (500 mg total) by mouth every 4 (four) hours as needed for Fever (equal to or greater than 100.4). Refills: 0     amoxicillin 500 MG Caps  Commonly known as: Amoxil      Take 1 capsule (500 mg total) by mouth every 8 (eight) hours for 3 days. Stop taking on: July 21, 2023  Quantity: 9 capsule  Refills: 0     benzonatate 200 MG Caps  Commonly known as: Tessalon      Take 1 capsule (200 mg total) by mouth 3 (three) times daily as needed for cough. Refills: 0     bisacodyl 10 MG Supp  Commonly known as: Dulcolax      Place 1 suppository (10 mg total) rectally daily as needed. Refills: 0     ipratropium-albuterol 0.5-2.5 (3) MG/3ML Soln  Commonly known as: Duoneb      Take 3 mL by nebulization every 6 (six) hours as needed.    Refills: 0     midodrine 2.5 MG Tabs  Commonly known as: ProAmatine  Notes to patient: 0600, 1200, 1800      Take 1 tablet (2.5 mg total) by mouth in the morning and 1 tablet (2.5 mg total) at noon and 1 tablet (2.5 mg total) in the evening. Refills: 0     Sennosides 17.2 MG Tabs      Take 1 tablet (17.2 mg total) by mouth nightly as needed (constipation, as needed if no bowel movement that day). Refills: 0     Upadacitinib ER 15 MG Tb24      Take 15 mg by mouth daily. Quantity: 30 tablet  Refills: 0     valACYclovir 1 G Tabs  Commonly known as: Valtrex      Take 1 tablet (1,000 mg total) by mouth every 12 (twelve) hours for 7 days. Stop taking on: July 25, 2023  Quantity: 14 tablet  Refills: 0            CONTINUE taking these medications        Instructions Prescription details   buPROPion  MG Tb12  Commonly known as: WELLBUTRIN SR      Take 1 tablet (150 mg total) by mouth daily. Refills: 0     CeraVe Crea      Apply topically daily. Refills: 0     donepezil 5 MG Tabs  Commonly known as: Aricept      Take 1 tablet (5 mg total) by mouth nightly. Refills: 0     FLUTICASONE PROPIONATE EX      Apply topically as needed. Refills: 0     folic acid 1 MG Tabs  Commonly known as: Folvite       Refills: 0     Magnesium Oxide Powd      Take by mouth daily. Refills: 0     melatonin 3 MG Tabs      Take 2 tablets (6 mg total) by mouth daily. Refills: 0     memantine 5 MG Tabs  Commonly known as: Namenda      Take 1 tablet (5 mg total) by mouth daily. Stop taking on: August 16, 2023  Quantity: 90 tablet  Refills: 0     pimecrolimus 1 % Crea  Commonly known as: Elidel      Apply 1 Application topically 2 (two) times a day. Refills: 0     Roflumilast 500 MCG Tabs      Take 1 tablet by mouth As Directed.    Refills: 0     tamsulosin 0.4 MG Caps  Commonly known as: Flomax      TAKE 30 MINUTES AFTER A MEAL AT THE SAME TIME EVERY DAY   Refills: 0            STOP taking these medications      cephalexin 500 MG Caps  Commonly known as: Keflex        Otezla 30 MG Tabs  Generic drug: Apremilast        Stelara 45 MG/0.5ML Sosy  Generic drug: Ustekinumab                  Where to Get Your Medications        [id number] [place] [facility] [address] [facility], [phone], [address] [place] [address] [place] [address]      Hours: 24-hours Phone: [phone]   amoxicillin 500 MG Caps  Upadacitinib ER 15 MG Tb24  valACYclovir 1 G Tabs             Singh Mccrary MD  7/20/2023  12:11 PM    Greater than 30 minutes spent on preparation and coordination of this discharge

## 2023-07-20 NOTE — OCCUPATIONAL THERAPY NOTE
07/20/23 1140   VISIT TYPE   OT Inpatient Visit Type (Documentation Required) Attempted Treatment  (Pt's daughter declined, stating: \"Why are you trying top see him when he's trying to get discharged? \")   OT Follow Up   Charge Other (Comment)  (Missed visit)   Follow Up Needed (Documentation Required) No   OT Follow-up Date   (N/A, pt is in th eprocess of discharge home at this time.)         Sandy Clark, OTR/L  [address]

## 2023-07-20 NOTE — CM/SW NOTE
ARTHUR notified by Zaira Rubin liaison that pts insurance denied AR but approved JARROD. CM requested department  [facility]) to initiate AIDIN referral for JARROD. KAYLA/ARTHUR will continue to follow. Joaquin Crystal.  Marlene Patiño RN, BSN  Nurse   [phone]

## 2023-07-20 NOTE — CM/SW NOTE
07/20/23 1100   Discharge disposition   Expected discharge disposition Home or Self   Outpatient services Day rehab  ([facility])   Discharge transportation Private car     Day rehab order entered and co-signed by Dr. Monica Spencer. Order attached to Aidin referral to [facility] day rehab. Call placed to intake at [facility] day rehab. LM with admissions to inform them of referral and to contact pts wife to begin care. Requested a call back to confirm receipt. Plan: Home today, begin outpt Day Rehab when arranged by JOSE Hazel.  Adithya Gayle RN, BSN  Nurse   [phone]

## 2023-07-22 LAB
VIT E ALPHA TOCO: 7.5 MG/L
VIT E GAMMA TOCO: 0.7 MG/L

## 2023-07-26 LAB — ANTI-HNA ANTIBODIES: NEGATIVE

## 2023-07-27 LAB
ACHR BINDING ABS: <0.03 NMOL/L
ACHR BLOCKING ABS: 15 %
ACHR-MODULATING AB: 10 %
MUSK ABS, SERUM: <1 U/ML
STRIATIONAL ABS, SERUM: NEGATIVE

## 2023-07-28 ENCOUNTER — TELEPHONE (OUTPATIENT)
Dept: NEUROLOGY | Facility: CLINIC | Age: 73
End: 2023-07-28

## 2023-07-28 NOTE — TELEPHONE ENCOUNTER
----- Message from Shanae Torres MD sent at 7/28/2023  8:29 AM CDT -----  Please let the patient know that results of these particular lab tests so far were normal.    Thank you

## 2023-08-10 ENCOUNTER — OFFICE VISIT (OUTPATIENT)
Dept: NEUROLOGY | Facility: CLINIC | Age: 73
End: 2023-08-10
Payer: MEDICARE

## 2023-08-10 VITALS — HEIGHT: 70 IN | BODY MASS INDEX: 27.63 KG/M2 | WEIGHT: 193 LBS

## 2023-08-10 DIAGNOSIS — R41.3 MEMORY PROBLEM: Primary | ICD-10-CM

## 2023-08-10 DIAGNOSIS — D47.2 MGUS (MONOCLONAL GAMMOPATHY OF UNKNOWN SIGNIFICANCE): ICD-10-CM

## 2023-08-10 DIAGNOSIS — M47.812 CERVICAL SPONDYLOSIS: ICD-10-CM

## 2023-08-10 DIAGNOSIS — G62.9 LENGTH-DEPENDENT PERIPHERAL NEUROPATHY: ICD-10-CM

## 2023-08-10 DIAGNOSIS — I95.1 ORTHOSTATIC HYPOTENSION: ICD-10-CM

## 2023-08-10 DIAGNOSIS — E56.0 VITAMIN E DEFICIENCY: ICD-10-CM

## 2023-08-10 DIAGNOSIS — E53.8 VITAMIN B12 DEFICIENCY: ICD-10-CM

## 2023-08-10 PROCEDURE — 99214 OFFICE O/P EST MOD 30 MIN: CPT | Performed by: OTHER

## 2023-08-10 PROCEDURE — 3008F BODY MASS INDEX DOCD: CPT | Performed by: OTHER

## 2023-08-10 PROCEDURE — 1111F DSCHRG MED/CURRENT MED MERGE: CPT | Performed by: OTHER

## 2023-08-10 PROCEDURE — 1159F MED LIST DOCD IN RCRD: CPT | Performed by: OTHER

## 2023-08-10 PROCEDURE — 1160F RVW MEDS BY RX/DR IN RCRD: CPT | Performed by: OTHER

## 2023-08-10 RX ORDER — MELATONIN
500 DAILY
COMMUNITY
Start: 2022-11-01

## 2023-08-10 RX ORDER — DONEPEZIL HYDROCHLORIDE 10 MG/1
10 TABLET, FILM COATED ORAL NIGHTLY
Qty: 90 TABLET | Refills: 3 | Status: SHIPPED | OUTPATIENT
Start: 2023-08-10 | End: 2024-08-04

## 2023-08-10 RX ORDER — MEMANTINE HYDROCHLORIDE 5 MG/1
5 TABLET ORAL DAILY
Qty: 90 TABLET | Refills: 3 | Status: SHIPPED | OUTPATIENT
Start: 2023-08-10 | End: 2024-08-04

## 2023-08-10 NOTE — PATIENT INSTRUCTIONS
-Follow up in 3 months or sooner if needed. -If you develop sudden onset loss of vision, double vision, room spinning/world spinning sensation, inability to speak or understand others who are speaking to you, slurred speech, balance problems, weakness on one side of your body, numbness on one side of your body or worst headache you ever had, please seek out emergency medical attention via 911 for the nearest emergency room to be evaluated for possible stroke. -MyChart or call office with any questions or concerns. Thank you for coming to see me today. The easiest way to communicate with me is via "[employer]hart. Please ask the schedulers to give you an activation code. You can also find the activation code on the lower right hand side of the first page of your after visit summary. The main way of communication is by "[employer]hart rather than phone lines, so if you have not signed up, I recommend for you to do so. "[employer]hart is also the way that you can review your labs and testing. If you do not hear back from us regarding testing you have had, it should be considered normal or within normal range. If you have any questions about the results, you are free to message us. We will communicate via phone call for testing updates if you are not signed up for "[employer]hart. [employer] is meant for simple questions regarding medications, possible side effects, or other simple straight forward questions in limited sentences, rather than multiple paragraphs of discussion. Vyu is not meant for, or efficient for these complex questions, extensive questions, extensive medication adjustments, complex new symptoms or concerns. These issues beyond simple questions require a follow up visit with myself. Refills:  Please pay attention to when your refills will need to be renewed.  Due to the volume of phone calls daily, this could potentially take a few days, although we certainly try to honor your refill requests as soon as we can.  You should call at least 1 week in advance of needing a refill to ensure you do not run out of medication. Keep in mind that refill requests on Fridays may not be filled until the following week. Nonpharmacological Recommendations for Orthostatic Hypotension/ Orthostatic Intolerance:    1. Make all postural changes from lying to sitting or sitting to standing, slowly. 2. Drink 2.0-2.5 L of fluid per day (if okay with your other doctors). 3. Increase sodium in the diet to 3-5 g per day (if okay with your other doctors). 4. Avoid large meals which can cause low blood pressure during digestion. It is better to eat smaller meals more often than 3 large meals. 5. Avoid alcohol. Alcohol can cause blood to pool in the legs which may worsen low blood pressure reactions when standing. 6. Perform lower extremity exercises to improve strength of the leg muscles. This will help prevent blood from pooling in the legs when standing and walking. 7. Raise the head of the bed by 6-10 inches. The entire bed must be at an angle. Raising only the head portion of the bed at the waist level or using pillows will not be effective. Raising the head of the bed will reduce urine formation overnight and there will be more volume in the circulation in the morning. It may also help orthostatic tolerance during the day. 8. During bad days or prior to engaging in more physical activity than usual, drink 500 mL of water quickly. This will result in increased blood pressure within 5 minutes of drinking the water. The effect will last up to a few hours and may improve orthostatic intolerance. 9. Use custom-fitted elastic support stockings. This will reduce a tendency for blood to pool in the legs when standing and may improve orthostatic intolerance. Abdominal binder or \"SPANX\" may also be useful. 10. Use physical counter-maneuvers such as leg crossing, squatting, or raising and resting the leg on a chair.  These maneuvers increase blood pressure and can improve orthostatic intolerance.   11. Gently escalated aerobic physical activity program for graded reconditioning

## 2023-08-23 ENCOUNTER — LAB ENCOUNTER (OUTPATIENT)
Dept: LAB | Facility: HOSPITAL | Age: 73
End: 2023-08-23
Attending: STUDENT IN AN ORGANIZED HEALTH CARE EDUCATION/TRAINING PROGRAM
Payer: MEDICARE

## 2023-08-23 ENCOUNTER — OFFICE VISIT (OUTPATIENT)
Dept: HEMATOLOGY/ONCOLOGY | Facility: HOSPITAL | Age: 73
End: 2023-08-23
Attending: Other
Payer: MEDICARE

## 2023-08-23 VITALS
TEMPERATURE: 98 F | DIASTOLIC BLOOD PRESSURE: 63 MMHG | SYSTOLIC BLOOD PRESSURE: 121 MMHG | RESPIRATION RATE: 20 BRPM | WEIGHT: 195.19 LBS | BODY MASS INDEX: 28 KG/M2 | OXYGEN SATURATION: 96 % | HEART RATE: 75 BPM

## 2023-08-23 DIAGNOSIS — D47.2 MGUS (MONOCLONAL GAMMOPATHY OF UNKNOWN SIGNIFICANCE): ICD-10-CM

## 2023-08-23 DIAGNOSIS — G62.9 NEUROPATHY: ICD-10-CM

## 2023-08-23 DIAGNOSIS — D47.2 MGUS (MONOCLONAL GAMMOPATHY OF UNKNOWN SIGNIFICANCE): Primary | ICD-10-CM

## 2023-08-23 LAB
ALBUMIN SERPL-MCNC: 3.7 G/DL (ref 3.4–5)
ALBUMIN/GLOB SERPL: 1 {RATIO} (ref 1–2)
ALP LIVER SERPL-CCNC: 86 U/L
ALT SERPL-CCNC: 30 U/L
ANION GAP SERPL CALC-SCNC: 5 MMOL/L (ref 0–18)
AST SERPL-CCNC: 23 U/L (ref 15–37)
BASOPHILS # BLD AUTO: 0.04 X10(3) UL (ref 0–0.2)
BASOPHILS NFR BLD AUTO: 0.8 %
BILIRUB SERPL-MCNC: 0.3 MG/DL (ref 0.1–2)
BUN BLD-MCNC: 20 MG/DL (ref 7–18)
BUN/CREAT SERPL: 14.1 (ref 10–20)
CALCIUM BLD-MCNC: 9.1 MG/DL (ref 8.5–10.1)
CHLORIDE SERPL-SCNC: 109 MMOL/L (ref 98–112)
CO2 SERPL-SCNC: 27 MMOL/L (ref 21–32)
CREAT BLD-MCNC: 1.42 MG/DL
DEPRECATED RDW RBC AUTO: 50.4 FL (ref 35.1–46.3)
EGFRCR SERPLBLD CKD-EPI 2021: 52 ML/MIN/1.73M2 (ref 60–?)
EOSINOPHIL # BLD AUTO: 0.12 X10(3) UL (ref 0–0.7)
EOSINOPHIL NFR BLD AUTO: 2.3 %
ERYTHROCYTE [DISTWIDTH] IN BLOOD BY AUTOMATED COUNT: 14 % (ref 11–15)
FASTING STATUS PATIENT QL REPORTED: NO
GLOBULIN PLAS-MCNC: 3.6 G/DL (ref 2.8–4.4)
GLUCOSE BLD-MCNC: 83 MG/DL (ref 70–99)
HCT VFR BLD AUTO: 40.8 %
HGB BLD-MCNC: 13.5 G/DL
IGA SERPL-MCNC: 185 MG/DL (ref 70–312)
IGM SERPL-MCNC: 119 MG/DL (ref 43–279)
IMM GRANULOCYTES # BLD AUTO: 0.02 X10(3) UL (ref 0–1)
IMM GRANULOCYTES NFR BLD: 0.4 %
IMMUNOGLOBULIN PNL SER-MCNC: 1400 MG/DL (ref 791–1643)
LYMPHOCYTES # BLD AUTO: 1.48 X10(3) UL (ref 1–4)
LYMPHOCYTES NFR BLD AUTO: 28.8 %
MCH RBC QN AUTO: 32.4 PG (ref 26–34)
MCHC RBC AUTO-ENTMCNC: 33.1 G/DL (ref 31–37)
MCV RBC AUTO: 97.8 FL
MONOCYTES # BLD AUTO: 0.58 X10(3) UL (ref 0.1–1)
MONOCYTES NFR BLD AUTO: 11.3 %
NEUTROPHILS # BLD AUTO: 2.89 X10 (3) UL (ref 1.5–7.7)
NEUTROPHILS # BLD AUTO: 2.89 X10(3) UL (ref 1.5–7.7)
NEUTROPHILS NFR BLD AUTO: 56.4 %
OSMOLALITY SERPL CALC.SUM OF ELEC: 294 MOSM/KG (ref 275–295)
PLATELET # BLD AUTO: 289 10(3)UL (ref 150–450)
POTASSIUM SERPL-SCNC: 4.1 MMOL/L (ref 3.5–5.1)
PROT SERPL-MCNC: 7.3 G/DL (ref 6.4–8.2)
RBC # BLD AUTO: 4.17 X10(6)UL
SODIUM SERPL-SCNC: 141 MMOL/L (ref 136–145)
WBC # BLD AUTO: 5.1 X10(3) UL (ref 4–11)

## 2023-08-23 PROCEDURE — 85060 BLOOD SMEAR INTERPRETATION: CPT

## 2023-08-23 PROCEDURE — 86334 IMMUNOFIX E-PHORESIS SERUM: CPT

## 2023-08-23 PROCEDURE — 82232 ASSAY OF BETA-2 PROTEIN: CPT

## 2023-08-23 PROCEDURE — 82784 ASSAY IGA/IGD/IGG/IGM EACH: CPT

## 2023-08-23 PROCEDURE — 85025 COMPLETE CBC W/AUTO DIFF WBC: CPT

## 2023-08-23 PROCEDURE — 36415 COLL VENOUS BLD VENIPUNCTURE: CPT

## 2023-08-23 PROCEDURE — 99204 OFFICE O/P NEW MOD 45 MIN: CPT | Performed by: STUDENT IN AN ORGANIZED HEALTH CARE EDUCATION/TRAINING PROGRAM

## 2023-08-23 PROCEDURE — 80053 COMPREHEN METABOLIC PANEL: CPT

## 2023-08-23 PROCEDURE — 84165 PROTEIN E-PHORESIS SERUM: CPT

## 2023-08-23 PROCEDURE — 83521 IG LIGHT CHAINS FREE EACH: CPT

## 2023-08-24 LAB
B2 MICROGLOB SERPL-MCNC: 0.2 MG/DL (ref 0.11–0.25)
KAPPA LC FREE SER-MCNC: 2.64 MG/DL (ref 0.33–1.94)
KAPPA LC FREE/LAMBDA FREE SER NEPH: 0.96 {RATIO} (ref 0.26–1.65)
LAMBDA LC FREE SERPL-MCNC: 2.76 MG/DL (ref 0.57–2.63)

## 2023-08-24 PROCEDURE — 86335 IMMUNFIX E-PHORSIS/URINE/CSF: CPT

## 2023-08-24 PROCEDURE — 84166 PROTEIN E-PHORESIS/URINE/CSF: CPT

## 2023-08-24 PROCEDURE — 84156 ASSAY OF PROTEIN URINE: CPT

## 2023-08-25 ENCOUNTER — LAB ENCOUNTER (OUTPATIENT)
Dept: LAB | Facility: HOSPITAL | Age: 73
End: 2023-08-25
Attending: STUDENT IN AN ORGANIZED HEALTH CARE EDUCATION/TRAINING PROGRAM
Payer: MEDICARE

## 2023-08-25 DIAGNOSIS — D47.2 MGUS (MONOCLONAL GAMMOPATHY OF UNKNOWN SIGNIFICANCE): ICD-10-CM

## 2023-08-25 LAB
ALBUMIN SERPL ELPH-MCNC: 4.1 G/DL (ref 3.75–5.21)
ALBUMIN/GLOB SERPL: 1.32 {RATIO} (ref 1–2)
ALPHA1 GLOB SERPL ELPH-MCNC: 0.27 G/DL (ref 0.19–0.46)
ALPHA2 GLOB SERPL ELPH-MCNC: 0.67 G/DL (ref 0.48–1.05)
B-GLOBULIN SERPL ELPH-MCNC: 1.59 G/DL (ref 0.68–1.23)
GAMMA GLOB SERPL ELPH-MCNC: 0.58 G/DL (ref 0.62–1.7)
M PROTEIN 24H UR ELPH-MRATE: 114.8 MG/24 HR (ref ?–149.1)
PROT SERPL-MCNC: 7.2 G/DL (ref 6.4–8.2)
SPECIMEN VOL UR: 700 ML

## 2023-08-28 ENCOUNTER — PATIENT MESSAGE (OUTPATIENT)
Dept: NEUROLOGY | Facility: CLINIC | Age: 73
End: 2023-08-28

## 2023-08-28 NOTE — TELEPHONE ENCOUNTER
From: Priscilla Perez  To: Mana Allison DO  Sent: 8/28/2023 7:28 AM CDT  Subject: Vitamin E presciption    Dr. June Wright, our pharmacy is having difficulty in getting the 100 iu Vitamin E you prescribed. I have checked [address] and other pharmacy and am unable to find it. Can you provide a more specific dosage such as mg count in order to find this medication? Thank you.     Ailyn Sánchez

## 2023-09-01 ENCOUNTER — OFFICE VISIT (OUTPATIENT)
Dept: HEMATOLOGY/ONCOLOGY | Facility: HOSPITAL | Age: 73
End: 2023-09-01
Attending: Other
Payer: MEDICARE

## 2023-09-01 VITALS
RESPIRATION RATE: 20 BRPM | OXYGEN SATURATION: 96 % | HEART RATE: 72 BPM | DIASTOLIC BLOOD PRESSURE: 45 MMHG | BODY MASS INDEX: 28 KG/M2 | TEMPERATURE: 98 F | SYSTOLIC BLOOD PRESSURE: 101 MMHG | WEIGHT: 194.81 LBS

## 2023-09-01 DIAGNOSIS — D47.2 MGUS (MONOCLONAL GAMMOPATHY OF UNKNOWN SIGNIFICANCE): Primary | ICD-10-CM

## 2023-09-01 PROCEDURE — 99214 OFFICE O/P EST MOD 30 MIN: CPT | Performed by: STUDENT IN AN ORGANIZED HEALTH CARE EDUCATION/TRAINING PROGRAM

## 2023-11-24 ENCOUNTER — LAB ENCOUNTER (OUTPATIENT)
Dept: LAB | Facility: HOSPITAL | Age: 73
End: 2023-11-24
Attending: Other
Payer: MEDICARE

## 2023-11-24 DIAGNOSIS — E56.0 VITAMIN E DEFICIENCY: ICD-10-CM

## 2023-11-24 LAB
ANION GAP SERPL CALC-SCNC: 5 MMOL/L (ref 0–18)
BUN BLD-MCNC: 22 MG/DL (ref 9–23)
BUN/CREAT SERPL: 16.9 (ref 10–20)
CALCIUM BLD-MCNC: 9.4 MG/DL (ref 8.7–10.4)
CHLORIDE SERPL-SCNC: 110 MMOL/L (ref 98–112)
CO2 SERPL-SCNC: 27 MMOL/L (ref 21–32)
CREAT BLD-MCNC: 1.3 MG/DL
EGFRCR SERPLBLD CKD-EPI 2021: 58 ML/MIN/1.73M2 (ref 60–?)
FASTING STATUS PATIENT QL REPORTED: YES
GLUCOSE BLD-MCNC: 88 MG/DL (ref 70–99)
MAGNESIUM SERPL-MCNC: 2.1 MG/DL (ref 1.6–2.6)
OSMOLALITY SERPL CALC.SUM OF ELEC: 297 MOSM/KG (ref 275–295)
POTASSIUM SERPL-SCNC: 4.1 MMOL/L (ref 3.5–5.1)
SODIUM SERPL-SCNC: 142 MMOL/L (ref 136–145)

## 2023-11-24 PROCEDURE — 80048 BASIC METABOLIC PNL TOTAL CA: CPT

## 2023-11-24 PROCEDURE — 83735 ASSAY OF MAGNESIUM: CPT

## 2023-11-24 PROCEDURE — 36415 COLL VENOUS BLD VENIPUNCTURE: CPT

## 2023-11-24 PROCEDURE — 84446 ASSAY OF VITAMIN E: CPT

## 2023-11-28 LAB
VIT E ALPHA TOCO: 8 MG/L
VIT E GAMMA TOCO: 0.6 MG/L

## 2024-01-24 NOTE — H&P
HPI:   Ben Key is a 73 year old male who presents for a Medicare Subsequent Annual Wellness visit (Pt already had Initial Annual Wellness).    Ben is feeling fair today. Has some concerns.    Sleeping he has been waking up soon after he gets to sleep. 2-3 times nocturia. Melatonin has helped at 5 mg. Side effects with nightmares.     Dizziness worse with getting up and standing up. Has been diagnosed with orthostatic hypotension within 1 year. May not get dizzy in a whole week. He does have neuropathy. Affected in the bottom feet. Not driving right now. More than 1 year since last fall. He did have low blood pressure at that time.     Urination, he goes but does not have the volume    He is due for colonoscopy    He does have some memory changes with names, puzzles, certain words. Processing speeds. He did complete neuropsychiatric testing. Ирина notes that he forgets how to use TV remote. He used to be more mechanically inclined. He has kept the faucet on one time.    He recalls an episode of difficulty with functioning and it took a month to recover with his functionality.     I reviewed and updated the PMHx, FamHx, medications, allergies, and SocHx as below with the patient    SocHX  - Home: Feels safe at home; wife  - Work: Feels safe at work; retired mechanical engineering x 44-45   - Hobbies: help kids build/design things. Repairing condo, watching sports.  - Nutrition: the best foods - salmon, fish, beef, pork; veggies, salads, deli meat/turkey. Cookies. Drinking pepsi not as much water, lemonade.  - Physical Activity: not very active. Walking, going to the park      No topic due editable text        Fall Risk Assessment:   He has been screened for Falls and is High Risk. Fall Prevention information provided to patient in After Visit Summary.    Do you feel unsteady when standing or walking?: Yes  Do you worry about falling?: No  Have you fallen in the past year?: Yes  How many times have you  fallen?: 2  Were you injured?: No   Cognitive Assessment:   He had a completely normal cognitive assessment - see flowsheet entries     Functional Ability/Status:   Ben Key has some abnormal functions as listed below:  He has Driving difficulties based on screening of functional status. He has difficulties Managing Money/Bills based on screening of functional status.He has difficulties Taking Meds as Rx'd based on screening of functional status. He has Hearing problems based on screening of functional status.He has problems with Daily Activities based on screening of functional status. He has problems with Memory based on screening of functional status.       Depression Screening (PHQ-2/PHQ-9): Over the LAST 2 WEEKS   Little interest or pleasure in doing things: Not at all  Feeling down, depressed, or hopeless: Several days  PHQ-2 SCORE: 1      Advanced Directive:  He does NOT have a Living Will. [Do you have a living will?: Yes]  He has a Power of  for Health Care on file in [facility].    Tobacco:  He smoked tobacco in the past but quit greater than 12 months ago.  Social History    Tobacco Use      Smoking status: Former        Packs/day: 1.00        Years: 40.00        Additional pack years: 0.00        Total pack years: 40.00        Types: Cigarettes        Quit date: 2021        Years since quitting: 3.0      Smokeless tobacco: Never         CAGE Alcohol Screen:   CAGE screening score of 0 on 1/25/2024, showing low risk of alcohol abuse.       Patient Care Team: Patient Care Team:  Gabriel Rodríguez MD as PCP - General (Internal Medicine)  Cornelius Kim PA as Consulting Physician (Physician Assistant)  Rodrigue Hernandez MD as Consulting Physician (NEUROSURGERY)  Jenelle Dougherty PA-C as Consulting Physician (Physician Assistant)    Patient Active Problem List   Diagnosis    Smoker    Psoriasis    Dyslipidemia    Ganglion cyst of wrist, left    Chronic left lumbar radiculopathy    HNP  (herniated nucleus pulposus), lumbar    Poor posture    Sleep disturbance    S/P lumbar microdiscectomy    Age-related nuclear cataract of both eyes    Floaters in visual field, bilateral    Visual impairment    Right leg weakness    Lumbar disc herniation with radiculopathy    Eczema    BPH (benign prostatic hyperplasia)    Back problem    Pseudophakia of both eyes    Febrile illness, acute    Weakness generalized    Ataxia     Wt Readings from Last 3 Encounters:   01/25/24 191 lb 12.8 oz (87 kg)   09/01/23 194 lb 12.8 oz (88.4 kg)   08/23/23 195 lb 3.2 oz (88.5 kg)      Last Cholesterol Labs:   Lab Results   Component Value Date    CHOLEST 186 03/15/2018    HDL 31 03/15/2018     (H) 03/15/2018    TRIG 136 03/15/2018          Last Chemistry Labs:   Lab Results   Component Value Date    AST 23 08/23/2023    ALT 30 08/23/2023    CA 9.4 11/24/2023    ALB 3.7 08/23/2023    ALB 4.10 08/23/2023    TSH 1.620 07/16/2023    CREATSERUM 1.30 11/24/2023    GLU 88 11/24/2023        CBC  (most recent labs)   Lab Results   Component Value Date    WBC 5.1 08/23/2023    HGB 13.5 08/23/2023    .0 08/23/2023        ALLERGIES:   He is allergic to dupixent [dupilumab], mildew, and mold.    CURRENT MEDICATIONS:   Outpatient Medications Marked as Taking for the 1/25/24 encounter (Office Visit) with Gabriel Rodríguez MD   Medication Sig    melatonin 5 MG Oral Cap Take 1 capsule (5 mg total) by mouth nightly.    midodrine 5 MG Oral Tab Take 1 tablet (5 mg total) by mouth in the morning and 1 tablet (5 mg total) at noon and 1 tablet (5 mg total) in the evening.    vitamin E 100 UNITS Oral Cap Take 1 capsule (100 Units total) by mouth daily.    Cyanocobalamin (VITAMIN B12) 500 MCG Oral Tab Take 500 mcg by mouth daily.    donepezil 10 MG Oral Tab Take 1 tablet (10 mg total) by mouth nightly.    memantine 5 MG Oral Tab Take 1 tablet (5 mg total) by mouth daily.    acetaminophen 500 MG Oral Tab Take 1 tablet (500 mg total) by mouth  every 4 (four) hours as needed for Fever (equal to or greater than 100.4).    sennosides 17.2 MG Oral Tab Take 1 tablet (17.2 mg total) by mouth nightly as needed (constipation, as needed if no bowel movement that day).    tamsulosin (FLOMAX) cap TAKE 30 MINUTES AFTER A MEAL AT THE SAME TIME EVERY DAY    Emollient (CERAVE) External Cream Apply topically daily.      MEDICAL INFORMATION:   He  has a past medical history of Back problem, BPH (benign prostatic hyperplasia), Eczema, Hypotension, Lumbar disc herniation with radiculopathy, Right leg weakness, and Visual impairment.    He  has a past surgical history that includes cholecystectomy; colonoscopy; spine surgery procedure unlisted (02/15/2021); other (11/2021); other (10/2021); Cataract extraction w/ intraocular lens implant (Left, 11/2021); Cataract extraction w/  intraocular lens implant (Left, 11/16/2021); and Cataract extraction w/  intraocular lens implant (Right, 10/19/2021).    His family history includes Dementia in his mother; Glaucoma in his mother; Heart Disorder in his father.   SOCIAL HISTORY:   He  reports that he quit smoking about 3 years ago. His smoking use included cigarettes. He has a 40 pack-year smoking history. He has never used smokeless tobacco. He reports that he does not currently use alcohol. He reports that he does not use drugs.     REVIEW OF SYSTEMS:   GENERAL: feels well otherwise  SKIN: denies any unusual skin lesions  EYES: denies blurred vision or double vision  HEENT: denies nasal congestion, sinus pain or ST  LUNGS: denies shortness of breath with exertion  CARDIOVASCULAR: denies chest pain on exertion  GI: denies abdominal pain, denies heartburn  : 1-2 per night nocturia, no complaint of urinary incontinence  MUSCULOSKELETAL: denies back pain  NEURO: denies headaches; memory changes, dizziness  PSYCHE: denies depression or anxiety  HEMATOLOGIC: denies hx of anemia  ENDOCRINE: denies thyroid history  ALL/ASTHMA: denies hx  of allergy or asthma    EXAM:   BP 92/40   Pulse 76   Temp 97.7 °F (36.5 °C)   Ht 5' 10\" (1.778 m)   Wt 191 lb 12.8 oz (87 kg)   SpO2 97%   BMI 27.52 kg/m²   Estimated body mass index is 27.52 kg/m² as calculated from the following:    Height as of this encounter: 5' 10\" (1.778 m).    Weight as of this encounter: 191 lb 12.8 oz (87 kg).    Medicare Hearing Assessment  (Required for AWV/SWV)    Hearing Screening    Screening Method: Questionnaire  I have a problem hearing over the telephone: No I have trouble following the conversations when two or more people are talking at the same time: No   I have trouble understanding things on the TV: Yes I have to strain to understand conversations: No   I have to worry about missing the telephone ring or doorbell: No I have trouble hearing conversations in a noisy background such as a crowded room or restaurant: Yes   I get confused about where sounds come from: No I misunderstand some words in a sentence and need to ask people to repeat themselves: No   I especially have trouble understanding the speech of women and children: No I have trouble understanding the speaker in a large room such as at a meeting or place of [employer]: No   Many people I talk to seem to mumble (or don't speak clearly): No    I misunderstand what others are saying and make inappropriate responses: No I avoid social activities because I cannot hear well and fear I will reply improperly: No   Family members and friends have told me they think I may have hearing loss: Yes                    Visual Acuity                           General Appearance:  Alert, cooperative, no distress, appears stated age   Head:  Normocephalic, without obvious abnormality, atraumatic   Eyes:  PERRL, conjunctiva/corneas clear, EOM's intact, both eyes   Ears:  Normal TM's and external ear canals, both ears   Nose: Nares normal, septum midline, mucosa normal, no drainage or sinus tenderness   Throat: Lips, mucosa, and  tongue normal; teeth and gums normal   Neck: Supple, symmetrical, trachea midline, no adenopathy, thyroid: not enlarged, symmetric, no tenderness/mass/nodules, no carotid bruit or JVD   Back:   Symmetric, no curvature, ROM normal, no CVA tenderness   Lungs:   Clear to auscultation bilaterally, respirations unlabored   Chest Wall:  No tenderness or deformity   Heart:  Regular rate and rhythm, S1, S2 normal, no murmur, rub or gallop   Abdomen:   Soft, non-tender, bowel sounds active all four quadrants,  no masses, no organomegaly   Genitalia: Normal male   Rectal: Normal tone, +BPH, no masses or tenderness   Extremities: Extremities normal, atraumatic, no cyanosis or edema   Pulses: 2+ and symmetric   Skin: Skin color, texture, turgor normal, no rashes or lesions   Lymph nodes: Cervical, supraclavicular, and axillary nodes normal   Neurologic: Normal, CN II through XII intact, 5 out of 5 muscle strength throughout, 2+ DTRs patellar tendons, normal gait        Vaccination History     Immunization History   Administered Date(s) Administered    Covid-19 Vaccine Pfizer 30 mcg/0.3 ml 03/05/2021, 03/23/2021, 09/27/2021    Covid-19 Vaccine Pfizer Bivalent 30mcg/0.3mL 09/13/2022    Covid-19 Vaccine Pfizer Jose Juan-Sucrose 30 mcg/0.3 ml 05/13/2022    FLU VAC High Dose 65 YRS & Older PRSV Free (69824) 09/01/2017, 09/14/2019, 09/15/2020, 09/17/2020, 10/01/2022    FLUZONE 6 months and older PFS 0.5 ml (56178) 10/13/2014    HIGH DOSE FLU 65 YRS AND OLDER PRSV FREE SINGLE D (03010) FLU CLINIC 10/12/2015, 10/09/2016, 09/01/2017, 09/26/2017, 09/29/2018, 09/14/2019, 09/21/2021, 09/20/2023    Influenza 10/20/2013, 10/20/2013, 09/14/2019, 09/08/2020, 09/21/2021    Moderna Covid-19 Vaccine 50mcg/0.5ml 12yrs+ (0468-1156) 09/20/2023    Pneumococcal (Prevnar 13) 10/25/2019    Pneumovax 23 10/05/2020    RSV, recombinant, RSVpreF, adjuvanted (Arexvy) 10/09/2023    TDAP 10/05/2020    Zoster Vaccine Recombinant Adjuvanted (Shingrix) 04/27/2023,  06/28/2023        ASSESSMENT AND OTHER RELEVANT CHRONIC CONDITIONS:   Ben Key is a 73 year old male who presents for a Medicare Assessment.     PLAN SUMMARY:   Diagnoses and all orders for this visit:    Annual physical exam  -     Comp Metabolic Panel (14); Future    Lumbar disc herniation with radiculopathy    Benign prostatic hyperplasia, unspecified whether lower urinary tract symptoms present    Eczema, unspecified type    Psoriasis    Smoker    Screening for lipoid disorders  -     Lipid Panel; Future    Screening for deficiency anemia  -     CBC With Differential With Platelet; Future    Screening for thyroid disorder  -     TSH W Reflex To Free T4; Future    Screening for prostate cancer  -     PSA Total, Screen; Future    Screening for diabetes mellitus    Orthostatic hypotension    Moderate Alzheimer's dementia without behavioral disturbance, psychotic disturbance, mood disturbance, or anxiety, unspecified timing of dementia onset (HCC)    Neuropathy    Vitamin D deficiency  -     Vitamin D; Future    Screening for colon cancer  -     GASTRO - INTERNAL; Future    History of colon polyps  -     GASTRO - INTERNAL; Future    Encounter for annual health examination         Orthostatic hypotension  May be related to neuropathy, 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 Martinez.  Possible Florinef/Mestinon incorporation    MGUS  Underwent neurological workup inpatient for hospitalization in 2023.  Noted to have free light chain kappa 3.2, lambda 3.5 with ratio 0.91.  Electrophoresis with IgG lambda monoclonal suspicious for MGUS.  - Established with Dr. Bustamante, last seen 9/1/2023.  Does not appear to be clinically significant, ongoing monitoring      Psoriasis  - On upadacitinib 15 mg daily -overall  well-controlled.    Hyperlipidemia  - Repeat fasting lipid panel  - Not currently on statin medication    History of colon polyps  -Reported to have 9 polyps on last colonoscopy  - Interested in colonoscopy, reasonable as he otherwise remains stable.    Chronic low back pain with radiculopathy  History of lumbar microdiscectomy  -Recently seen by Dr. Oviedo 12/26/2023 due to worsening back pain.  Treated with Medrol Dosepak, muscle relaxers, ongoing conservative measures.  -For acute flareups, would recommend:    -Acetaminophen 500-650 mg every 4-6 hours as needed for pain relief  -Ibuprofen 200-400 mg every 8 hours as needed for anti-inflammatory and pain relief  -For more severe pain, notify us as we should consider alternative medications.  -Trial of physical therapy    BPH  Seems to be controlled on Flomax  - Check PSA    Vitamin D deficiency  - Will check vitamin D level  - Continue with    Erythroderma  - Seems to be well-controlled, under surveillance by dermatology Dr. Denise Lantigua    Eczema  - Seems to be controlled on CeraVe    Dementia  Suspected Alzheimer's dementia.  Following with neurology.  Workup has included MRI brain without significant acute findings temporal atrophy and generalized volume loss.  Also had MRI cervical spine with multilevel degenerative images.  Full secondary workup largely unremarkable.  Completed neuropsychological test  -Noted vitamin D deficiency  - Last seen by Dr. Martinez 11/21/2023: MoCA 13/30.  On donepezil 10 mg, memantine 5 mg twice a day    Neuropathy  Multifactorial due to vitamin B12 deficiency, MGUS, vitamin D deficiency  - Ongoing vitamin B12 and vitamin D supplementation  -Seems to be a prominent symptom but stable for now    Insomnia  We did discuss:  Getting yourself into a rested state of mind one-on-one for bedtime  Avoid any food or liquid intake 1 hour before going to bed  Turn off all screens including your cell phone and the TV 30 minutes before bed  Try  meditation or mindfulness when read at bedtime    - An alternative would be melatonin 10 mg nightly as needed, use this in 15 minutes of trying to fall asleep due to the short onset of action  -There are other alternative medications we can consider in the future such as Ambien or Lunesta.  We can consider this if the higher dose of melatonin does not improve      HTN Screen: BP at goal  DM Screen: Check fasting sugar  HLD Screen: Check fasting lipid panel  HCV Screen: Considered low risk  HIV Screen: considered low risk  G/C/Syphilis: Considered low risk    Colon Cancer Screening (45-70): Dr Krishnamurthy 4/20/21 9 polyp   Prostate Cancer Screening: (50-70): Check PSA  Lung Cancer Screening (55-79 with 30 p/year and active < 15 years): Reassess at next visit  AAA Screening (65-75 Hx of smoking): Reassess at next visit    Influenza: Annually   Td/Tdap: Last Tdap 2020  Zoster (50+): Up-to-date  HPV (19-26): UTD  Pneumococcal: UTD    Immunization History   Administered Date(s) Administered    Covid-19 Vaccine Pfizer 30 mcg/0.3 ml 03/05/2021, 03/23/2021, 09/27/2021    Covid-19 Vaccine Pfizer Bivalent 30mcg/0.3mL 09/13/2022    Covid-19 Vaccine Pfizer Jose Juan-Sucrose 30 mcg/0.3 ml 05/13/2022    FLU VAC High Dose 65 YRS & Older PRSV Free (00549) 09/01/2017, 09/14/2019, 09/15/2020, 09/17/2020, 10/01/2022    FLUZONE 6 months and older PFS 0.5 ml (17599) 10/13/2014    HIGH DOSE FLU 65 YRS AND OLDER PRSV FREE SINGLE D (76231) FLU CLINIC 10/12/2015, 10/09/2016, 09/01/2017, 09/26/2017, 09/29/2018, 09/14/2019, 09/21/2021, 09/20/2023    Influenza 10/20/2013, 10/20/2013, 09/14/2019, 09/08/2020, 09/21/2021    Moderna Covid-19 Vaccine 50mcg/0.5ml 12yrs+ (7512-3601) 09/20/2023    Pneumococcal (Prevnar 13) 10/25/2019    Pneumovax 23 10/05/2020    RSV, recombinant, RSVpreF, adjuvanted (Arexvy) 10/09/2023    TDAP 10/05/2020    Zoster Vaccine Recombinant Adjuvanted (Shingrix) 04/27/2023, 06/28/2023         Diet assessment: good     PLAN:  The  patient indicates understanding of these issues and agrees to the plan.  Reinforced healthy diet, lifestyle, and exercise.    No follow-ups on file.     Gabriel Rodríguez MD, 1/24/2024     General Health     In the past six months, have you lost more than 10 pounds without trying?: 2 - No  Has your appetite been poor?: No  How does the patient maintain a good energy level?: Daily Walks  How would you describe your daily physical activity?: Light  How would you describe your current health state?: Fair  How do you maintain positive mental well-being?: Puzzles;Games;Visiting Friends;Visiting Family     Supplementary Documentation:   Ben Key's SCREENING SCHEDULE   Tests on this list are recommended by your physician but may not be covered, or covered at this frequency, by your insurer.   Please check with your insurance carrier before scheduling to verify coverage.   PREVENTATIVE SERVICES FREQUENCY &  COVERAGE DETAILS LAST COMPLETION DATE   Diabetes Screening    Fasting Blood Sugar / Glucose    One screening every 12 months if never tested or if previously tested but not diagnosed with pre-diabetes   One screening every 6 months if diagnosed with pre-diabetes Lab Results   Component Value Date    GLU 88 11/24/2023        Cardiovascular Disease Screening    Lipid Panel  Cholesterol  Lipoprotein (HDL)  Triglycerides Covered every 5 years for all Medicare beneficiaries without apparent signs or symptoms of cardiovascular disease Lab Results   Component Value Date    CHOLEST 186 03/15/2018    HDL 31 03/15/2018     (H) 03/15/2018    TRIG 136 03/15/2018         Electrocardiogram (EKG)   Covered if needed at Welcome to Medicare, and non-screening if indicated for medical reasons 02/03/2021      Ultrasound Screening for Abdominal Aortic Aneurysm (AAA) Covered once in a lifetime for one of the following risk factors    Men who are 65-75 years old and have ever smoked    Anyone with a family history -     Colorectal  Cancer Screening  Covered for ages 50-85; only need ONE of the following:    Colonoscopy   Covered every 10 years    Covered every 2 years if patient is at high risk or previous colonoscopy was abnormal -    Health Maintenance   Topic Date Due    Colorectal Cancer Screening  Never done       Flexible Sigmoidoscopy   Covered every 4 years -    Fecal Occult Blood Test Covered annually -   Prostate Cancer Screening    Prostate-Specific Antigen (PSA) Annually Lab Results   Component Value Date    PSA 0.9 03/15/2018     Health Maintenance   Topic Date Due    PSA  03/15/2020      Immunizations    Influenza Covered once per flu season  Please get every year 09/20/2023  No recommendations at this time    Pneumococcal Each vaccine ([id number] & [id number]) covered once after 65 Prevnar 13: 10/25/2019    [id number]: 10/05/2020     No recommendations at this time    Hepatitis B One screening covered for patients with certain risk factors   -  No recommendations at this time    Tetanus Toxoid Not covered by Medicare Part B unless medically necessary (cut with metal); may be covered with your pharmacy prescription benefits -    Tetanus, Diptheria and Pertusis TD and TDaP Not covered by Medicare Part B -  No recommendations at this time    Zoster Not covered by Medicare Part B; may be covered with your pharmacy  prescription benefits -  No recommendations at this time

## 2024-01-24 NOTE — PATIENT INSTRUCTIONS
- You were seen in clinic for regular annual check-up. We have ordered labs for you and we will call you with the results. Please obtain the bloodwork fasting for 12 hours. OK to drink water the day of your blood draw.    We have the lab downstairs on the first floor.  No appointment is necessary.  Lab hours are Monday-Friday 7:30 AM to 4:45 PM.  There may be Saturday hours 7 AM-11:00 AM as well.     Otherwise you can obtain the blood tests on the weekends at the main hospital or local immediate care/urgent care within the [facility].    -Today we did review your history with ongoing management of symptoms:    You do have orthostatic hypotension or low blood pressure worse with changes in position.  Currently on good medications to reduce the risk for falls.  - Continue with changing positions slowly, use of compression stockings, slight increase in salt-continue with midodrine  -Suspect that this is related to dysautonomia or changes in the neurological system similar to the effects of the nerves, and memory.  The goal is for fall reduction risk as best as we can.    Lets continue focusing your back pain  -For acute flareups, would recommend:    -Acetaminophen 500-650 mg every 4-6 hours as needed for pain relief  -Ibuprofen 200-400 mg every 8 hours as needed for anti-inflammatory and pain relief  -For more severe pain, notify us as we should consider alternative medications.  -Trial of physical therapy  -Follow-up with Dr. Oviedo     We are currently monitoring your MGUS for abnormal protein with hematology Dr. Bustamante    Continue following with Dr. Lantigua of dermatology for management of your psoriasis and erythroderma    For sleep, we recommended:  We did discuss:  Getting yourself into a rested state of mind one-on-one for bedtime  Avoid any food or liquid intake 1 hour before going to bed  Turn off all screens including your cell phone and the TV 30 minutes before bed  Try meditation or mindfulness  when read at bedtime    - An alternative would be melatonin 10 mg nightly as needed, use this in 15 minutes of trying to fall asleep due to the short onset of action  -There are other alternative medications we can consider in the future such as Ambien or Lunesta.  We can consider this if the higher dose of melatonin does not improve    Congratulations on stopping smoking as this is a very difficult goal to achieve    -We discussed the importance of stopping smoking to reduce risk of lung cancer, chronic lung disease/COPD, accelerated heart disease, tobacco related cancers.  Notify us of any assistance in achieving this goal  - You may be due for colonoscopy, please make an appointment with Dr. Arevalo  - Other screening tests should consider: Lung cancer screening with a low-dose CT scan of the chest, ultrasound of the abdomen  - Please continue to eat a varied diet including recommended servings of vegetables, fruits, and low fat dairy. Minimize high saturated fats (such as fast foods) and high sugar intake (such as soda)  - We recommend 150 minutes of moderate intensity exercise (brisk walking, swimming) weekly to maintain your current weight.  Targeted weight loss will require more vigorous exercise or more than 150 minutes/week.    Return to clinic in 3-4 months for follow-up    Ben Key's SCREENING SCHEDULE   Tests on this list are recommended by your physician but may not be covered, or covered at this frequency, by your insurer.   Please check with your insurance carrier before scheduling to verify coverage.   PREVENTATIVE SERVICES FREQUENCY &  COVERAGE DETAILS LAST COMPLETION DATE   Diabetes Screening    Fasting Blood Sugar / Glucose    One screening every 12 months if never tested or if previously tested but not diagnosed with pre-diabetes   One screening every 6 months if diagnosed with pre-diabetes Lab Results   Component Value Date    GLU 88 11/24/2023        Cardiovascular Disease Screening     Lipid Panel  Cholesterol  Lipoprotein (HDL)  Triglycerides Covered every 5 years for all Medicare beneficiaries without apparent signs or symptoms of cardiovascular disease Lab Results   Component Value Date    CHOLEST 186 03/15/2018    HDL 31 03/15/2018     (H) 03/15/2018    TRIG 136 03/15/2018         Electrocardiogram (EKG)   Covered if needed at Welcome to Medicare, and non-screening if indicated for medical reasons 02/03/2021      Ultrasound Screening for Abdominal Aortic Aneurysm (AAA) Covered once in a lifetime for one of the following risk factors    Men who are 65-75 years old and have ever smoked    Anyone with a family history -     Colorectal Cancer Screening  Covered for ages 50-85; only need ONE of the following:    Colonoscopy   Covered every 10 years    Covered every 2 years if patient is at high risk or previous colonoscopy was abnormal -    Health Maintenance   Topic Date Due    Colorectal Cancer Screening  Never done       Flexible Sigmoidoscopy   Covered every 4 years -    Fecal Occult Blood Test Covered annually -   Prostate Cancer Screening    Prostate-Specific Antigen (PSA) Annually Lab Results   Component Value Date    PSA 0.9 03/15/2018     Health Maintenance   Topic Date Due    PSA  03/15/2020      Immunizations    Influenza Covered once per flu season  Please get every year 09/20/2023  No recommendations at this time    Pneumococcal Each vaccine ([id number] & [id number]) covered once after 65 Prevnar 13: 10/25/2019    [id number]: 10/05/2020     No recommendations at this time    Hepatitis B One screening covered for patients with certain risk factors   -  No recommendations at this time    Tetanus Toxoid Not covered by Medicare Part B unless medically necessary (cut with metal); may be covered with your pharmacy prescription benefits -    Tetanus, Diptheria and Pertusis TD and TDaP Not covered by Medicare Part B -  No recommendations at this time    Zoster Not covered by Medicare  Part B; may be covered with your pharmacy  prescription benefits -  No recommendations at this time

## 2024-01-25 ENCOUNTER — OFFICE VISIT (OUTPATIENT)
Dept: INTERNAL MEDICINE CLINIC | Facility: CLINIC | Age: 74
End: 2024-01-25

## 2024-01-25 VITALS
TEMPERATURE: 98 F | DIASTOLIC BLOOD PRESSURE: 40 MMHG | WEIGHT: 191.81 LBS | HEART RATE: 76 BPM | SYSTOLIC BLOOD PRESSURE: 92 MMHG | HEIGHT: 70 IN | OXYGEN SATURATION: 97 % | BODY MASS INDEX: 27.46 KG/M2

## 2024-01-25 DIAGNOSIS — Z13.1 SCREENING FOR DIABETES MELLITUS: ICD-10-CM

## 2024-01-25 DIAGNOSIS — Z12.5 SCREENING FOR PROSTATE CANCER: ICD-10-CM

## 2024-01-25 DIAGNOSIS — G62.9 NEUROPATHY: ICD-10-CM

## 2024-01-25 DIAGNOSIS — F02.B0 MODERATE ALZHEIMER'S DEMENTIA WITHOUT BEHAVIORAL DISTURBANCE, PSYCHOTIC DISTURBANCE, MOOD DISTURBANCE, OR ANXIETY, UNSPECIFIED TIMING OF DEMENTIA ONSET (HCC): ICD-10-CM

## 2024-01-25 DIAGNOSIS — G30.9 MODERATE ALZHEIMER'S DEMENTIA WITHOUT BEHAVIORAL DISTURBANCE, PSYCHOTIC DISTURBANCE, MOOD DISTURBANCE, OR ANXIETY, UNSPECIFIED TIMING OF DEMENTIA ONSET (HCC): ICD-10-CM

## 2024-01-25 DIAGNOSIS — N40.0 BENIGN PROSTATIC HYPERPLASIA, UNSPECIFIED WHETHER LOWER URINARY TRACT SYMPTOMS PRESENT: ICD-10-CM

## 2024-01-25 DIAGNOSIS — F17.200 SMOKER: ICD-10-CM

## 2024-01-25 DIAGNOSIS — I95.1 ORTHOSTATIC HYPOTENSION: ICD-10-CM

## 2024-01-25 DIAGNOSIS — Z00.00 ENCOUNTER FOR ANNUAL HEALTH EXAMINATION: ICD-10-CM

## 2024-01-25 DIAGNOSIS — L40.9 PSORIASIS: ICD-10-CM

## 2024-01-25 DIAGNOSIS — Z86.010 HISTORY OF COLON POLYPS: ICD-10-CM

## 2024-01-25 DIAGNOSIS — Z00.00 ANNUAL PHYSICAL EXAM: Primary | ICD-10-CM

## 2024-01-25 DIAGNOSIS — Z12.11 SCREENING FOR COLON CANCER: ICD-10-CM

## 2024-01-25 DIAGNOSIS — M51.16 LUMBAR DISC HERNIATION WITH RADICULOPATHY: ICD-10-CM

## 2024-01-25 DIAGNOSIS — L30.9 ECZEMA, UNSPECIFIED TYPE: ICD-10-CM

## 2024-01-25 DIAGNOSIS — Z13.220 SCREENING FOR LIPOID DISORDERS: ICD-10-CM

## 2024-01-25 DIAGNOSIS — E55.9 VITAMIN D DEFICIENCY: ICD-10-CM

## 2024-01-25 DIAGNOSIS — Z13.29 SCREENING FOR THYROID DISORDER: ICD-10-CM

## 2024-01-25 DIAGNOSIS — Z13.0 SCREENING FOR DEFICIENCY ANEMIA: ICD-10-CM

## 2024-01-25 PROCEDURE — 96160 PT-FOCUSED HLTH RISK ASSMT: CPT | Performed by: INTERNAL MEDICINE

## 2024-01-25 PROCEDURE — 1170F FXNL STATUS ASSESSED: CPT | Performed by: INTERNAL MEDICINE

## 2024-01-25 PROCEDURE — 1160F RVW MEDS BY RX/DR IN RCRD: CPT | Performed by: INTERNAL MEDICINE

## 2024-01-25 PROCEDURE — 1125F AMNT PAIN NOTED PAIN PRSNT: CPT | Performed by: INTERNAL MEDICINE

## 2024-01-25 PROCEDURE — 3078F DIAST BP <80 MM HG: CPT | Performed by: INTERNAL MEDICINE

## 2024-01-25 PROCEDURE — 99499 UNLISTED E&M SERVICE: CPT | Performed by: INTERNAL MEDICINE

## 2024-01-25 PROCEDURE — 3074F SYST BP LT 130 MM HG: CPT | Performed by: INTERNAL MEDICINE

## 2024-01-25 PROCEDURE — 3008F BODY MASS INDEX DOCD: CPT | Performed by: INTERNAL MEDICINE

## 2024-01-25 PROCEDURE — G0439 PPPS, SUBSEQ VISIT: HCPCS | Performed by: INTERNAL MEDICINE

## 2024-01-25 PROCEDURE — 1159F MED LIST DOCD IN RCRD: CPT | Performed by: INTERNAL MEDICINE

## 2024-01-25 RX ORDER — MIDODRINE HYDROCHLORIDE 5 MG/1
5 TABLET ORAL 3 TIMES DAILY
COMMUNITY
Start: 2024-01-17

## 2024-01-26 ENCOUNTER — LAB ENCOUNTER (OUTPATIENT)
Dept: LAB | Facility: REFERENCE LAB | Age: 74
End: 2024-01-26
Attending: INTERNAL MEDICINE
Payer: MEDICARE

## 2024-01-26 DIAGNOSIS — Z13.29 SCREENING FOR THYROID DISORDER: ICD-10-CM

## 2024-01-26 DIAGNOSIS — Z12.5 SCREENING FOR PROSTATE CANCER: ICD-10-CM

## 2024-01-26 DIAGNOSIS — Z13.0 SCREENING FOR DEFICIENCY ANEMIA: ICD-10-CM

## 2024-01-26 DIAGNOSIS — Z13.220 SCREENING FOR LIPOID DISORDERS: ICD-10-CM

## 2024-01-26 DIAGNOSIS — E55.9 VITAMIN D DEFICIENCY: ICD-10-CM

## 2024-01-26 DIAGNOSIS — Z00.00 ANNUAL PHYSICAL EXAM: ICD-10-CM

## 2024-01-26 LAB
ALBUMIN SERPL-MCNC: 4.2 G/DL (ref 3.2–4.8)
ALBUMIN/GLOB SERPL: 1.4 {RATIO} (ref 1–2)
ALP LIVER SERPL-CCNC: 84 U/L
ALT SERPL-CCNC: 27 U/L
ANION GAP SERPL CALC-SCNC: 9 MMOL/L (ref 0–18)
AST SERPL-CCNC: 29 U/L (ref ?–34)
BASOPHILS # BLD AUTO: 0.04 X10(3) UL (ref 0–0.2)
BASOPHILS NFR BLD AUTO: 0.6 %
BILIRUB SERPL-MCNC: 0.7 MG/DL (ref 0.2–1.1)
BUN BLD-MCNC: 25 MG/DL (ref 9–23)
BUN/CREAT SERPL: 21.4 (ref 10–20)
CALCIUM BLD-MCNC: 9.6 MG/DL (ref 8.7–10.4)
CHLORIDE SERPL-SCNC: 108 MMOL/L (ref 98–112)
CHOLEST SERPL-MCNC: 176 MG/DL (ref ?–200)
CO2 SERPL-SCNC: 25 MMOL/L (ref 21–32)
COMPLEXED PSA SERPL-MCNC: 1.23 NG/ML (ref ?–4)
CREAT BLD-MCNC: 1.17 MG/DL
DEPRECATED RDW RBC AUTO: 45.1 FL (ref 35.1–46.3)
EGFRCR SERPLBLD CKD-EPI 2021: 65 ML/MIN/1.73M2 (ref 60–?)
EOSINOPHIL # BLD AUTO: 0.13 X10(3) UL (ref 0–0.7)
EOSINOPHIL NFR BLD AUTO: 1.9 %
ERYTHROCYTE [DISTWIDTH] IN BLOOD BY AUTOMATED COUNT: 12.8 % (ref 11–15)
FASTING PATIENT LIPID ANSWER: YES
FASTING STATUS PATIENT QL REPORTED: YES
GLOBULIN PLAS-MCNC: 3.1 G/DL (ref 2.8–4.4)
GLUCOSE BLD-MCNC: 90 MG/DL (ref 70–99)
HCT VFR BLD AUTO: 40.9 %
HDLC SERPL-MCNC: 43 MG/DL (ref 40–59)
HGB BLD-MCNC: 14 G/DL
IMM GRANULOCYTES # BLD AUTO: 0.03 X10(3) UL (ref 0–1)
IMM GRANULOCYTES NFR BLD: 0.4 %
LDLC SERPL CALC-MCNC: 114 MG/DL (ref ?–100)
LYMPHOCYTES # BLD AUTO: 1.33 X10(3) UL (ref 1–4)
LYMPHOCYTES NFR BLD AUTO: 19.1 %
MCH RBC QN AUTO: 32.8 PG (ref 26–34)
MCHC RBC AUTO-ENTMCNC: 34.2 G/DL (ref 31–37)
MCV RBC AUTO: 95.8 FL
MONOCYTES # BLD AUTO: 0.71 X10(3) UL (ref 0.1–1)
MONOCYTES NFR BLD AUTO: 10.2 %
NEUTROPHILS # BLD AUTO: 4.72 X10 (3) UL (ref 1.5–7.7)
NEUTROPHILS # BLD AUTO: 4.72 X10(3) UL (ref 1.5–7.7)
NEUTROPHILS NFR BLD AUTO: 67.8 %
NONHDLC SERPL-MCNC: 133 MG/DL (ref ?–130)
OSMOLALITY SERPL CALC.SUM OF ELEC: 298 MOSM/KG (ref 275–295)
PLATELET # BLD AUTO: 276 10(3)UL (ref 150–450)
POTASSIUM SERPL-SCNC: 3.8 MMOL/L (ref 3.5–5.1)
PROT SERPL-MCNC: 7.3 G/DL (ref 5.7–8.2)
RBC # BLD AUTO: 4.27 X10(6)UL
SODIUM SERPL-SCNC: 142 MMOL/L (ref 136–145)
TRIGL SERPL-MCNC: 106 MG/DL (ref 30–149)
TSI SER-ACNC: 3.11 MIU/ML (ref 0.55–4.78)
VIT D+METAB SERPL-MCNC: 30.6 NG/ML (ref 30–100)
VLDLC SERPL CALC-MCNC: 18 MG/DL (ref 0–30)
WBC # BLD AUTO: 7 X10(3) UL (ref 4–11)

## 2024-01-26 PROCEDURE — 80053 COMPREHEN METABOLIC PANEL: CPT

## 2024-01-26 PROCEDURE — 80061 LIPID PANEL: CPT

## 2024-01-26 PROCEDURE — 85025 COMPLETE CBC W/AUTO DIFF WBC: CPT

## 2024-01-26 PROCEDURE — 36415 COLL VENOUS BLD VENIPUNCTURE: CPT

## 2024-01-26 PROCEDURE — 84443 ASSAY THYROID STIM HORMONE: CPT

## 2024-01-26 PROCEDURE — 82306 VITAMIN D 25 HYDROXY: CPT

## 2024-02-06 ENCOUNTER — TELEPHONE (OUTPATIENT)
Dept: INTERNAL MEDICINE CLINIC | Facility: CLINIC | Age: 74
End: 2024-02-06

## 2024-02-06 RX ORDER — ATORVASTATIN CALCIUM 10 MG/1
10 TABLET, FILM COATED ORAL NIGHTLY
Qty: 90 TABLET | Refills: 3 | Status: CANCELLED | OUTPATIENT
Start: 2024-02-06

## 2024-02-06 NOTE — TELEPHONE ENCOUNTER
Please notify patient that I reviewed the blood work from 1/26/2024    Cholesterol is elevated, for which she may benefit from a low-dose cholesterol medication such as atorvastatin 10 mg nightly.  This is in addition to nutrition, exercise.  I have pended it if he is okay with starting it    All of his other blood test including prostate, thyroid levels are normal    Lets keep a close eye on the dizziness symptoms, memory as well and notify us if there is any worsening

## 2024-02-08 NOTE — TELEPHONE ENCOUNTER
Spoke with patient relayed physician message below. Patient verbalized understanding.    Patient states he saw his test results and they were only a little elevated but he does not want to take any medication at this time. Patient states he will focus on diet and exercise for now.   Patient will contact office if he has any new or worsening dizziness. Nothing at this time.   No orders placed.    FYI to Dr. Rodríguez

## 2024-03-01 ENCOUNTER — LAB ENCOUNTER (OUTPATIENT)
Dept: LAB | Facility: REFERENCE LAB | Age: 74
End: 2024-03-01
Attending: STUDENT IN AN ORGANIZED HEALTH CARE EDUCATION/TRAINING PROGRAM
Payer: MEDICARE

## 2024-03-01 DIAGNOSIS — D47.2 MGUS (MONOCLONAL GAMMOPATHY OF UNKNOWN SIGNIFICANCE): ICD-10-CM

## 2024-03-01 LAB
ALBUMIN SERPL-MCNC: 4.1 G/DL (ref 3.2–4.8)
ALBUMIN/GLOB SERPL: 1.4 {RATIO} (ref 1–2)
ALP LIVER SERPL-CCNC: 80 U/L
ALT SERPL-CCNC: 27 U/L
ANION GAP SERPL CALC-SCNC: 6 MMOL/L (ref 0–18)
AST SERPL-CCNC: 26 U/L (ref ?–34)
BASOPHILS # BLD AUTO: 0.03 X10(3) UL (ref 0–0.2)
BASOPHILS NFR BLD AUTO: 0.5 %
BILIRUB SERPL-MCNC: 0.5 MG/DL (ref 0.2–1.1)
BUN BLD-MCNC: 24 MG/DL (ref 9–23)
BUN/CREAT SERPL: 19.4 (ref 10–20)
CALCIUM BLD-MCNC: 9.4 MG/DL (ref 8.7–10.4)
CHLORIDE SERPL-SCNC: 111 MMOL/L (ref 98–112)
CO2 SERPL-SCNC: 25 MMOL/L (ref 21–32)
CREAT BLD-MCNC: 1.24 MG/DL
DEPRECATED RDW RBC AUTO: 44.8 FL (ref 35.1–46.3)
EGFRCR SERPLBLD CKD-EPI 2021: 61 ML/MIN/1.73M2 (ref 60–?)
EOSINOPHIL # BLD AUTO: 0.13 X10(3) UL (ref 0–0.7)
EOSINOPHIL NFR BLD AUTO: 2 %
ERYTHROCYTE [DISTWIDTH] IN BLOOD BY AUTOMATED COUNT: 12.5 % (ref 11–15)
FASTING STATUS PATIENT QL REPORTED: NO
GLOBULIN PLAS-MCNC: 2.9 G/DL (ref 2.8–4.4)
GLUCOSE BLD-MCNC: 103 MG/DL (ref 70–99)
HCT VFR BLD AUTO: 39.6 %
HGB BLD-MCNC: 13.6 G/DL
IGA SERPL-MCNC: 172 MG/DL (ref 40–350)
IGM SERPL-MCNC: 108.6 MG/DL (ref 50–300)
IMM GRANULOCYTES # BLD AUTO: 0.02 X10(3) UL (ref 0–1)
IMM GRANULOCYTES NFR BLD: 0.3 %
IMMUNOGLOBULIN PNL SER-MCNC: 1262 MG/DL (ref 650–1600)
LYMPHOCYTES # BLD AUTO: 0.92 X10(3) UL (ref 1–4)
LYMPHOCYTES NFR BLD AUTO: 14.4 %
MCH RBC QN AUTO: 33.4 PG (ref 26–34)
MCHC RBC AUTO-ENTMCNC: 34.3 G/DL (ref 31–37)
MCV RBC AUTO: 97.3 FL
MONOCYTES # BLD AUTO: 0.58 X10(3) UL (ref 0.1–1)
MONOCYTES NFR BLD AUTO: 9.1 %
NEUTROPHILS # BLD AUTO: 4.7 X10 (3) UL (ref 1.5–7.7)
NEUTROPHILS # BLD AUTO: 4.7 X10(3) UL (ref 1.5–7.7)
NEUTROPHILS NFR BLD AUTO: 73.7 %
OSMOLALITY SERPL CALC.SUM OF ELEC: 298 MOSM/KG (ref 275–295)
PLATELET # BLD AUTO: 282 10(3)UL (ref 150–450)
POTASSIUM SERPL-SCNC: 4.1 MMOL/L (ref 3.5–5.1)
PROT SERPL-MCNC: 7 G/DL (ref 5.7–8.2)
RBC # BLD AUTO: 4.07 X10(6)UL
SODIUM SERPL-SCNC: 142 MMOL/L (ref 136–145)
WBC # BLD AUTO: 6.4 X10(3) UL (ref 4–11)

## 2024-03-01 PROCEDURE — 36415 COLL VENOUS BLD VENIPUNCTURE: CPT

## 2024-03-01 PROCEDURE — 82784 ASSAY IGA/IGD/IGG/IGM EACH: CPT

## 2024-03-01 PROCEDURE — 83521 IG LIGHT CHAINS FREE EACH: CPT

## 2024-03-01 PROCEDURE — 86334 IMMUNOFIX E-PHORESIS SERUM: CPT

## 2024-03-01 PROCEDURE — 80053 COMPREHEN METABOLIC PANEL: CPT

## 2024-03-01 PROCEDURE — 84165 PROTEIN E-PHORESIS SERUM: CPT

## 2024-03-01 PROCEDURE — 85025 COMPLETE CBC W/AUTO DIFF WBC: CPT

## 2024-03-04 LAB
ALBUMIN SERPL ELPH-MCNC: 4.01 G/DL (ref 3.75–5.21)
ALBUMIN/GLOB SERPL: 1.43 {RATIO} (ref 1–2)
ALPHA1 GLOB SERPL ELPH-MCNC: 0.22 G/DL (ref 0.19–0.46)
ALPHA2 GLOB SERPL ELPH-MCNC: 0.66 G/DL (ref 0.48–1.05)
B-GLOBULIN SERPL ELPH-MCNC: 1.34 G/DL (ref 0.68–1.23)
GAMMA GLOB SERPL ELPH-MCNC: 0.57 G/DL (ref 0.62–1.7)
KAPPA LC FREE SER-MCNC: 2.45 MG/DL (ref 0.33–1.94)
KAPPA LC FREE/LAMBDA FREE SER NEPH: 0.93 {RATIO} (ref 0.26–1.65)
LAMBDA LC FREE SERPL-MCNC: 2.64 MG/DL (ref 0.57–2.63)
PROT SERPL-MCNC: 6.8 G/DL (ref 5.7–8.2)

## 2024-03-08 ENCOUNTER — OFFICE VISIT (OUTPATIENT)
Dept: HEMATOLOGY/ONCOLOGY | Facility: HOSPITAL | Age: 74
End: 2024-03-08
Attending: STUDENT IN AN ORGANIZED HEALTH CARE EDUCATION/TRAINING PROGRAM
Payer: MEDICARE

## 2024-03-08 VITALS
OXYGEN SATURATION: 97 % | DIASTOLIC BLOOD PRESSURE: 54 MMHG | SYSTOLIC BLOOD PRESSURE: 112 MMHG | RESPIRATION RATE: 18 BRPM | HEART RATE: 61 BPM | HEIGHT: 70 IN | TEMPERATURE: 98 F | WEIGHT: 195 LBS | BODY MASS INDEX: 27.92 KG/M2

## 2024-03-08 DIAGNOSIS — G62.9 NEUROPATHY: ICD-10-CM

## 2024-03-08 DIAGNOSIS — D47.2 MGUS (MONOCLONAL GAMMOPATHY OF UNKNOWN SIGNIFICANCE): Primary | ICD-10-CM

## 2024-03-08 PROCEDURE — 99213 OFFICE O/P EST LOW 20 MIN: CPT | Performed by: STUDENT IN AN ORGANIZED HEALTH CARE EDUCATION/TRAINING PROGRAM

## 2024-03-08 NOTE — PROGRESS NOTES
[facility]  Hematology Progress Note    Patient Name: Ben Key   YOB: 1950   Medical Record Number: [id number]  Referring Physician(s): Heather Martinez    Reason for consultation: monoclonal gammopathy    Subjective:   Ben Key is a 73 year old male with a history of BPH, dementia, peripheral neuropathy with B12 deficiency and other comorbidities who presents to hematology regarding monoclonal gammopathy.  The patient was admitted to the hospital in July 2023 for generalized weakness and a mechanical fall, found to have an FUO and orthostatic hypotension and was started on midodrine.  He was evaluated by neurology inpatient and was seen outpatient on 8/10.  Free light chain level in May kappa 3.2 lambda 3.5 with a ratio of 0.91, immunofixation showed an IgG lambda monoclonal protein within the beta-2 region suspicious for MGUS.  The patient was referred to hematology and initially evaluated 8/23/2023 and we have previously discussed low risk IgG lambda MGUS.  He presents for 6-month follow-up.    Medically, the patient is doing well.  He is on midodrine and this has helped the orthostatic hypotension.  Neuropathy is unchanged but no other complaints today.    Review of Systems:  Hematology/Oncology ROS performed and negative except as above in HPI    History/Other:   Current Medications:   melatonin 5 MG Oral Cap Take 1 capsule (5 mg total) by mouth nightly.      midodrine 5 MG Oral Tab Take 1 tablet (5 mg total) by mouth in the morning and 1 tablet (5 mg total) at noon and 1 tablet (5 mg total) in the evening.      vitamin E 100 UNITS Oral Cap Take 1 capsule (100 Units total) by mouth daily.      Cyanocobalamin (VITAMIN B12) 500 MCG Oral Tab Take 500 mcg by mouth daily.      donepezil 10 MG Oral Tab Take 1 tablet (10 mg total) by mouth nightly. 90 tablet 3    memantine 5 MG Oral Tab Take 1 tablet (5 mg total) by mouth daily. 90 tablet 3    acetaminophen 500 MG  Oral Tab Take 1 tablet (500 mg total) by mouth every 4 (four) hours as needed for Fever (equal to or greater than 100.4).  0    sennosides 17.2 MG Oral Tab Take 1 tablet (17.2 mg total) by mouth nightly as needed (constipation, as needed if no bowel movement that day).  0    tamsulosin (FLOMAX) cap TAKE 30 MINUTES AFTER A MEAL AT THE SAME TIME EVERY DAY      Emollient (CERAVE) External Cream Apply topically daily.       Allergies:   Allergies   Allergen Reactions    Dupixent [Dupilumab] SWELLING    Mildew OTHER (SEE COMMENTS)     Sneezing    Mold OTHER (SEE COMMENTS)     Sneezing         Objective:   Blood pressure 112/54, pulse 61, temperature 97.8 °F (36.6 °C), temperature source Oral, resp. rate 18, height 1.778 m (5' 10\"), weight 88.5 kg (195 lb), SpO2 97%.  Physical Exam:  General: A&Ox3, NAD  HEENT: PERRL  CV: RRR  Pulm: normal effort  Extremities: no edema  Neurological: grossly intact    Results:   Labs:  Lab Results   Component Value Date    WBC 6.4 03/01/2024    HGB 13.6 03/01/2024    HCT 39.6 03/01/2024    .0 03/01/2024    CREATSERUM 1.24 03/01/2024    BUN 24 (H) 03/01/2024     03/01/2024    K 4.1 03/01/2024     03/01/2024    CO2 25.0 03/01/2024     (H) 03/01/2024    CA 9.4 03/01/2024    ALB 4.1 03/01/2024    ALB 4.01 03/01/2024    ALKPHO 80 03/01/2024    BILT 0.5 03/01/2024    TP 7.0 03/01/2024    TP 6.8 03/01/2024    AST 26 03/01/2024    ALT 27 03/01/2024    PTT 29.7 02/03/2021    INR 0.91 02/03/2021    T4F 1.0 07/16/2023    TSH 3.111 01/26/2024    PSA 0.9 03/15/2018    MG 2.1 11/24/2023     (H) 07/16/2023    B12 681 07/16/2023     Assessment & Plan:   Ben Key is a 73 year old male with a history of BPH, dementia, peripheral neuropathy with B12 deficiency and other comorbidities who presents to hematology regarding monoclonal gammopathy.  The patient was admitted to the hospital in July for generalized weakness and a mechanical fall, found to have an FUO and  orthostatic hypotension and was started on midodrine.  He was evaluated by neurology inpatient and was seen outpatient on 8/10.  Free light chain level in May kappa 3.2 lambda 3.5 with a ratio of 0.91, immunofixation showed an IgG lambda monoclonal protein within the beta-2 region suspicious for MGUS. The patient was referred to hematology and initially evaluated 8/23/2023 and we have previously discussed low risk IgG lambda MGUS.  He presents for 6-month follow-up.    Small IgG lambda MGUS is stable.  Again, not contributing to the patient's underlying autonomic dysfunction or neuropathy.  We can now plan for yearly follow-up.    MDM: Dick Bustamante DO    [facility]  [facility]    This note was created using a voice-recognition transcribing system. Incorrect words or phrases may have been missed during proofreading. Please interpret accordingly.

## 2024-03-18 ENCOUNTER — OFFICE VISIT (OUTPATIENT)
Facility: CLINIC | Age: 74
End: 2024-03-18
Payer: MEDICARE

## 2024-03-18 ENCOUNTER — TELEPHONE (OUTPATIENT)
Facility: CLINIC | Age: 74
End: 2024-03-18

## 2024-03-18 VITALS
BODY MASS INDEX: 27.92 KG/M2 | DIASTOLIC BLOOD PRESSURE: 67 MMHG | SYSTOLIC BLOOD PRESSURE: 114 MMHG | WEIGHT: 195 LBS | HEIGHT: 70 IN

## 2024-03-18 DIAGNOSIS — K59.09 CHRONIC CONSTIPATION: ICD-10-CM

## 2024-03-18 DIAGNOSIS — Z86.010 HISTORY OF COLON POLYPS: ICD-10-CM

## 2024-03-18 DIAGNOSIS — Z12.11 SCREENING FOR COLON CANCER: Primary | ICD-10-CM

## 2024-03-18 NOTE — H&P
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 Surgical hx spine surgery, cholecystectomy and cataract surgery. Other hx in table below. He presents for colon cancer screening evaluation. He is here with wife Ирина.    #chronic constipation  -baseline bowel habits are bowel movement once every 3-4 days. Stool is brown, hard and strains to have bowel movement. He takes milk of magnesia about 1x monthly. -tries to eat fiber with whole grain bread, cereals, vegetables. Does not eat much fruit. He has coffee in the morning. Has pop in afternoon. Has water at night.     Patient is here today as a referral from his PCP for evaluation prior to undergoing colonoscopy for CRC screening. Patient denies any GI symptoms of nausea, vomiting, heartburn, dyspepsia, dysphagia, hematemesis, abdominal pain, change in bowel habits, diarrhea, hematochezia, or melena.  Additionally there is no weight loss and no reported chest pain or shortness of breath.     Pt is due for CRC screening. They are electing to pursue colonoscopy at this time.     Last colonoscopy: 2021@[email or web address] - 3 year recall  -9 polyps removed  -will request biopsy result  Last EGD: none     NSAIDS: none   Tobacco: former 2021  Alcohol: none   Marijuana:none   Illicit drugs:none     FH GI malignancy: none     YES  history of adverse reaction to sedation - After spine surgery he couldn't remember how to use TV remote or mobile phone.   No PIERRE  No anticoagulants  No pacemaker/defibrillator  No pain medications and/or sleep aides    Wt Readings from Last 6 Encounters:   03/18/24 195 lb  (88.5 kg)   03/14/24 195 lb (88.5 kg)   03/08/24 195 lb (88.5 kg)   01/25/24 191 lb 12.8 oz (87 kg)   09/01/23 194 lb 12.8 oz (88.4 kg)   08/23/23 195 lb 3.2 oz (88.5 kg)          History, Medications, Allergies, ROS:      Past Medical History:   Diagnosis Date    Back problem     BPH (benign prostatic hyperplasia)     Eczema     Hypotension     orthostatic    Lumbar disc herniation with radiculopathy     Right leg weakness     Visual impairment     glasses      Past Surgical History:   Procedure Laterality Date    CATARACT EXTRACTION W/  INTRAOCULAR LENS IMPLANT Left 11/16/2021    PC IOL topical IV sedation- Dr. Barriga @ [facility]    CATARACT EXTRACTION W/  INTRAOCULAR LENS IMPLANT Right 10/19/2021    Dr. Barriga    CATARACT EXTRACTION W/ INTRAOCULAR LENS IMPLANT Left 11/2021    Dr. Barriga     CHOLECYSTECTOMY      COLONOSCOPY      OTHER  11/2021    left eye     OTHER  10/2021    right eye    SPINE SURGERY PROCEDURE UNLISTED  02/15/2021    LEFT LUMBAR 4 -LUMBAR 5 MICRODISCECTOMY per Dr. Hernandez      Family Hx:   Family History   Problem Relation Age of Onset    Other (Dementia) Mother         Late 70s    Glaucoma Mother     Other (partial colectomy) Mother     Heart Disorder Father         2 heart attacks, Bypass surgery, pacemaker    Macular degeneration Neg     Diabetes Neg       Social History:   Social History     Socioeconomic History    Marital status:    Tobacco Use    Smoking status: Former     Packs/day: 1.00     Years: 40.00     Additional pack years: 0.00     Total pack years: 40.00     Types: Cigarettes     Quit date: 2021     Years since quitting: 3.2    Smokeless tobacco: Never   Vaping Use    Vaping Use: Never used   Substance and Sexual Activity    Alcohol use: Not Currently    Drug use: No   Other Topics Concern    Caffeine Concern Yes     Comment: soda 4 cans a day    Exercise No    Seat Belt No    Special Diet No    Stress Concern No    Weight Concern No    Pt has a pacemaker No    Pt has a defibrillator  No    Reaction to local anesthetic No        Medications (Active prior to today's visit):  Current Outpatient Medications   Medication Sig Dispense Refill    polyethylene glycol, PEG 3350-KCl-NaBcb-NaCl-NaSulf, 236 g Oral Recon Soln Take 4,000 mL by mouth As Directed. Take 2,000 mL the night before your procedure and 2,000 mL the morning of your procedure. Take as directed by GI clinic. Okay to substitute for generic. 1 each 0    Multiple Vitamins-Minerals (CENTRUM SILVER 50+MEN) Oral Tab       Upadacitinib ER (RINVOQ) 15 MG Oral Tablet 24 Hr       naproxen 500 MG Oral Tab Take 1 tablet (500 mg total) by mouth 2 (two) times daily with meals.      cyclobenzaprine 10 MG Oral Tab Take 1 tablet (10 mg total) by mouth 3 (three) times daily.      melatonin 5 MG Oral Cap Take 1 capsule (5 mg total) by mouth nightly.      midodrine 5 MG Oral Tab Take 1 tablet (5 mg total) by mouth in the morning and 1 tablet (5 mg total) at noon and 1 tablet (5 mg total) in the evening.      vitamin E 100 UNITS Oral Cap Take 1 capsule (100 Units total) by mouth daily.      Cyanocobalamin (VITAMIN B12) 500 MCG Oral Tab Take 500 mcg by mouth daily.      donepezil 10 MG Oral Tab Take 1 tablet (10 mg total) by mouth nightly. 90 tablet 3    memantine 5 MG Oral Tab Take 1 tablet (5 mg total) by mouth daily. 90 tablet 3    acetaminophen 500 MG Oral Tab Take 1 tablet (500 mg total) by mouth every 4 (four) hours as needed for Fever (equal to or greater than 100.4).  0    sennosides 17.2 MG Oral Tab Take 1 tablet (17.2 mg total) by mouth nightly as needed (constipation, as needed if no bowel movement that day).  0    tamsulosin (FLOMAX) cap TAKE 30 MINUTES AFTER A MEAL AT THE SAME TIME EVERY DAY      Emollient (CERAVE) External Cream Apply topically daily.         Allergies:  Allergies   Allergen Reactions    Dupixent [Dupilumab] SWELLING    Mildew OTHER (SEE COMMENTS)     Sneezing    Mold OTHER (SEE COMMENTS)     Sneezing       ROS:    CONSTITUTIONAL: negative for fevers, chills, sweats  EYES Negative for scleral icterus or redness, and diplopia  HEENT: Negative for hoarseness  RESPIRATORY: Negative for cough and severe shortness of breath  CARDIOVASCULAR: Negative for crushing sub-sternal chest pain  GASTROINTESTINAL: See HPI  GENITOURINARY: Negative for dysuria  MUSCULOSKELETAL: Negative for arthralgias and myalgias  SKIN: Negative for jaundice, rash or pruritus  NEUROLOGICAL: Negative for dizziness and headaches  BEHAVIOR/PSYCH: Negative for psychotic behavior      PHYSICAL EXAM:   Blood pressure 114/67, height 5' 10\" (1.778 m), weight 195 lb (88.5 kg).    GEN: Alert, no acute distress, well-nourished   HEENT: anicteric sclera, neck supple, trachea midline, MMM, no palpable or tender neck or supraclavicular lymph nodes  CV: RRR, the extremities are warm and well perfused   LUNGS: No increased work of breathing, CTAB  ABDOMEN: Soft, symmetrical, non-tender without distention or guarding. No scars or lesions. Aorta is without bruit or visible pulsation. Umbilicus is midline without herniation. Normoactive bowel sounds are present, No masses, hepatomegaly or splenomegaly noted.  MSK: No erythema, no warmth, no swelling of joints  SKIN: No jaundice, no erythema, no rashes, no lesions  HEMATOLOGIC: No bleeding, no bruising  NEURO: Alert and interactive, COTTER  PSYCH: appropriate mood & affect    Labs/Imaging:     Patient's labs and imaging were reviewed and discussed with patient today.    .  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 Surgical hx spine surgery, cholecystectomy and cataract surgery. Other hx in table below. He presents for colon cancer screening evaluation. He is here with wife Ирина.    #chronic constipation  -baseline bowel habits are bowel movement once every 3-4 days. Stool is brown, hard and  strains to have bowel movement. He takes milk of magnesia about 1x monthly. -tries to eat fiber with whole grain bread, cereals, vegetables. Does not eat much fruit. He has coffee in the morning. Has pop in afternoon. Has water at night.   -possibly related to dehydration, dementia diagnosis, sedentary lifestyle. Unlikely structural issues given prior colonoscopies.     # Average Risk screening: patient is considered average risk for colon cancer (No family hx of colon cancer) and it is appropriate to proceed with screening colonoscopy. Patient is currently asymptomatic and denies diarrhea, hematochezia, thin-stools or weight loss. We discussed risks/benefits/alternatives to procedure, including CT colonography and stool testing, they want to proceed with colonoscopy.  -No anemia noted on lab work 3/1/24.    Recommend:  For constipation/hard stools/straining:  - increase water intake throughout the day    - can restart fiber supplement daily, such as metamucil    -to soften stool can try over the counter stool softener - docusate(colace) OR osmotic laxative - miralax     -follow up as needed    Schedule colonoscopy with Dr. Doty  Diagnosis: CRC screen, hx colon polyps, chronic constipation  Sedation: MAC  Prep: split dose golytely  -Anti-platelets and anti-coagulants: N/A   -Diabetes meds: N/A     Colonoscopy consent: I have discussed the risks, benefits, and alternatives to colonoscopy with the patient [who demonstrated understanding], including but not limited to the risks of bleeding, infection, pain, as well as the risks of anesthesia and perforation all leading to prolonged hospitalization, surgical intervention. I also specifically mentioned the miss rate of colonoscopy of 5-10% in the best of all circumstances. All questions were answered to the patient’s satisfaction. The patient elected to proceed with colonoscopy with intervention [i.e. polypectomy, etc.] as indicated.    Orders This Visit:  No  orders of the defined types were placed in this encounter.      Meds This Visit:  Requested Prescriptions     Signed Prescriptions Disp Refills    polyethylene glycol, PEG 3350-KCl-NaBcb-NaCl-NaSulf, 236 g Oral Recon Soln 1 each 0     Sig: Take 4,000 mL by mouth As Directed. Take 2,000 mL the night before your procedure and 2,000 mL the morning of your procedure. Take as directed by GI clinic. Okay to substitute for generic.       Imaging & Referrals:  [facility]STR[facility] - INTERNAL       MARY ALICE Mayers    [facility]  3/18/2024      The dictation was partially prepared using Dragon Medical voice recognition software. As a result, errors may occur. When identified, these errors have been corrected. While every attempt is made to correct errors during dictation, discrepancies may still exist.

## 2024-03-18 NOTE — PATIENT INSTRUCTIONS
For constipation/hard stools/straining\"  - increase water intake throughout the day    - can restart fiber supplement daily, such as metamucil    -to soften stool can try over the counter stool softener - docusate(colace) OR osmotic laxative - miralax     -follow up as needed    1. Schedule colonoscopy with Dr. Doty  Diagnosis: CRC screen, hx colon polyps, chronic constipation  Sedation: MAC  Prep: split dose golytely    2.  bowel prep from pharmacy   You can pick the bowel prep up now and store in a cool, dry place in your home until your scheduled bowel prep start date.    3. Continue all medications as normal for your procedure. DO NOT TAKE: Any form of alcohol, recreational drugs and any forms of erectile dysfunction medications 24 hours prior to procedure.    4. Read all bowel prep instructions carefully. Bowel prep instructions can also be found online at:  www.[facility].org/giprep     5. AVOID seeds, nuts, popcorn, raw fruits and vegetables for 5 days before procedure    6. If you start any NEW medication after your visit today, please notify us. Certain medications (like iron or weight loss medications) will need to be held before the procedure, or the procedure cannot be performed safely.

## 2024-03-18 NOTE — TELEPHONE ENCOUNTER
I received a successful fax confirmation from [facility] and now the forms are now being sent to scanning. Please place requested medical records on MARY ALICE Mayers desk. Thank you!

## 2024-03-21 ENCOUNTER — TELEPHONE (OUTPATIENT)
Facility: CLINIC | Age: 74
End: 2024-03-21

## 2024-03-21 DIAGNOSIS — K59.00 CONSTIPATION, UNSPECIFIED CONSTIPATION TYPE: ICD-10-CM

## 2024-03-21 DIAGNOSIS — Z86.010 HX OF COLONIC POLYPS: ICD-10-CM

## 2024-03-21 DIAGNOSIS — Z12.11 SCREEN FOR COLON CANCER: Primary | ICD-10-CM

## 2024-03-21 NOTE — TELEPHONE ENCOUNTER
Scheduled for:  Colonoscopy 34548  Provider Name:  Dr. Doty  Date:  07/31/2024  Location:[facility]  Sedation:  MAC  Time:  10:45am(Patient is aware arrival time is at 9:45am)  Prep:  Trilyte Prep Instructions Given At The Office Visit.    Meds/Allergies Reconciled?:  MARY ALICE Mayers Reviewed  Diagnosis with codes: Colon Screening Z12.11/ Hx of Colon Polyps Z86.010/ Constipation K59.00   Was patient informed to call insurance with codes (Y/N):  Yes  Referral sent?:  Referral was sent at the time of electronic surgical scheduling.  [facility] or [facility] notified?:  I sent an electronic request to Endo Scheduling and received a confirmation today.  Medication Orders:  Pt is aware to NOT take iron pills, herbal meds and diet supplements for 7 days before exam. Also to NOT take any form of alcohol, recreational drugs and any forms of ED meds 24 hours before exam.   Misc Orders:       Further instructions given by staff:  I provide prep instructions to patient at the time of the appointment and reviewed date, time and location, he verbalized that he understood and is aware to call if he has any questions.    Patient was informed about the new cancellation policy for his/her procedure. Patient was also given a copy of the cancellation policy at the time of the appointment and verbalized understanding.

## 2024-04-08 ENCOUNTER — OFFICE VISIT (OUTPATIENT)
Dept: INTERNAL MEDICINE CLINIC | Facility: CLINIC | Age: 74
End: 2024-04-08
Payer: MEDICARE

## 2024-04-08 VITALS
HEIGHT: 70 IN | BODY MASS INDEX: 27.69 KG/M2 | OXYGEN SATURATION: 99 % | DIASTOLIC BLOOD PRESSURE: 58 MMHG | HEART RATE: 94 BPM | SYSTOLIC BLOOD PRESSURE: 122 MMHG | TEMPERATURE: 98 F | WEIGHT: 193.38 LBS

## 2024-04-08 DIAGNOSIS — Z86.010 HISTORY OF COLON POLYPS: ICD-10-CM

## 2024-04-08 DIAGNOSIS — M54.2 NECK PAIN: Primary | ICD-10-CM

## 2024-04-08 DIAGNOSIS — R22.1 MASS OF RIGHT SIDE OF NECK: ICD-10-CM

## 2024-04-08 DIAGNOSIS — N40.0 BENIGN PROSTATIC HYPERPLASIA, UNSPECIFIED WHETHER LOWER URINARY TRACT SYMPTOMS PRESENT: ICD-10-CM

## 2024-04-08 DIAGNOSIS — G30.9 MODERATE ALZHEIMER'S DEMENTIA WITHOUT BEHAVIORAL DISTURBANCE, PSYCHOTIC DISTURBANCE, MOOD DISTURBANCE, OR ANXIETY, UNSPECIFIED TIMING OF DEMENTIA ONSET (HCC): ICD-10-CM

## 2024-04-08 DIAGNOSIS — G62.9 NEUROPATHY: ICD-10-CM

## 2024-04-08 DIAGNOSIS — F02.B0 MODERATE ALZHEIMER'S DEMENTIA WITHOUT BEHAVIORAL DISTURBANCE, PSYCHOTIC DISTURBANCE, MOOD DISTURBANCE, OR ANXIETY, UNSPECIFIED TIMING OF DEMENTIA ONSET (HCC): ICD-10-CM

## 2024-04-08 DIAGNOSIS — M48.02 CERVICAL SPINAL STENOSIS: ICD-10-CM

## 2024-04-08 DIAGNOSIS — L40.9 PSORIASIS: ICD-10-CM

## 2024-04-08 DIAGNOSIS — M51.16 LUMBAR DISC HERNIATION WITH RADICULOPATHY: ICD-10-CM

## 2024-04-08 DIAGNOSIS — I95.1 ORTHOSTATIC HYPOTENSION: ICD-10-CM

## 2024-04-08 PROCEDURE — 99214 OFFICE O/P EST MOD 30 MIN: CPT | Performed by: INTERNAL MEDICINE

## 2024-04-08 RX ORDER — GLYCOPYRROLATE 1 MG/1
1 TABLET ORAL 3 TIMES DAILY PRN
Qty: 30 TABLET | Refills: 0 | Status: SHIPPED | OUTPATIENT
Start: 2024-04-08

## 2024-04-08 RX ORDER — CYCLOBENZAPRINE HCL 5 MG
5 TABLET ORAL 3 TIMES DAILY PRN
Qty: 60 TABLET | Refills: 1 | Status: SHIPPED | OUTPATIENT
Start: 2024-04-08

## 2024-04-08 NOTE — PROGRESS NOTES
Chief Complaint:   Chief Complaint   Patient presents with    Pain     4-6 sudden onset pain to Rt neck area. No injury         HPI:     Mr. MAY is a 73 year old male PMHX MGUS, psoriasis, hyperlipidemia, chronic low back pain, erythroderma, cognitive impairment coming in for neck pain.    2 weeks ago, worsened last night. Couldn't lay down, get up or get out of bed. Really bad to consider. No injury in the past. Has had this in the past after a biopsy in the side. Sharp pain and achy. Tylenol seems to help - needed 3 in a day. It was more tolerable. Not as severe today. Stays in there area.    Leg cramps at night with sleeping. This occurs every night. Crawling into bed he would wake up in the middle of the night. Behind the knee and down.     Saliva excessive secretions for more than a month. No nausea or vomiting. No other secretions.     Not a lot of water during the day.     Past Medical History:   Diagnosis Date    Allergic rhinitis Since childhood    Hayfever    Arthritis 12 years ago    Work related    Back problem     BPH (benign prostatic hyperplasia)     Depression When eczema started    Eczema     Hypotension     orthostatic    Lumbar disc herniation with radiculopathy     Right leg weakness     Visual impairment     glasses     Past Surgical History:   Procedure Laterality Date    BACK SURGERY  2/15/2021    Clean out disc    CATARACT  2021    CATARACT EXTRACTION W/  INTRAOCULAR LENS IMPLANT Left 11/16/2021    PC IOL topical IV sedation- Dr. Barriga @ [facility]    CATARACT EXTRACTION W/  INTRAOCULAR LENS IMPLANT Right 10/19/2021    Dr. Barriga    CATARACT EXTRACTION W/ INTRAOCULAR LENS IMPLANT Left 11/2021    Dr. Barriga     CHOLECYSTECTOMY      COLONOSCOPY      COLONOSCOPY  April, 2021    OTHER  11/2021    left eye     OTHER  10/2021    right eye    SPINE SURGERY PROCEDURE UNLISTED  02/15/2021    LEFT LUMBAR 4 -LUMBAR 5 MICRODISCECTOMY per Dr. Hernandez    TONSILLECTOMY  As a child     Social History:  Social History      Socioeconomic History    Marital status:    Tobacco Use    Smoking status: Former     Packs/day: 0.00     Years: 46.00     Additional pack years: 0.00     Total pack years: 0.00     Types: Cigarettes     Quit date: 2022     Years since quittin.0     Passive exposure: Past    Smokeless tobacco: Never    Tobacco comments:     Long time smoker, finally quit under doctor care   Vaping Use    Vaping Use: Never used   Substance and Sexual Activity    Alcohol use: Not Currently     Comment: Due to medications I have not drank for several years.    Drug use: Never   Other Topics Concern    Caffeine Concern Yes     Comment: soda 4 cans a day    Exercise No    Seat Belt No    Special Diet No    Stress Concern No    Weight Concern No    Pt has a pacemaker No    Pt has a defibrillator No    Reaction to local anesthetic No     Family History:  Family History   Problem Relation Age of Onset    Other (Dementia) Mother         Late 70s    Glaucoma Mother     Other (partial colectomy) Mother     Dementia Mother         In later years    Depression Mother     Heart Disorder Father         Bypass surgery    Macular degeneration Neg     Diabetes Neg      Allergies:  Allergies   Allergen Reactions    Dupilumab SWELLING    Mildew OTHER (SEE COMMENTS)     Sneezing    Mold OTHER (SEE COMMENTS)     Sneezing     Current Meds:  Current Outpatient Medications   Medication Sig Dispense Refill    Multiple Vitamins-Minerals (CENTRUM SILVER 50+MEN) Oral Tab       Upadacitinib ER (RINVOQ) 15 MG Oral Tablet 24 Hr       melatonin 5 MG Oral Cap Take 1 capsule (5 mg total) by mouth nightly.      midodrine 5 MG Oral Tab Take 1 tablet (5 mg total) by mouth in the morning and 1 tablet (5 mg total) at noon and 1 tablet (5 mg total) in the evening.      vitamin E 100 UNITS Oral Cap Take 1 capsule (100 Units total) by mouth daily.      Cyanocobalamin (VITAMIN B12) 500 MCG Oral Tab Take 500 mcg by mouth daily.      donepezil 10 MG Oral Tab  Take 1 tablet (10 mg total) by mouth nightly. 90 tablet 3    memantine 5 MG Oral Tab Take 1 tablet (5 mg total) by mouth daily. 90 tablet 3    acetaminophen 500 MG Oral Tab Take 1 tablet (500 mg total) by mouth every 4 (four) hours as needed for Fever (equal to or greater than 100.4).  0    tamsulosin (FLOMAX) cap TAKE 30 MINUTES AFTER A MEAL AT THE SAME TIME EVERY DAY      polyethylene glycol, PEG 3350-KCl-NaBcb-NaCl-NaSulf, 236 g Oral Recon Soln Take 4,000 mL by mouth As Directed. Take 2,000 mL the night before your procedure and 2,000 mL the morning of your procedure. Take as directed by GI clinic. Okay to substitute for generic. (Patient not taking: Reported on 4/8/2024) 1 each 0    naproxen 500 MG Oral Tab Take 1 tablet (500 mg total) by mouth 2 (two) times daily with meals. (Patient not taking: Reported on 4/8/2024)      cyclobenzaprine 10 MG Oral Tab Take 1 tablet (10 mg total) by mouth 3 (three) times daily. (Patient not taking: Reported on 4/8/2024)      sennosides 17.2 MG Oral Tab Take 1 tablet (17.2 mg total) by mouth nightly as needed (constipation, as needed if no bowel movement that day). (Patient not taking: Reported on 4/8/2024)  0    Emollient (CERAVE) External Cream Apply topically daily. (Patient not taking: Reported on 4/8/2024)        Counseling given: Not Answered  Tobacco comments: Long time smoker, finally quit under doctor care       REVIEW OF SYSTEMS:   Positive Findings indicated in BOLD    Constitutional: Fever, Chills, Weight Gain, Weight Loss, Night Sweats, Fatigue, Malaise  ENT/Mouth:  Hearing Changes, Ear Pain, Nasal Congestion, Sinus Pain, Hoarseness, Sore throat, Rhinorrhea, Swallowing Difficulty, +Excessive salivation  Eyes: Eye Pain, Swelling, Redness, Foreign Body, Discharge, Vision Changes  Cardiovascular: Chest Pain, SOB, PND, Dyspnea on Exertion, Orthopnea, Claudication, Edema, Palpitations  Respiratory: Cough, Sputum, Wheezing, Shortness of breath  Gastrointestinal: Nausea,  Vomiting, Diarrhea, Constipation, Pain, Heartburn, Dysphagia, Bloody stools, Tarry stools  Genitourinary: Dysmenorrhea, Dysuria, Urinary Frequency, Hematuria, Urinary Incontinence, Urgency,  Flank Pain  Musculoskeletal: Arthralgias, Myalgias, Joint Swelling, Joint Stiffness, Back Pain, Neck Pain, Leg cramps  Integumentary: Skin Lesions, Pruritis, Hair Changes, Jaundice, Nail changes  Neuro: Weakness, Numbness, Paresthesias, Loss of Consciousness, Syncope, Dizziness, Headache, Falls  Psych: Anxiety, Depression, Insomnia, Suicidal Ideation, Homicidal ideation, Memory Changes  Heme/Lymph: Bruising, Bleeding, Lymphadenopathy  Endocrine: Polyuria, Polydipsia, Temperature Intolerance    EXAM:   Vital Signs:  Blood pressure 122/58, pulse 94, temperature 98 °F (36.7 °C), temperature source Oral, height 5' 10\" (1.778 m), weight 193 lb 6.4 oz (87.7 kg), SpO2 99%.     Constitutional: No acute distress. Alert and oriented x 3.  Eyes: EOMI, PERRLA, clear sclera b/l  HENT: NCAT, Moist mucous membranes, Oropharynx without erythema or exudates  Neck: No JVD, no thyromegaly  +Old biopsy site - palpable soft tissue prominence R neck  Cardiovascular: S1, S2, no S3, no S4, Regular rate and rhythm, No murmurs/gallops/rubs.   Respiratory: Clear to auscultation bilaterally.  No wheezes/rales/rhonchi  Gastrointestinal: Soft, nontender, nondistended. Positive bowel sounds x 4. No rebound tenderness. No hepatomegaly, No splenomegaly  Genitourinary: No CVA tenderness bilaterally  Neurologic: No focal neurological deficits, CN II-XII intact, light touch intact  Musculoskeletal: Full range of motion of all extremities, no clubbing/swelling/edema  Skin: No lesions, No erythema, no jaundice, Cap Refill < 2s  Psychiatric: Appropriate mood and affect  Heme/Lymph/Immune: No cervical LAD    DATA REVIEWED   Labs:  Recent Results (from the past 8760 hour(s))   COMP METABOLIC PANEL [E]    Collection Time: 03/01/24  8:01 AM   Result Value Ref Range     Glucose 103 (H) 70 - 99 mg/dL    Sodium 142 136 - 145 mmol/L    Potassium 4.1 3.5 - 5.1 mmol/L    Chloride 111 98 - 112 mmol/L    CO2 25.0 21.0 - 32.0 mmol/L    Anion Gap 6 0 - 18 mmol/L    BUN 24 (H) 9 - 23 mg/dL    Creatinine 1.24 0.70 - 1.30 mg/dL    BUN/CREA Ratio 19.4 10.0 - 20.0    Calcium, Total 9.4 8.7 - 10.4 mg/dL    Calculated Osmolality 298 (H) 275 - 295 mOsm/kg    eGFR-Cr 61 >=60 mL/min/1.73m2    ALT 27 10 - 49 U/L    AST 26 <=34 U/L    Alkaline Phosphatase 80 45 - 117 U/L    Bilirubin, Total 0.5 0.2 - 1.1 mg/dL    Total Protein 7.0 5.7 - 8.2 g/dL    Albumin 4.1 3.2 - 4.8 g/dL    Globulin  2.9 2.8 - 4.4 g/dL    A/G Ratio 1.4 1.0 - 2.0    Patient Fasting for CMP? No      *Note: Due to a large number of results and/or encounters for the requested time period, some results have not been displayed. A complete set of results can be found in Results Review.       Recent Results (from the past 8760 hour(s))   CBC W/ DIFFERENTIAL    Collection Time: 03/01/24  8:01 AM   Result Value Ref Range    WBC 6.4 4.0 - 11.0 x10(3) uL    RBC 4.07 3.80 - 5.80 x10(6)uL    HGB 13.6 13.0 - 17.5 g/dL    HCT 39.6 39.0 - 53.0 %    MCV 97.3 80.0 - 100.0 fL    MCH 33.4 26.0 - 34.0 pg    MCHC 34.3 31.0 - 37.0 g/dL    RDW-SD 44.8 35.1 - 46.3 fL    RDW 12.5 11.0 - 15.0 %    .0 150.0 - 450.0 10(3)uL    Neutrophil Absolute Prelim 4.70 1.50 - 7.70 x10 (3) uL    Neutrophil Absolute 4.70 1.50 - 7.70 x10(3) uL    Lymphocyte Absolute 0.92 (L) 1.00 - 4.00 x10(3) uL    Monocyte Absolute 0.58 0.10 - 1.00 x10(3) uL    Eosinophil Absolute 0.13 0.00 - 0.70 x10(3) uL    Basophil Absolute 0.03 0.00 - 0.20 x10(3) uL    Immature Granulocyte Absolute 0.02 0.00 - 1.00 x10(3) uL    Neutrophil % 73.7 %    Lymphocyte % 14.4 %    Monocyte % 9.1 %    Eosinophil % 2.0 %    Basophil % 0.5 %    Immature Granulocyte % 0.3 %     *Note: Due to a large number of results and/or encounters for the requested time period, some results have not been displayed. A  complete set of results can be found in Results Review.       Imaging:  MRI cervical spine 7/17/2023    Impression   CONCLUSION:        No cord signal abnormality or evidence of abnormal intrathecal enhancement within the limitations of motion artifact.       Multilevel degenerative changes of the cervical spine, which are most advanced at C3-4 with a disc osteophyte complex and superimposed right paracentral disc extrusion resulting in moderate to severe canal stenosis with the extruded disc causing max  effect on the ventral cord, severe right and moderate left axillary recess stenosis, and severe right and moderate left foraminal narrowing .  The remaining levels are described in detail above.            ASSESSMENT AND PLAN:     Neck pain  May be due to muscle strain, Lower suspicion cervical radiculopathy from cervical spinal stenosis.  Acute on chronic osteoarthritis pain  - On examination there may be some prominent soft tissue along old R cervical chain biopsy site -recommend checking ultrasound of the right neck area  - Cervical spine MRI 7/17: Multilevel degenerative changes of the cervical spine, most advanced at C3-C4 with disc osteophyte complex and superimposed right paracentral disc extrusion.  This is resulting in moderate to severe canal stenosis with extruded disc causing mass effect on the ventral cord  -Acetaminophen 500-650 mg every 4-6 hours as needed for pain relief  -Naproxen 500 mg every 12 hours as needed for anti-inflammatory and pain relief  -For more severe pain, will try cyclobenzaprine 5 mg. Take first at nighttime as this can cause sleepiness, drowsiness.  If tolerated well, can use 3 times a day on an as-needed basis.  Be careful with driving or operating heavy machinery on this medication  -Trial of home stretches and exercises    Muscle cramps  -Continue medical management with the use of muscle relaxers as above.    Excessive Salivation  -Unclear cause, possibly due to medication  side effects  - We will try glycopyrrolate 1 tablet up to 3 times a day as needed.  Should try taking it a time first, monitor for excessive dryness of the mouth.  - There is slight interaction with donepezil, but glycopyrrolate should be okay to use in the short-term..    Orthostatic hypotension  May be related to neuropathy, 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 Martinez.  Possible Florinef/Mestinon incorporation     MGUS  Underwent neurological workup inpatient for hospitalization in 2023.  Noted to have free light chain kappa 3.2, lambda 3.5 with ratio 0.91.  Electrophoresis with IgG lambda monoclonal suspicious for MGUS.  - Established with Dr. Bustamante, last seen 3/8/2024.  Seems to be stable, not contributing to autonomic dysfunction/neuropathy.  Ongoing yearly follow-up.        Psoriasis  - On upadacitinib 15 mg daily -overall well-controlled.     Hyperlipidemia  - Lipid panel with   - Not currently on statin medication     History of colon polyps  -Reported to have 9 polyps on last colonoscopy  - Has colonoscopy scheduled in July.     Chronic low back pain with radiculopathy  History of lumbar microdiscectomy  -Recently seen by Dr. Oviedo 12/26/2023 due to worsening back pain.  Treated with Medrol Dosepak, muscle relaxers, ongoing conservative measures.  -Comanagement with acute neck pain as above    BPH  Seems to be controlled on Flomax  - Check PSA     Vitamin D deficiency  - Will check vitamin D level  - Continue with     Erythroderma  - Seems to be well-controlled, under surveillance by dermatology Dr. Denise Lantigua     Eczema  - Seems to be controlled on CeraVe     Dementia  Suspected Alzheimer's dementia.  Following with neurology.  Workup has included MRI brain without significant acute findings temporal atrophy and  generalized volume loss.  Also had MRI cervical spine with multilevel degenerative images.  Full secondary workup largely unremarkable.  Completed neuropsychological test  -Noted vitamin D deficiency  - Last seen by Dr. Martinez 3/14/2024: MoCA 13/30 with global deficits.  Advised to hold donepezil, memantine to see if sleep improves.  Consideration of sleep study     Neuropathy  Multifactorial due to vitamin B12 deficiency, MGUS, vitamin D deficiency  - Ongoing vitamin B12 and vitamin D supplementation  -Seems to be a prominent symptom but stable for now         Orders This Visit:  No orders of the defined types were placed in this encounter.      Meds This Visit:  Requested Prescriptions      No prescriptions requested or ordered in this encounter       Imaging & Referrals:  PHYSIATRY - INTERNAL     Health Maintenance  Colonoscopy scheduled 7/11/2024 with Dr. Dow    Spent 30 minutes obtaining history, evaluating patient, discussing treatment options, diet, exercise, review of available labs and radiology reports, and completing documentation.       Return to clinic in 1 month as scheduled    Gabriel Rodríguez MD, 04/08/24, 4:04 PM

## 2024-04-08 NOTE — PATIENT INSTRUCTIONS
Seen in clinic today for neck pain.  This pain seems to be closer to the skin and surface area suggestive of muscle irritation and soft tissue irritation rather than arthritis.    We recommended:  -Acetaminophen 500-650 mg every 4-6 hours as needed for pain relief  -Naproxen 500 mg every 12 hours as needed for anti-inflammatory and pain relief  -For more severe pain, will try cyclobenzaprine 5 mg. Take first at nighttime as this can cause sleepiness, drowsiness.  If tolerated well, can use 3 times a day on an as-needed basis.  Be careful with driving or operating heavy machinery on this medication  -Trial of home stretches and exercises    We do recommend getting an ultrasound to the neck and the old biopsy site to ensure there is no abnormal tissue other than scar tissue from the procedure    Pain medications as above as well and stretches can also help with the muscle cramps at nighttime    For the excessive salivation, we recommended:  -Unclear cause, possibly due to medication side effects  - We will try glycopyrrolate 1 tablet up to 3 times a day as needed.  Should try taking it a time first, monitor for excessive dryness of the mouth.  - There is slight interaction with donepezil, but glycopyrrolate should be okay to use in the short-term..    Will follow-up on the results of your colon cancer screening in the summertime

## 2024-04-16 ENCOUNTER — HOSPITAL ENCOUNTER (OUTPATIENT)
Dept: ULTRASOUND IMAGING | Facility: HOSPITAL | Age: 74
Discharge: HOME | End: 2024-04-16
Attending: INTERNAL MEDICINE
Payer: MEDICARE

## 2024-04-16 DIAGNOSIS — R22.1 MASS OF RIGHT SIDE OF NECK: ICD-10-CM

## 2024-04-16 DIAGNOSIS — M54.2 NECK PAIN: ICD-10-CM

## 2024-04-16 PROCEDURE — 76536 US EXAM OF HEAD AND NECK: CPT | Performed by: INTERNAL MEDICINE

## 2024-04-18 ENCOUNTER — TELEPHONE (OUTPATIENT)
Dept: INTERNAL MEDICINE CLINIC | Facility: CLINIC | Age: 74
End: 2024-04-18

## 2024-04-18 NOTE — TELEPHONE ENCOUNTER
Please notify the patient of the ultrasound of the head and neck Did not show any abnormal soft tissue mass or growth.  This is normal tissue, likely prominent from arthritic changes from the neck and upper back.  We will monitor this for now.

## 2024-04-20 ENCOUNTER — HOSPITAL ENCOUNTER (OUTPATIENT)
Age: 74
Discharge: HOME | End: 2024-04-20
Payer: MEDICARE

## 2024-04-20 VITALS
SYSTOLIC BLOOD PRESSURE: 126 MMHG | RESPIRATION RATE: 16 BRPM | TEMPERATURE: 98 F | DIASTOLIC BLOOD PRESSURE: 94 MMHG | HEART RATE: 71 BPM | OXYGEN SATURATION: 98 %

## 2024-04-20 DIAGNOSIS — S61.209A OPEN WOUND OF FINGER, INITIAL ENCOUNTER: Primary | ICD-10-CM

## 2024-04-20 NOTE — ED PROVIDER NOTES
Chief Complaint   Patient presents with    Wound Care     Have what looks lilke a puncture wound on left thumb that has been bleeding on and off for 3 days. - Entered by patient    Skin Problem       HPI:     Ben Choi is a 73 year old male who presents for evaluation of bleeding along the left thumb over the last 3 days.  Denies specific injury or trauma.  States noticed bleeding while laying in bed.  Notes bandage applied with control of bleeding over the last few days yet with removal continues to bleed.  Denies blood thinner history or hematologic issues including thrombocytopenia or anemia.   Tetanus up-to-date,       PFSH    PFS asessment screens reviewed and agree.  Nurses notes reviewed I agree with documentation.    Family History   Problem Relation Age of Onset    Other (Dementia) Mother         Late 70s    Glaucoma Mother     Other (partial colectomy) Mother     Dementia Mother         In later years    Depression Mother     Heart Disorder Father         Bypass surgery    Macular degeneration Neg     Diabetes Neg      Family history reviewed with patient/caregiver and is not pertinent to presenting problem.  Social History     Socioeconomic History    Marital status:      Spouse name: Not on file    Number of children: Not on file    Years of education: Not on file    Highest education level: Not on file   Occupational History    Not on file   Tobacco Use    Smoking status: Former     Current packs/day: 0.00     Types: Cigarettes     Quit date: 1976     Years since quittin.0     Passive exposure: Past    Smokeless tobacco: Never    Tobacco comments:     Long time smoker, finally quit under doctor care   Vaping Use    Vaping status: Never Used   Substance and Sexual Activity    Alcohol use: Not Currently     Comment: Due to medications I have not drank for several years.    Drug use: Never    Sexual activity: Not on file   Other Topics Concern    Caffeine Concern Yes      Comment: soda 4 cans a day    Exercise No    Seat Belt No    Special Diet No    Stress Concern No    Weight Concern No    Grew up on a farm Not Asked    History of tanning Not Asked    Outdoor occupation Not Asked    Pt has a pacemaker No    Pt has a defibrillator No    Reaction to local anesthetic No   Social History Narrative    Not on file     Social Determinants of Health     Financial Resource Strain: Low Risk  (12/26/2023)    [facility]    Overall Financial Resource Strain (CARDIA)     Difficulty of Paying Living Expenses: Not hard at all   Food Insecurity: No Food Insecurity (12/26/2023)    [facility]    Hunger Vital Sign     Worried About Running Out of Food in the Last Year: Never true     Ran Out of Food in the Last Year: Never true   Transportation Needs: No Transportation Needs (12/26/2023)    [facility]    PRAPARE - Transportation     Lack of Transportation (Medical): No     Lack of Transportation (Non-Medical): No   Physical Activity: Not on file   Stress: Not on file   Social Connections: Not on file   Housing Stability: Unknown (6/3/2022)    [facility]    Housing Stability Vital Sign     Unable to Pay for Housing in the Last Year: No     Number of Places Lived in the Last Year: Not on file     In the last 12 months, was there a time when you did not have a steady place to sleep or slept in a shelter (including now)?: No         ROS:   Positive for stated complaint: Thumb bleeding.  All other systems reviewed and negative except as noted above.  Constitutional and Vital Signs Reviewed.      Physical Exam:     Findings:    BP (!) 126/94   Pulse 71   Temp 98.4 °F (36.9 °C) (Temporal)   Resp 16   SpO2 98%   GENERAL: well  developed, well nourished, well hydrated, no distress  SKIN: good skin turgor, no obvious rashes  EXTREMITIES: Superficial wound along the left thumb lateral aspect just proximal to the PIP joint.  Scant capillary hemorrhage without active bleed or pulsatile . no cyanosis or edema. COTTER without difficulty  HEAD: normocephalic, atraumatic  EYES: sclera non icteric bilateral, conjunctiva clear  NEURO: No focal deficits  PSYCH: Alert and oriented x3.  Answering questions appropriately.  Mood appropriate.    MDM/Assessment/Plan:   Orders for this encounter:    No orders of the defined types were placed in this encounter.      Labs performed this visit:  No results found for this or any previous visit (from the past 10 hour(s)).    MDM:  ,sterile filed  Copious irrigation, #1 5-0 nylon simple interrupted suture placed left thumb, no complications, toelrated well. Bandage/bacitracin applied. + hemostasis; Time: 10 min.  Patient CBC from March unremarkable.    Patient instructed on outpatient follow-up as well as wound care, agrees with no oral antibiotics at this time yet to readdress sooner than 1 week for suture removal of any concerns.  Happy with plan of care. Patient  evaluation most suspicious of capillary leak based on open wound.    Diagnosis:    ICD-10-CM    1. Open wound of finger, initial encounter  S61.209A           All results reviewed and discussed with patient.  See AVS for detailed discharge instructions for your condition today.    Follow Up with:  Gabriel Rodríguez MD  [address] [place] [id number]  [phone]    Schedule an appointment as soon as possible for a visit in 1 week  SUTURE REMOVAL

## 2024-04-25 ENCOUNTER — OFFICE VISIT (OUTPATIENT)
Dept: INTERNAL MEDICINE CLINIC | Facility: CLINIC | Age: 74
End: 2024-04-25

## 2024-04-25 VITALS
SYSTOLIC BLOOD PRESSURE: 88 MMHG | HEART RATE: 80 BPM | BODY MASS INDEX: 28.06 KG/M2 | HEIGHT: 70 IN | OXYGEN SATURATION: 95 % | DIASTOLIC BLOOD PRESSURE: 50 MMHG | TEMPERATURE: 98 F | WEIGHT: 196 LBS

## 2024-04-25 DIAGNOSIS — S61.012S THUMB LACERATION, LEFT, SEQUELA: Primary | ICD-10-CM

## 2024-04-25 DIAGNOSIS — Z48.02 ENCOUNTER FOR REMOVAL OF SUTURES: ICD-10-CM

## 2024-04-25 PROBLEM — R50.9 FEBRILE ILLNESS, ACUTE: Status: RESOLVED | Noted: 2023-07-15 | Resolved: 2024-04-25

## 2024-04-25 PROBLEM — R53.1 WEAKNESS GENERALIZED: Status: RESOLVED | Noted: 2023-07-15 | Resolved: 2024-04-25

## 2024-04-25 PROCEDURE — 99213 OFFICE O/P EST LOW 20 MIN: CPT | Performed by: INTERNAL MEDICINE

## 2024-04-25 NOTE — PROGRESS NOTES
Ben Choi is a 73 year old male.  Chief Complaint   Patient presents with    Follow - Up     Patient is here today for an UC f/u. He was seen on 4/20 with a wound to his left thumb. Patient does not know how the wound occurred. 1 stitch was placed. The wound initially stopped bleeding, but started bleeding again today. Patient denies any pain, fevers, or purulent drainage. He has been applying Neosporin once daily.      HPI:     As above.  Wound was doing fine until it started bleeding today in the shower.  Pt held pressure and redressed it.  Bleeding finally stopped.  Feels fine otherwise.  No dizziness          Current Outpatient Medications   Medication Sig Dispense Refill    cyclobenzaprine 5 MG Oral Tab Take 1 tablet (5 mg total) by mouth 3 (three) times daily as needed. 60 tablet 1    glycopyrrolate 1 MG Oral Tab Take 1 tablet (1 mg total) by mouth 3 (three) times daily as needed. 30 tablet 0    polyethylene glycol, PEG 3350-KCl-NaBcb-NaCl-NaSulf, 236 g Oral Recon Soln Take 4,000 mL by mouth As Directed. Take 2,000 mL the night before your procedure and 2,000 mL the morning of your procedure. Take as directed by GI clinic. Okay to substitute for generic. 1 each 0    Multiple Vitamins-Minerals (CENTRUM SILVER 50+MEN) Oral Tab       Upadacitinib ER (RINVOQ) 15 MG Oral Tablet 24 Hr       naproxen 500 MG Oral Tab Take 1 tablet (500 mg total) by mouth 2 (two) times daily with meals.      melatonin 5 MG Oral Cap Take 1 capsule (5 mg total) by mouth nightly.      midodrine 5 MG Oral Tab Take 1 tablet (5 mg total) by mouth in the morning and 1 tablet (5 mg total) at noon and 1 tablet (5 mg total) in the evening.      vitamin E 100 UNITS Oral Cap Take 1 capsule (100 Units total) by mouth daily.      Cyanocobalamin (VITAMIN B12) 500 MCG Oral Tab Take 500 mcg by mouth daily.      donepezil 10 MG Oral Tab Take 1 tablet (10 mg total) by mouth nightly. 90 tablet 3    memantine 5 MG Oral Tab Take 1 tablet (5 mg  total) by mouth daily. 90 tablet 3    acetaminophen 500 MG Oral Tab Take 1 tablet (500 mg total) by mouth every 4 (four) hours as needed for Fever (equal to or greater than 100.4).  0    sennosides 17.2 MG Oral Tab Take 1 tablet (17.2 mg total) by mouth nightly as needed (constipation, as needed if no bowel movement that day).  0    tamsulosin (FLOMAX) cap TAKE 30 MINUTES AFTER A MEAL AT THE SAME TIME EVERY DAY      Emollient (CERAVE) External Cream Apply topically daily.        Past Medical History:    Allergic rhinitis    Hayfever    Arthritis    Work related    Back problem    BPH (benign prostatic hyperplasia)    Depression    Eczema    Hypotension    orthostatic    Lumbar disc herniation with radiculopathy    Right leg weakness    Visual impairment    glasses      Social History:  Social History     Socioeconomic History    Marital status:    Tobacco Use    Smoking status: Former     Current packs/day: 0.00     Types: Cigarettes     Quit date: 1976     Years since quittin.0     Passive exposure: Past    Smokeless tobacco: Never    Tobacco comments:     Long time smoker, finally quit under doctor care   Vaping Use    Vaping status: Never Used   Substance and Sexual Activity    Alcohol use: Not Currently     Comment: Due to medications I have not drank for several years.    Drug use: Never   Other Topics Concern    Caffeine Concern Yes     Comment: soda 4 cans a day    Exercise No    Seat Belt No    Special Diet No    Stress Concern No    Weight Concern No    Pt has a pacemaker No    Pt has a defibrillator No    Reaction to local anesthetic No     Social Determinants of Health     Financial Resource Strain: Low Risk  (2023)    Received from [facility], [facility]    Overall Financial Resource Strain (CARDIA)     Difficulty of Paying Living Expenses: Not hard at all   Food Insecurity: No Food Insecurity (2023)    Received from [place]  [facility], [facility]    Hunger Vital Sign     Worried About Running Out of Food in the Last Year: Never true     Ran Out of Food in the Last Year: Never true   Transportation Needs: No Transportation Needs (12/26/2023)    Received from [facility], [facility]    PRAPARE - Transportation     Lack of Transportation (Medical): No     Lack of Transportation (Non-Medical): No   Housing Stability: Unknown (6/3/2022)    Received from [facility], [facility]    Housing Stability Vital Sign     Unable to Pay for Housing in the Last Year: No     In the last 12 months, was there a time when you did not have a steady place to sleep or slept in a shelter (including now)?: No        REVIEW OF SYSTEMS:   GENERAL HEALTH: feels well otherwise  RESPIRATORY: no SOB  CARDIOVASCULAR: no chest pain/pressure  GI: no nausea, vomiting, diarrhea    Wt Readings from Last 5 Encounters:   04/25/24 196 lb (88.9 kg)   04/08/24 193 lb 6.4 oz (87.7 kg)   03/18/24 195 lb (88.5 kg)   03/14/24 195 lb (88.5 kg)   03/08/24 195 lb (88.5 kg)     Body mass index is 28.12 kg/m².      EXAM:   BP (!) 88/50 (BP Location: Right arm, Patient Position: Sitting, Cuff Size: large)   Pulse 80   Temp 98 °F (36.7 °C) (Oral)   Ht 5' 10\" (1.778 m)   Wt 196 lb (88.9 kg)   SpO2 95%   BMI 28.12 kg/m²   GENERAL: well developed, well nourished, in no apparent distress    EXTREMITIES: +small laceration palmar aspect of left thumb - healing well; no bleeding; no exudate.  One loosely placed suture    ASSESSMENT AND PLAN:     Left thumb laceration  -not currently bleeding; pt not on blood thinners  -wound clean; edges approximated and healing well  -suture was loosely placed and not providing any benefit to keeping wound closed, so it was removed  -steri-strips placed and overlying bandage was placed over that  -UTD on tetanus vax 10/5/20    The  patient indicates understanding of these issues and agrees to the plan.    Marcia Byrnes MD, 04/25/24, 1:43 PM

## 2024-04-29 ENCOUNTER — HOSPITAL ENCOUNTER (OUTPATIENT)
Age: 74
Discharge: HOME | End: 2024-04-29
Payer: MEDICARE

## 2024-04-29 VITALS
SYSTOLIC BLOOD PRESSURE: 115 MMHG | RESPIRATION RATE: 20 BRPM | TEMPERATURE: 99 F | HEART RATE: 73 BPM | OXYGEN SATURATION: 98 % | DIASTOLIC BLOOD PRESSURE: 63 MMHG

## 2024-04-29 DIAGNOSIS — T14.8XXD DELAYED WOUND HEALING: Primary | ICD-10-CM

## 2024-04-29 PROCEDURE — 99212 OFFICE O/P EST SF 10 MIN: CPT | Performed by: NURSE PRACTITIONER

## 2024-04-29 NOTE — ED INITIAL ASSESSMENT (HPI)
Pt had one stitch placed on the left thumb on the 20th, pt states the area was bleeding and went to see pmd on the 25th and the stitch was taken out. This morning pt stated that it started bleeding.  +pea sized wound on the left thumb.

## 2024-04-29 NOTE — DISCHARGE INSTRUCTIONS
Please keep the area clean and dry.  I would like you to have a wound check in 2 days.  Any surrounding redness, drainage, fever please follow-up immediately.

## 2024-04-29 NOTE — ED PROVIDER NOTES
Patient Seen in: [facility]      History     Chief Complaint   Patient presents with    Finger Injury     Stated Complaint: lft thumb injury with bleeding    Subjective:   HPI    This is a well-appearing 73-year-old who presents for wound check.  Patient had a suture repair to the left thumb on  here at the immediate care.  Patient states  the wound started bleeding and followed up with primary care.  The suture was removed.  Patient states that looked like it was healing well until this morning when he started having bleeding from the site.  No fever or chills.  No numbness or tingling.  Up-to-date with tetanus.    Objective:   Past Medical History:    Allergic rhinitis    Hayfever    Arthritis    Work related    Back problem    BPH (benign prostatic hyperplasia)    Depression    Eczema    Hypotension    orthostatic    Lumbar disc herniation with radiculopathy    Right leg weakness    Visual impairment    glasses              Past Surgical History:   Procedure Laterality Date    Back surgery  2/15/2021    Clean out disc    Cataract      Cataract extraction w/  intraocular lens implant Left 2021    PC IOL topical IV sedation- Dr. Barriga @ [facility]    Cataract extraction w/  intraocular lens implant Right 10/19/2021    Dr. Barriga    Cataract extraction w/ intraocular lens implant Left 2021    Dr. Barriga     Cholecystectomy      Colonoscopy      Colonoscopy      Other  2021    left eye     Other  10/2021    right eye    Spine surgery procedure unlisted  02/15/2021    LEFT LUMBAR 4 -LUMBAR 5 MICRODISCECTOMY per Dr. Hernandez    Tonsillectomy  As a child                Social History     Socioeconomic History    Marital status:    Tobacco Use    Smoking status: Former     Current packs/day: 0.00     Types: Cigarettes     Quit date: 1976     Years since quittin.1     Passive exposure: Past    Smokeless tobacco: Never    Tobacco comments:     Long time smoker, finally  quit under doctor care   Vaping Use    Vaping status: Never Used   Substance and Sexual Activity    Alcohol use: Not Currently     Comment: Due to medications I have not drank for several years.    Drug use: Never   Other Topics Concern    Caffeine Concern Yes     Comment: soda 4 cans a day    Exercise No    Seat Belt No    Special Diet No    Stress Concern No    Weight Concern No    Pt has a pacemaker No    Pt has a defibrillator No    Reaction to local anesthetic No     Social Determinants of Health     Financial Resource Strain: Low Risk  (12/26/2023)    Received from [facility], [facility]    Overall Financial Resource Strain (CARDIA)     Difficulty of Paying Living Expenses: Not hard at all   Food Insecurity: No Food Insecurity (12/26/2023)    Received from [facility], [facility]    Hunger Vital Sign     Worried About Running Out of Food in the Last Year: Never true     Ran Out of Food in the Last Year: Never true   Transportation Needs: No Transportation Needs (12/26/2023)    Received from [facility], [facility]    PRAPARE - Transportation     Lack of Transportation (Medical): No     Lack of Transportation (Non-Medical): No   Housing Stability: Unknown (6/3/2022)    Received from [facility], [facility]    Housing Stability Vital Sign     Unable to Pay for Housing in the Last Year: No     In the last 12 months, was there a time when you did not have a steady place to sleep or slept in a shelter (including now)?: No              Review of Systems   Skin:  Positive for wound.   All other systems reviewed and are negative.      Positive for stated complaint: lft thumb injury with bleeding  Other systems are as noted in HPI.  Constitutional and vital signs reviewed.      All other systems reviewed and negative except as noted  above.    Physical Exam     ED Triage Vitals [04/29/24 0812]   /63   Pulse 73   Resp 20   Temp 98.7 °F (37.1 °C)   Temp src Temporal   SpO2 98 %   O2 Device        Current:/63   Pulse 73   Temp 98.7 °F (37.1 °C) (Temporal)   Resp 20   SpO2 98%         Physical Exam  Vitals and nursing note reviewed.   Constitutional:       General: He is awake. He is not in acute distress.     Appearance: Normal appearance. He is not ill-appearing, toxic-appearing or diaphoretic.   HENT:      Head: Normocephalic and atraumatic.      Right Ear: Tympanic membrane, ear canal and external ear normal.      Left Ear: Tympanic membrane, ear canal and external ear normal.      Nose: Nose normal.      Mouth/Throat:      Mouth: Mucous membranes are moist.      Pharynx: Oropharynx is clear. Uvula midline.   Eyes:      General: Lids are normal.      Extraocular Movements: Extraocular movements intact.      Conjunctiva/sclera: Conjunctivae normal.      Pupils: Pupils are equal, round, and reactive to light.   Cardiovascular:      Rate and Rhythm: Normal rate and regular rhythm.      Pulses: Normal pulses.      Heart sounds: Normal heart sounds.   Pulmonary:      Effort: Pulmonary effort is normal.      Breath sounds: Normal breath sounds and air entry.   Musculoskeletal:      Comments: There is no erythema, no drainage. Appears to have mild hypergranulation tissue. Normal flexion and extension of the digit. Cap refill <3 seconds.    Skin:     General: Skin is warm and dry.      Capillary Refill: Capillary refill takes less than 2 seconds.   Neurological:      General: No focal deficit present.      Mental Status: He is alert and oriented to person, place, and time.   Psychiatric:         Mood and Affect: Mood normal.         Behavior: Behavior normal. Behavior is cooperative.         Thought Content: Thought content normal.         Judgment: Judgment normal.               ED Course   Labs Reviewed - No data to display  Dressing  applied.  MDM     Medical Decision Making  Patient is well-appearing on exam, nontoxic appearance, exam as noted above for differential diagnose include not limited to cellulitis, delayed wound healing, hypergranulation tissue.  Discussed with the patient wound check in 2 days.  Dressing applied.  No clear indication for antibiotics at this does not appear to be infected.  Extremity neurovascular intact at discharge.  Patient verbalized plan of care and states understanding    Amount and/or Complexity of Data Reviewed  ECG/medicine tests: ordered and independent interpretation performed. Decision-making details documented in ED Course.    Risk  OTC drugs.        Disposition and Plan     Clinical Impression:  1. Delayed wound healing         Disposition:  Discharge  4/29/2024  8:41 am    Follow-up:  Gabriel Rodríugez MD  [address] [place] [id number]  [phone]                Medications Prescribed:  Discharge Medication List as of 4/29/2024  8:46 AM

## 2024-05-01 ENCOUNTER — OFFICE VISIT (OUTPATIENT)
Dept: INTERNAL MEDICINE CLINIC | Facility: CLINIC | Age: 74
End: 2024-05-01

## 2024-05-01 ENCOUNTER — TELEPHONE (OUTPATIENT)
Dept: SURGERY | Facility: CLINIC | Age: 74
End: 2024-05-01

## 2024-05-01 VITALS
HEART RATE: 96 BPM | DIASTOLIC BLOOD PRESSURE: 56 MMHG | BODY MASS INDEX: 28.49 KG/M2 | OXYGEN SATURATION: 74 % | SYSTOLIC BLOOD PRESSURE: 102 MMHG | TEMPERATURE: 99 F | HEIGHT: 70 IN | WEIGHT: 199 LBS

## 2024-05-01 DIAGNOSIS — T14.8XXA NONHEALING NONSURGICAL WOUND: ICD-10-CM

## 2024-05-01 DIAGNOSIS — S61.012S THUMB LACERATION, LEFT, SEQUELA: Primary | ICD-10-CM

## 2024-05-01 PROCEDURE — 99213 OFFICE O/P EST LOW 20 MIN: CPT | Performed by: INTERNAL MEDICINE

## 2024-05-01 NOTE — PROGRESS NOTES
Ben Choi is a 73 year old male.  Chief Complaint   Patient presents with    Checkup     Wound care - sutures removed 4/25, UC visit on 4/29 (bleeding), starts to bleed anytime they have changed the dressing      HPI:     Follow up of left thumb wound    -Pt somehow sustained an injury/laceration to his left thumb -- does not recall a specific injury.    -Seen in UC on 4/20/24 -- 1 suture placed  -Wound began bleeding on 4/25 -- seen in office -- the suture was only loosely in place, not providing any wound edge approximation, so it was removed at that time.  No active bleeding.  Wound was dressed.  -wound began bleeding again on 4/29 -- went back to .  No sx of infection. Wound was redressed.    Here for follow up.  Pt has kept the wound covered each day.  When he removes the dressing, the wound again starts to ooze.    He is not on any blood thinners.  Previously on naprosyn which he has since stopped.            Current Outpatient Medications   Medication Sig Dispense Refill    cyclobenzaprine 5 MG Oral Tab Take 1 tablet (5 mg total) by mouth 3 (three) times daily as needed. 60 tablet 1    glycopyrrolate 1 MG Oral Tab Take 1 tablet (1 mg total) by mouth 3 (three) times daily as needed. 30 tablet 0    polyethylene glycol, PEG 3350-KCl-NaBcb-NaCl-NaSulf, 236 g Oral Recon Soln Take 4,000 mL by mouth As Directed. Take 2,000 mL the night before your procedure and 2,000 mL the morning of your procedure. Take as directed by GI clinic. Okay to substitute for generic. 1 each 0    Multiple Vitamins-Minerals (CENTRUM SILVER 50+MEN) Oral Tab       Upadacitinib ER (RINVOQ) 15 MG Oral Tablet 24 Hr       melatonin 5 MG Oral Cap Take 1 capsule (5 mg total) by mouth nightly.      midodrine 5 MG Oral Tab Take 1 tablet (5 mg total) by mouth in the morning and 1 tablet (5 mg total) at noon and 1 tablet (5 mg total) in the evening.      vitamin E 100 UNITS Oral Cap Take 1 capsule (100 Units total) by mouth daily.       Cyanocobalamin (VITAMIN B12) 500 MCG Oral Tab Take 500 mcg by mouth daily.      donepezil 10 MG Oral Tab Take 1 tablet (10 mg total) by mouth nightly. 90 tablet 3    memantine 5 MG Oral Tab Take 1 tablet (5 mg total) by mouth daily. 90 tablet 3    acetaminophen 500 MG Oral Tab Take 1 tablet (500 mg total) by mouth every 4 (four) hours as needed for Fever (equal to or greater than 100.4).  0    sennosides 17.2 MG Oral Tab Take 1 tablet (17.2 mg total) by mouth nightly as needed (constipation, as needed if no bowel movement that day).  0    tamsulosin (FLOMAX) cap TAKE 30 MINUTES AFTER A MEAL AT THE SAME TIME EVERY DAY      Emollient (CERAVE) External Cream Apply topically daily.      naproxen 500 MG Oral Tab Take 1 tablet (500 mg total) by mouth 2 (two) times daily with meals. (Patient not taking: Reported on 2024)        Past Medical History:    Allergic rhinitis    Hayfever    Arthritis    Work related    Back problem    BPH (benign prostatic hyperplasia)    Depression    Eczema    Hypotension    orthostatic    Lumbar disc herniation with radiculopathy    Right leg weakness    Visual impairment    glasses      Social History:  Social History     Socioeconomic History    Marital status:    Tobacco Use    Smoking status: Former     Current packs/day: 0.00     Types: Cigarettes     Quit date: 1976     Years since quittin.1     Passive exposure: Past    Smokeless tobacco: Never    Tobacco comments:     Long time smoker, finally quit under doctor care   Vaping Use    Vaping status: Never Used   Substance and Sexual Activity    Alcohol use: Not Currently     Comment: Due to medications I have not drank for several years.    Drug use: Never   Other Topics Concern    Caffeine Concern Yes     Comment: soda 4 cans a day    Exercise No    Seat Belt No    Special Diet No    Stress Concern No    Weight Concern No    Pt has a pacemaker No    Pt has a defibrillator No    Reaction to local anesthetic No      Social Determinants of Health     Financial Resource Strain: Low Risk  (12/26/2023)    Received from [facility], [facility]    Overall Financial Resource Strain (CARDIA)     Difficulty of Paying Living Expenses: Not hard at all   Food Insecurity: No Food Insecurity (12/26/2023)    Received from [facility], [facility]    Hunger Vital Sign     Worried About Running Out of Food in the Last Year: Never true     Ran Out of Food in the Last Year: Never true   Transportation Needs: No Transportation Needs (12/26/2023)    Received from [facility], [facility]    PRAPARE - Transportation     Lack of Transportation (Medical): No     Lack of Transportation (Non-Medical): No   Housing Stability: Unknown (6/3/2022)    Received from [facility], [facility]    Housing Stability Vital Sign     Unable to Pay for Housing in the Last Year: No     In the last 12 months, was there a time when you did not have a steady place to sleep or slept in a shelter (including now)?: No        REVIEW OF SYSTEMS:   GENERAL HEALTH: feels well otherwise  RESPIRATORY: no SOB  CARDIOVASCULAR: no chest pain/pressure  GI: no nausea, vomiting, diarrhea    Wt Readings from Last 5 Encounters:   05/01/24 199 lb (90.3 kg)   04/25/24 196 lb (88.9 kg)   04/08/24 193 lb 6.4 oz (87.7 kg)   03/18/24 195 lb (88.5 kg)   03/14/24 195 lb (88.5 kg)     Body mass index is 28.55 kg/m².      EXAM:   /56   Pulse 96   Temp 99.3 °F (37.4 °C) (Tympanic)   Ht 5' 10\" (1.778 m)   Wt 199 lb (90.3 kg)   SpO2 (!) 74%   BMI 28.55 kg/m²   GENERAL: well developed, well nourished, in no apparent distress    EXTREMITIES: small, approx 6-7mm raised wound on left thumb; +granulation tissue, with small amt of blood oozing.  No exudate, no surrounding erythema, no fluctuance    ASSESSMENT AND PLAN:      Left thumb laceration  -slow to heal; no sx of infection; still with recurrent bleeding despite not being on blood thinners.  Unclear why -- retained foreign body?   -wound appears clean; no sx of infection  -given poor healing, will refer to plastics/hand surgeon Dr. Gold  -wound cleaned and redressed with telfa and gauze    The patient indicates understanding of these issues and agrees to the plan.    Marcia Byrnes MD, 04/25/24, 1:43 PM

## 2024-05-01 NOTE — TELEPHONE ENCOUNTER
PT called to schedule appt. Referred by Dr. Byrnes for open wound on left thumb that will not heal and still bleeds. Please contact patient at [phone].

## 2024-05-06 ENCOUNTER — OFFICE VISIT (OUTPATIENT)
Dept: SURGERY | Facility: CLINIC | Age: 74
End: 2024-05-06
Payer: MEDICARE

## 2024-05-06 DIAGNOSIS — L98.0 PYOGENIC GRANULOMA OF SKIN: Primary | ICD-10-CM

## 2024-05-06 PROCEDURE — 99204 OFFICE O/P NEW MOD 45 MIN: CPT | Performed by: PLASTIC SURGERY

## 2024-05-06 RX ORDER — HYDROCODONE BITARTRATE AND ACETAMINOPHEN 7.5; 325 MG/1; MG/1
1 TABLET ORAL
Qty: 10 TABLET | Refills: 0 | Status: SHIPPED | OUTPATIENT
Start: 2024-05-06

## 2024-05-06 NOTE — PROGRESS NOTES
Patient request for surgery signed by patient and witnessed and signed by RN.  Prescription for Norco 7.5 mg electronically sent to pharmacy per Dr. Gold's order and patient instructed to  prescription before surgery.   Pre-Surgical Instruction Handout, Hand Elevation Handout and Post-Operative Instruction Handout given to and reviewed w/patient.  All questions and concerns answered; pt verbalized an understanding of all pre-operative teaching.  Patient instructed to call the office with any further questions and/or concerns.  Patient escorted to surgery scheduling to schedule surgery and post-operative appointments.

## 2024-05-06 NOTE — H&P (VIEW-ONLY)
Injury 1: L TH laceration  - Date: 04/19/24  - Days Since: 17    Ben Choi is a 73 year old male that presents with   Chief Complaint   Patient presents with    Laceration     L TH laceration   .    REFERRED BY:  Marcia Byrnes    Pacemaker: No  Latex Allergy: no  Coumadin: No  Plavix: No  Other anticoagulants: No  Diet medication: No  Cardiac stents: No    HAND DOMINANCE:  Right    Profession: Retired    RECONSTRUCTIVE HISTORY    SUN EXPOSURE   Current no   Past no   Sunburns no   Tanning salons current no   Tanning salons past no     SKIN CANCER    Personal history of skin cancer: none      HPI:       Injury 1: L TH laceration  - Date: 04/19/24  - Days Since: 17      73-year-old male right-hand-dominant with a left thumb wound    Unknown injury but awoke that morning with bleeding    Has been to immediate care twice for bleeding    Mild discomfort                Review of Systems:   Constitutional: No change in appetite, chill/rigors, or fatigue  GI: No jaundice  Endocrine: No generalized weakness  Neurological: No aphasia, loss of consciousness, or seizures    Musculoskeletal:      Date of injury 4/19/24     Location left      thumb      Mechanism Injury unknown, woke up in AM w/ bleeding     Treatment Seen in immediate care 4/20 rinsed w/ saline, sutured, dressed  4/25 saw PCP for bleeding, suture removed, re-dressed, 4/29 seen in IC again for bleeding     Pain  yes mild discomfort, rates 1/10     Numbness None    Pt w/ L TH laceration that oozes despite not taking blood thinners, bleeds when bumped per pt    Local care: washing daily soap and water, telfa, gauze, tape    L TH DIP radial open sore/mass      PMH:     MEDICAL  Past Medical History:    Allergic rhinitis    Hayfever    Arthritis    Work related    Back problem    BPH (benign prostatic hyperplasia)    Depression    Eczema    Hypotension    orthostatic    Lumbar disc herniation with radiculopathy    Right leg weakness    Visual impairment     glasses        SURGICAL  Past Surgical History:   Procedure Laterality Date    Back surgery  2/15/2021    Clean out disc    Cataract  2021    Cataract extraction w/  intraocular lens implant Left 11/16/2021    PC IOL topical IV sedation- Dr. Barriga @ [facility]    Cataract extraction w/  intraocular lens implant Right 10/19/2021    Dr. Barriga    Cataract extraction w/ intraocular lens implant Left 11/2021    Dr. Barriga     Cholecystectomy      Colonoscopy      Colonoscopy  April, 2021    Other  11/2021    left eye     Other  10/2021    right eye    Spine surgery procedure unlisted  02/15/2021    LEFT LUMBAR 4 -LUMBAR 5 MICRODISCECTOMY per Dr. Hernandez    Tonsillectomy  As a child        ALLERIGIES  Allergies   Allergen Reactions    Dupilumab SWELLING    Mildew OTHER (SEE COMMENTS)     Sneezing    Mold OTHER (SEE COMMENTS)     Sneezing        MEDICATIONS  Current Outpatient Medications   Medication Sig Dispense Refill    glycopyrrolate 1 MG Oral Tab Take 1 tablet (1 mg total) by mouth 3 (three) times daily as needed. 30 tablet 0    Multiple Vitamins-Minerals (CENTRUM SILVER 50+MEN) Oral Tab       Upadacitinib ER (RINVOQ) 15 MG Oral Tablet 24 Hr       midodrine 5 MG Oral Tab Take 1 tablet (5 mg total) by mouth in the morning and 1 tablet (5 mg total) at noon and 1 tablet (5 mg total) in the evening.      vitamin E 100 UNITS Oral Cap Take 1 capsule (100 Units total) by mouth daily.      Cyanocobalamin (VITAMIN B12) 500 MCG Oral Tab Take 500 mcg by mouth daily.      donepezil 10 MG Oral Tab Take 1 tablet (10 mg total) by mouth nightly. 90 tablet 3    memantine 5 MG Oral Tab Take 1 tablet (5 mg total) by mouth daily. 90 tablet 3    acetaminophen 500 MG Oral Tab Take 1 tablet (500 mg total) by mouth every 4 (four) hours as needed for Fever (equal to or greater than 100.4).  0    tamsulosin (FLOMAX) cap TAKE 30 MINUTES AFTER A MEAL AT THE SAME TIME EVERY DAY      cyclobenzaprine 5 MG Oral Tab Take 1 tablet (5 mg total) by mouth 3 (three)  times daily as needed. (Patient not taking: Reported on 2024) 60 tablet 1    polyethylene glycol, PEG 3350-KCl-NaBcb-NaCl-NaSulf, 236 g Oral Recon Soln Take 4,000 mL by mouth As Directed. Take 2,000 mL the night before your procedure and 2,000 mL the morning of your procedure. Take as directed by GI clinic. Okay to substitute for generic. (Patient not taking: Reported on 2024) 1 each 0    melatonin 5 MG Oral Cap Take 1 capsule (5 mg total) by mouth nightly. (Patient not taking: Reported on 2024)      sennosides 17.2 MG Oral Tab Take 1 tablet (17.2 mg total) by mouth nightly as needed (constipation, as needed if no bowel movement that day). (Patient not taking: Reported on 2024)  0    Emollient (CERAVE) External Cream Apply topically daily. (Patient not taking: Reported on 2024)          SOCIAL HISTORY  Social History     Socioeconomic History    Marital status:    Tobacco Use    Smoking status: Former     Current packs/day: 0.00     Types: Cigarettes     Quit date: 1976     Years since quittin.1     Passive exposure: Past    Smokeless tobacco: Never    Tobacco comments:     Long time smoker, finally quit under doctor care   Vaping Use    Vaping status: Never Used   Substance and Sexual Activity    Alcohol use: Not Currently     Comment: Due to medications I have not drank for several years.    Drug use: Never   Other Topics Concern    Caffeine Concern Yes     Comment: soda 4 cans a day    Exercise No    Seat Belt No    Special Diet No    Stress Concern No    Weight Concern No    Pt has a pacemaker No    Pt has a defibrillator No    Reaction to local anesthetic No     Social Determinants of Health     Financial Resource Strain: Low Risk  (2023)    [place] [facility] [place] [place]    Overall Financial Resource Strain (CARDIA)     Difficulty of Paying Living Expenses: Not hard at all   Food Insecurity: No Food Insecurity  (12/26/2023)    [facility]    Hunger Vital Sign     Worried About Running Out of Food in the Last Year: Never true     Ran Out of Food in the Last Year: Never true   Transportation Needs: No Transportation Needs (12/26/2023)    [facility]    PRAPARE - Transportation     Lack of Transportation (Medical): No     Lack of Transportation (Non-Medical): No   Housing Stability: Unknown (6/3/2022)    [facility]    Housing Stability Vital Sign     Unable to Pay for Housing in the Last Year: No     In the last 12 months, was there a time when you did not have a steady place to sleep or slept in a shelter (including now)?: No        FAMILY HISTORY  Family History   Problem Relation Age of Onset    Other (Dementia) Mother         Late 70s    Glaucoma Mother     Other (partial colectomy) Mother     Dementia Mother         In later years    Depression Mother     Heart Disorder Father         Bypass surgery    Macular degeneration Neg     Diabetes Neg           PHYSICAL EXAM:     CONSTITUTIONAL: Overall appearance - Normal  HEENT: Normocephalic  EYES: Conjunctiva - Right: Normal, Left: Normal; EOMI  EARS: Inspection - Right: Normal, Left: Normal  NECK/THYROID: Inspection - Normal, Palpation - Normal, Thyroid gland - Normal, No adenopathy  RESPIRATORY: Inspection - Normal, Effort - Normal  CARDIOVASCULAR: Regular rhythm, No murmurs  ABDOMEN: Inspection - Normal, No abdominal tenderness  NEURO: Memory intact  PSYCH: Oriented to person, place, time, and situation, Appropriate mood and affect      Hand Physical Exam:     Left thumb radial IP 7 mm pyogenic granuloma with minimal lax skin        ASSESSMENT/PLAN:     IMPRESSION:    PYOGENIC GRANULOMA      We had a long discussion.  I explained to the patient the nature of this  lesion / these lesions, as well as treatment options.    WIDE EXCISION: treatment requires wide excision.  A scar will result, which will be permanent.  The scar will be considerably longer than the size of the lesion, as we need to excise margins around the tumor.  Further excision may be necessary, if tumor is present microscopically on the pathology report.  This may result in a longer scar.  Recurrence may occur, which will require further surgery.        TREATMENT:    Questions were answered and the patient wishes to proceed with treatment.    Wide excision  Flap local transposition    RISKS:     Bleeding  Infection  Loss of flap  Scar  Pain  Stiffness  Weakness  Loss of function  Anesthesia risks      [place]  Data Reviewed.             +++++++++++++++++++++++++++++++++++++++++++++++++    MEDICAL DECISION MAKING    PROBLEMS      MODERATE    (number / complexity)          Undiagnosed new problem with uncertain prognosis    DATA         STRAIGHTFORWARD    (amount / complexity)           MANAGEMENT RISK  HIGH    (complications/ morbidity)       Major surgery with risk factors                  MDM LEVEL    MODERATE      5/6/2024  Vasile Gold MD

## 2024-05-06 NOTE — H&P
Injury 1: L TH laceration  - Date: 04/19/24  - Days Since: 17    Ben Choi is a 73 year old male that presents with   Chief Complaint   Patient presents with    Laceration     L TH laceration   .    REFERRED BY:  Marcia Byrnes    Pacemaker: No  Latex Allergy: no  Coumadin: No  Plavix: No  Other anticoagulants: No  Diet medication: No  Cardiac stents: No    HAND DOMINANCE:  Right    Profession: Retired    RECONSTRUCTIVE HISTORY    SUN EXPOSURE   Current no   Past no   Sunburns no   Tanning salons current no   Tanning salons past no     SKIN CANCER    Personal history of skin cancer: none      HPI:       Injury 1: L TH laceration  - Date: 04/19/24  - Days Since: 17      73-year-old male right-hand-dominant with a left thumb wound    Unknown injury but awoke that morning with bleeding    Has been to immediate care twice for bleeding    Mild discomfort                Review of Systems:   Constitutional: No change in appetite, chill/rigors, or fatigue  GI: No jaundice  Endocrine: No generalized weakness  Neurological: No aphasia, loss of consciousness, or seizures    Musculoskeletal:      Date of injury 4/19/24     Location left      thumb      Mechanism Injury unknown, woke up in AM w/ bleeding     Treatment Seen in immediate care 4/20 rinsed w/ saline, sutured, dressed  4/25 saw PCP for bleeding, suture removed, re-dressed, 4/29 seen in IC again for bleeding     Pain  yes mild discomfort, rates 1/10     Numbness None    Pt w/ L TH laceration that oozes despite not taking blood thinners, bleeds when bumped per pt    Local care: washing daily soap and water, telfa, gauze, tape    L TH DIP radial open sore/mass      PMH:     MEDICAL  Past Medical History:    Allergic rhinitis    Hayfever    Arthritis    Work related    Back problem    BPH (benign prostatic hyperplasia)    Depression    Eczema    Hypotension    orthostatic    Lumbar disc herniation with radiculopathy    Right leg weakness    Visual impairment     glasses        SURGICAL  Past Surgical History:   Procedure Laterality Date    Back surgery  2/15/2021    Clean out disc    Cataract  2021    Cataract extraction w/  intraocular lens implant Left 11/16/2021    PC IOL topical IV sedation- Dr. Barriga @ [facility]    Cataract extraction w/  intraocular lens implant Right 10/19/2021    Dr. Barriga    Cataract extraction w/ intraocular lens implant Left 11/2021    Dr. Barriga     Cholecystectomy      Colonoscopy      Colonoscopy  April, 2021    Other  11/2021    left eye     Other  10/2021    right eye    Spine surgery procedure unlisted  02/15/2021    LEFT LUMBAR 4 -LUMBAR 5 MICRODISCECTOMY per Dr. Hernandez    Tonsillectomy  As a child        ALLERIGIES  Allergies   Allergen Reactions    Dupilumab SWELLING    Mildew OTHER (SEE COMMENTS)     Sneezing    Mold OTHER (SEE COMMENTS)     Sneezing        MEDICATIONS  Current Outpatient Medications   Medication Sig Dispense Refill    glycopyrrolate 1 MG Oral Tab Take 1 tablet (1 mg total) by mouth 3 (three) times daily as needed. 30 tablet 0    Multiple Vitamins-Minerals (CENTRUM SILVER 50+MEN) Oral Tab       Upadacitinib ER (RINVOQ) 15 MG Oral Tablet 24 Hr       midodrine 5 MG Oral Tab Take 1 tablet (5 mg total) by mouth in the morning and 1 tablet (5 mg total) at noon and 1 tablet (5 mg total) in the evening.      vitamin E 100 UNITS Oral Cap Take 1 capsule (100 Units total) by mouth daily.      Cyanocobalamin (VITAMIN B12) 500 MCG Oral Tab Take 500 mcg by mouth daily.      donepezil 10 MG Oral Tab Take 1 tablet (10 mg total) by mouth nightly. 90 tablet 3    memantine 5 MG Oral Tab Take 1 tablet (5 mg total) by mouth daily. 90 tablet 3    acetaminophen 500 MG Oral Tab Take 1 tablet (500 mg total) by mouth every 4 (four) hours as needed for Fever (equal to or greater than 100.4).  0    tamsulosin (FLOMAX) cap TAKE 30 MINUTES AFTER A MEAL AT THE SAME TIME EVERY DAY      cyclobenzaprine 5 MG Oral Tab Take 1 tablet (5 mg total) by mouth 3 (three)  times daily as needed. (Patient not taking: Reported on 2024) 60 tablet 1    polyethylene glycol, PEG 3350-KCl-NaBcb-NaCl-NaSulf, 236 g Oral Recon Soln Take 4,000 mL by mouth As Directed. Take 2,000 mL the night before your procedure and 2,000 mL the morning of your procedure. Take as directed by GI clinic. Okay to substitute for generic. (Patient not taking: Reported on 2024) 1 each 0    melatonin 5 MG Oral Cap Take 1 capsule (5 mg total) by mouth nightly. (Patient not taking: Reported on 2024)      sennosides 17.2 MG Oral Tab Take 1 tablet (17.2 mg total) by mouth nightly as needed (constipation, as needed if no bowel movement that day). (Patient not taking: Reported on 2024)  0    Emollient (CERAVE) External Cream Apply topically daily. (Patient not taking: Reported on 2024)          SOCIAL HISTORY  Social History     Socioeconomic History    Marital status:    Tobacco Use    Smoking status: Former     Current packs/day: 0.00     Types: Cigarettes     Quit date: 1976     Years since quittin.1     Passive exposure: Past    Smokeless tobacco: Never    Tobacco comments:     Long time smoker, finally quit under doctor care   Vaping Use    Vaping status: Never Used   Substance and Sexual Activity    Alcohol use: Not Currently     Comment: Due to medications I have not drank for several years.    Drug use: Never   Other Topics Concern    Caffeine Concern Yes     Comment: soda 4 cans a day    Exercise No    Seat Belt No    Special Diet No    Stress Concern No    Weight Concern No    Pt has a pacemaker No    Pt has a defibrillator No    Reaction to local anesthetic No     Social Determinants of Health     Financial Resource Strain: Low Risk  (2023)    [place] [facility] [place] [place]    Overall Financial Resource Strain (CARDIA)     Difficulty of Paying Living Expenses: Not hard at all   Food Insecurity: No Food Insecurity  (12/26/2023)    [facility]    Hunger Vital Sign     Worried About Running Out of Food in the Last Year: Never true     Ran Out of Food in the Last Year: Never true   Transportation Needs: No Transportation Needs (12/26/2023)    [facility]    PRAPARE - Transportation     Lack of Transportation (Medical): No     Lack of Transportation (Non-Medical): No   Housing Stability: Unknown (6/3/2022)    [facility]    Housing Stability Vital Sign     Unable to Pay for Housing in the Last Year: No     In the last 12 months, was there a time when you did not have a steady place to sleep or slept in a shelter (including now)?: No        FAMILY HISTORY  Family History   Problem Relation Age of Onset    Other (Dementia) Mother         Late 70s    Glaucoma Mother     Other (partial colectomy) Mother     Dementia Mother         In later years    Depression Mother     Heart Disorder Father         Bypass surgery    Macular degeneration Neg     Diabetes Neg           PHYSICAL EXAM:     CONSTITUTIONAL: Overall appearance - Normal  HEENT: Normocephalic  EYES: Conjunctiva - Right: Normal, Left: Normal; EOMI  EARS: Inspection - Right: Normal, Left: Normal  NECK/THYROID: Inspection - Normal, Palpation - Normal, Thyroid gland - Normal, No adenopathy  RESPIRATORY: Inspection - Normal, Effort - Normal  CARDIOVASCULAR: Regular rhythm, No murmurs  ABDOMEN: Inspection - Normal, No abdominal tenderness  NEURO: Memory intact  PSYCH: Oriented to person, place, time, and situation, Appropriate mood and affect      Hand Physical Exam:     Left thumb radial IP 7 mm pyogenic granuloma with minimal lax skin        ASSESSMENT/PLAN:     IMPRESSION:    PYOGENIC GRANULOMA      We had a long discussion.  I explained to the patient the nature of this  lesion / these lesions, as well as treatment options.    WIDE EXCISION: treatment requires wide excision.  A scar will result, which will be permanent.  The scar will be considerably longer than the size of the lesion, as we need to excise margins around the tumor.  Further excision may be necessary, if tumor is present microscopically on the pathology report.  This may result in a longer scar.  Recurrence may occur, which will require further surgery.        TREATMENT:    Questions were answered and the patient wishes to proceed with treatment.    Wide excision  Flap local transposition    RISKS:     Bleeding  Infection  Loss of flap  Scar  Pain  Stiffness  Weakness  Loss of function  Anesthesia risks      [place]  Data Reviewed.             +++++++++++++++++++++++++++++++++++++++++++++++++    MEDICAL DECISION MAKING    PROBLEMS      MODERATE    (number / complexity)          Undiagnosed new problem with uncertain prognosis    DATA         STRAIGHTFORWARD    (amount / complexity)           MANAGEMENT RISK  HIGH    (complications/ morbidity)       Major surgery with risk factors                  MDM LEVEL    MODERATE      5/6/2024  Vasile Gold MD

## 2024-05-07 ENCOUNTER — ANESTHESIA (OUTPATIENT)
Dept: SURGERY | Facility: HOSPITAL | Age: 74
End: 2024-05-07
Payer: MEDICARE

## 2024-05-07 ENCOUNTER — HOSPITAL DOCUMENTATION (OUTPATIENT)
Dept: SURGERY | Facility: CLINIC | Age: 74
End: 2024-05-07

## 2024-05-07 ENCOUNTER — HOSPITAL ENCOUNTER (OUTPATIENT)
Facility: HOSPITAL | Age: 74
Setting detail: HOSPITAL OUTPATIENT SURGERY
Discharge: HOME | End: 2024-05-07
Attending: PLASTIC SURGERY | Admitting: PLASTIC SURGERY
Payer: MEDICARE

## 2024-05-07 ENCOUNTER — ANESTHESIA EVENT (OUTPATIENT)
Dept: SURGERY | Facility: HOSPITAL | Age: 74
End: 2024-05-07
Payer: MEDICARE

## 2024-05-07 VITALS
TEMPERATURE: 98 F | WEIGHT: 193 LBS | OXYGEN SATURATION: 97 % | HEART RATE: 64 BPM | BODY MASS INDEX: 27.63 KG/M2 | DIASTOLIC BLOOD PRESSURE: 60 MMHG | SYSTOLIC BLOOD PRESSURE: 142 MMHG | RESPIRATION RATE: 18 BRPM | HEIGHT: 70 IN

## 2024-05-07 DIAGNOSIS — L98.0 PYOGENIC GRANULOMA OF SKIN: Primary | ICD-10-CM

## 2024-05-07 DIAGNOSIS — L98.0 PYOGENIC GRANULOMA OF SKIN: ICD-10-CM

## 2024-05-07 PROBLEM — Z04.9 OBSERVATION FOR SUSPECTED CONDITION: Status: ACTIVE | Noted: 2024-05-07

## 2024-05-07 PROBLEM — Z04.9 OBSERVATION FOR SUSPECTED CONDITION: Status: ACTIVE | Noted: 2024-01-01

## 2024-05-07 PROCEDURE — 0HXGXZZ TRANSFER LEFT HAND SKIN, EXTERNAL APPROACH: ICD-10-PCS | Performed by: PLASTIC SURGERY

## 2024-05-07 PROCEDURE — 14040 TIS TRNFR F/C/C/M/N/A/G/H/F: CPT | Performed by: PLASTIC SURGERY

## 2024-05-07 RX ORDER — LIDOCAINE HYDROCHLORIDE 10 MG/ML
INJECTION, SOLUTION EPIDURAL; INFILTRATION; INTRACAUDAL; PERINEURAL AS NEEDED
Status: DISCONTINUED | OUTPATIENT
Start: 2024-05-07 | End: 2024-05-07 | Stop reason: HOSPADM

## 2024-05-07 RX ORDER — ACETAMINOPHEN 500 MG
1000 TABLET ORAL ONCE
Status: COMPLETED | OUTPATIENT
Start: 2024-05-07 | End: 2024-05-07

## 2024-05-07 RX ORDER — EPHEDRINE SULFATE 50 MG/ML
INJECTION INTRAVENOUS AS NEEDED
Status: DISCONTINUED | OUTPATIENT
Start: 2024-05-07 | End: 2024-05-07 | Stop reason: SURG

## 2024-05-07 RX ORDER — NALOXONE HYDROCHLORIDE 0.4 MG/ML
0.08 INJECTION, SOLUTION INTRAMUSCULAR; INTRAVENOUS; SUBCUTANEOUS AS NEEDED
Status: DISCONTINUED | OUTPATIENT
Start: 2024-05-07 | End: 2024-05-07

## 2024-05-07 RX ORDER — SODIUM CHLORIDE, SODIUM LACTATE, POTASSIUM CHLORIDE, CALCIUM CHLORIDE 600; 310; 30; 20 MG/100ML; MG/100ML; MG/100ML; MG/100ML
INJECTION, SOLUTION INTRAVENOUS CONTINUOUS
Status: DISCONTINUED | OUTPATIENT
Start: 2024-05-07 | End: 2024-05-07

## 2024-05-07 RX ORDER — HYDROCODONE BITARTRATE AND ACETAMINOPHEN 7.5; 325 MG/1; MG/1
1 TABLET ORAL EVERY 4 HOURS PRN
Status: DISCONTINUED | OUTPATIENT
Start: 2024-05-07 | End: 2024-05-07

## 2024-05-07 RX ORDER — CEFAZOLIN SODIUM/WATER 2 G/20 ML
2 SYRINGE (ML) INTRAVENOUS ONCE
Status: COMPLETED | OUTPATIENT
Start: 2024-05-07 | End: 2024-05-07

## 2024-05-07 RX ORDER — ONDANSETRON 2 MG/ML
INJECTION INTRAMUSCULAR; INTRAVENOUS AS NEEDED
Status: DISCONTINUED | OUTPATIENT
Start: 2024-05-07 | End: 2024-05-07 | Stop reason: SURG

## 2024-05-07 RX ADMIN — EPHEDRINE SULFATE 10 MG: 50 INJECTION INTRAVENOUS at 13:59:00

## 2024-05-07 RX ADMIN — EPHEDRINE SULFATE 10 MG: 50 INJECTION INTRAVENOUS at 14:14:00

## 2024-05-07 RX ADMIN — CEFAZOLIN SODIUM/WATER 2 G: 2 G/20 ML SYRINGE (ML) INTRAVENOUS at 13:38:00

## 2024-05-07 RX ADMIN — SODIUM CHLORIDE, SODIUM LACTATE, POTASSIUM CHLORIDE, CALCIUM CHLORIDE: 600; 310; 30; 20 INJECTION, SOLUTION INTRAVENOUS at 14:17:00

## 2024-05-07 RX ADMIN — EPHEDRINE SULFATE 10 MG: 50 INJECTION INTRAVENOUS at 14:07:00

## 2024-05-07 RX ADMIN — ONDANSETRON 4 MG: 2 INJECTION INTRAMUSCULAR; INTRAVENOUS at 14:16:00

## 2024-05-07 NOTE — BRIEF OP NOTE
Pre-Operative Diagnosis: Pyogenic granuloma of skin [L98.0]     Post-Operative Diagnosis: Pyogenic granuloma of skin [L98.0]      Procedure Performed:   Wide excision lesion left thumb flap reconstruction    Surgeons and Role:     * Vasile Antony MD - Primary    Assistant(s):        Surgical Findings: Pyogenic Granuloma     Specimen: Pyogenic Granuloma     Estimated Blood Loss: 2 ml    Dictation Number:      Vasile Gold MD  5/7/2024  2:34 PM

## 2024-05-07 NOTE — ANESTHESIA PREPROCEDURE EVALUATION
Anesthesia PreOp Note    HPI:     Ben Choi is a 73 year old male who presents for preoperative consultation requested by: Vasile Antony MD    Date of Surgery: 5/7/2024    Procedure(s):  Wide excision lesion left thumb flap reconstruction  Indication: Pyogenic granuloma of skin [L98.0]    Relevant Problems   No relevant active problems       NPO:  Last Liquid Consumption Date: 05/06/24  Last Liquid Consumption Time: 1900  Last Solid Consumption Date: 05/06/24  Last Solid Consumption Time: 1900  Last Liquid Consumption Date: 05/06/24          History Review:  Patient Active Problem List    Diagnosis Date Noted    Ataxia 07/16/2023    Pseudophakia of both eyes 06/15/2022    Visual impairment     Right leg weakness     Lumbar disc herniation with radiculopathy     Eczema     BPH (benign prostatic hyperplasia)     Age-related nuclear cataract of both eyes 03/24/2021    Floaters in visual field, bilateral 03/24/2021    S/P lumbar microdiscectomy     Chronic left lumbar radiculopathy 01/05/2021    HNP (herniated nucleus pulposus), lumbar 01/05/2021    Poor posture 01/05/2021    Sleep disturbance 01/05/2021    Ganglion cyst of wrist, left 07/26/2018    Smoker 09/07/2016    Psoriasis 09/07/2016    Dyslipidemia 09/07/2016       Past Medical History:    Allergic rhinitis    Hayfever    Arthritis    Work related    Back problem    BPH (benign prostatic hyperplasia)    Depression    Eczema    Hearing impairment    bilateral    Hypotension    orthostatic    Hypotension    Lumbar disc herniation with radiculopathy    Right leg weakness    Visual impairment    glasses       Past Surgical History:   Procedure Laterality Date    Back surgery  2/15/2021    Clean out disc    Cataract  2021    Cataract extraction w/  intraocular lens implant Left 11/16/2021    PC IOL topical IV sedation- Dr. Barriga @ [facility]    Cataract extraction w/  intraocular lens implant Right 10/19/2021    Dr. Barriga    Cataract extraction w/ intraocular  lens implant Left 11/2021    Dr. Barriga     Cholecystectomy      Colonoscopy      Colonoscopy  April, 2021    Other  11/2021    left eye     Other  10/2021    right eye    Spine surgery procedure unlisted  02/15/2021    LEFT LUMBAR 4 -LUMBAR 5 MICRODISCECTOMY per Dr. Hernandez    Tonsillectomy  As a child       Medications Prior to Admission   Medication Sig Dispense Refill Last Dose    cyclobenzaprine 5 MG Oral Tab Take 1 tablet (5 mg total) by mouth 3 (three) times daily as needed. 60 tablet 1 Past Week    glycopyrrolate 1 MG Oral Tab Take 1 tablet (1 mg total) by mouth 3 (three) times daily as needed. (Patient taking differently: Take 1 tablet (1 mg total) by mouth daily.) 30 tablet 0 5/6/2024 at 1200    Multiple Vitamins-Minerals (CENTRUM SILVER 50+MEN) Oral Tab    5/6/2024 at 0600    Upadacitinib ER (RINVOQ) 15 MG Oral Tablet 24 Hr    5/6/2024 at 0600    midodrine 5 MG Oral Tab Take 1 tablet (5 mg total) by mouth in the morning and 1 tablet (5 mg total) at noon and 1 tablet (5 mg total) in the evening.   5/7/2024 at 0630    vitamin E 100 UNITS Oral Cap Take 1 capsule (100 Units total) by mouth daily.   5/6/2024 at 1200    Cyanocobalamin (VITAMIN B12) 500 MCG Oral Tab Take 500 mcg by mouth daily.   5/6/2024 at 1200    donepezil 10 MG Oral Tab Take 1 tablet (10 mg total) by mouth nightly. 90 tablet 3 5/6/2024 at 1930    memantine 5 MG Oral Tab Take 1 tablet (5 mg total) by mouth daily. 90 tablet 3 5/6/2024 at 1200    acetaminophen 500 MG Oral Tab Take 1 tablet (500 mg total) by mouth every 4 (four) hours as needed for Fever (equal to or greater than 100.4).  0 5/6/2024 at 0600    tamsulosin (FLOMAX) cap Take 1 capsule (0.4 mg total) by mouth daily.   5/7/2024 at 0630    HYDROcodone-acetaminophen 7.5-325 MG Oral Tab Take 1 tablet by mouth every 4 to 6 hours as needed for Pain. 10 tablet 0 Unknown    polyethylene glycol, PEG 3350-KCl-NaBcb-NaCl-NaSulf, 236 g Oral Recon Soln Take 4,000 mL by mouth As Directed. Take  2,000 mL the night before your procedure and 2,000 mL the morning of your procedure. Take as directed by GI clinic. Okay to substitute for generic. (Patient not taking: Reported on 2024) 1 each 0 Unknown    melatonin 5 MG Oral Cap Take 1 capsule (5 mg total) by mouth nightly. (Patient not taking: Reported on 2024)   Unknown    sennosides 17.2 MG Oral Tab Take 1 tablet (17.2 mg total) by mouth nightly as needed (constipation, as needed if no bowel movement that day). (Patient not taking: Reported on 2024)  0 Unknown    Emollient (CERAVE) External Cream Apply topically daily. (Patient not taking: Reported on 2024)        Current Facility-Administered Medications Ordered in Epic   Medication Dose Route Frequency Provider Last Rate Last Admin    lactated ringers infusion   Intravenous Continuous Vasile Antony MD 20 mL/hr at 24 1051 New Bag at 24 1051    ceFAZolin (Ancef) 2 g in 20mL IV syringe premix  2 g Intravenous Once Vasile Antony MD         No current [facility]-ordered outpatient medications on file.       Allergies   Allergen Reactions    Dupilumab SWELLING and Tightness in Throat    Mildew OTHER (SEE COMMENTS)     Sneezing    Mold OTHER (SEE COMMENTS)     Sneezing       Family History   Problem Relation Age of Onset    Other (Dementia) Mother         Late 70s    Glaucoma Mother     Other (partial colectomy) Mother     Dementia Mother         In later years    Depression Mother     Heart Disorder Father         Bypass surgery    Macular degeneration Neg     Diabetes Neg      Social History     Socioeconomic History    Marital status:    Tobacco Use    Smoking status: Former     Current packs/day: 0.00     Types: Cigarettes     Quit date: 1976     Years since quittin.1     Passive exposure: Past    Smokeless tobacco: Never    Tobacco comments:     Long time smoker, finally quit under doctor care   Vaping Use    Vaping status: Never Used   Substance and  Sexual Activity    Alcohol use: Not Currently     Comment: Due to medications I have not drank for several years.    Drug use: Never   Other Topics Concern    Caffeine Concern Yes     Comment: soda 4 cans a day    Exercise No    Seat Belt No    Special Diet No    Stress Concern No    Weight Concern No    Pt has a pacemaker No    Pt has a defibrillator No    Reaction to local anesthetic No       Available pre-op labs reviewed.  Lab Results   Component Value Date    WBC 6.4 03/01/2024    RBC 4.07 03/01/2024    HGB 13.6 03/01/2024    HCT 39.6 03/01/2024    MCV 97.3 03/01/2024    MCH 33.4 03/01/2024    MCHC 34.3 03/01/2024    RDW 12.5 03/01/2024    .0 03/01/2024     Lab Results   Component Value Date     03/01/2024    K 4.1 03/01/2024     03/01/2024    CO2 25.0 03/01/2024    BUN 24 (H) 03/01/2024    CREATSERUM 1.24 03/01/2024     (H) 03/01/2024    CA 9.4 03/01/2024          Vital Signs:  Body mass index is 27.69 kg/m².   height is 1.778 m (5' 10\") and weight is 87.5 kg (193 lb). His oral temperature is 97.6 °F (36.4 °C). His blood pressure is 121/61 and his pulse is 81. His respiration is 18 and oxygen saturation is 99%.   Vitals:    05/06/24 1326 05/07/24 1036   BP:  121/61   Pulse:  81   Resp:  18   Temp:  97.6 °F (36.4 °C)   TempSrc:  Oral   SpO2:  99%   Weight: 90.3 kg (199 lb) 87.5 kg (193 lb)   Height: 1.778 m (5' 10\") 1.778 m (5' 10\")        Anesthesia Evaluation      No history of anesthetic complications   Airway   Mallampati: II  TM distance: >3 FB  Neck ROM: full  Dental      Pulmonary     breath sounds clear to auscultation  (-) COPD, asthma, sleep apnea, decreased breath sounds, wheezes  Cardiovascular   Exercise tolerance: good  (-) pacemaker, hypertension, murmur    Rhythm: regular  Rate: normal    Neuro/Psych    (+)  neuromuscular disease,  depression    (-) seizures    GI/Hepatic/Renal    (-) GERD    Endo/Other    (-) diabetes mellitus  Abdominal                  Anesthesia  Plan:   ASA:  2  Plan:   MAC  Post-op Pain Management: IV analgesics and Local  Informed Consent Plan and Risks Discussed With:  Patient and spouse      I have informed Ben Choi and/or legal guardian or family member of the nature of the anesthetic plan, benefits, risks including possible dental damage if relevant, major complications, and any alternative forms of anesthetic management.   All of the patient's questions were answered to the best of my ability. The patient desires the anesthetic management as planned.  Baljeet Yu MD  5/7/2024 1:21 PM  Present on Admission:  **None**

## 2024-05-07 NOTE — INTERVAL H&P NOTE
Pre-op Diagnosis: Pyogenic granuloma of skin [L98.0]    The above referenced H&P was reviewed by Vasile Gold MD on 5/7/2024, the patient was examined and no significant changes have occurred in the patient's condition since the H&P was performed.  I discussed with the patient and/or legal representative the potential benefits, risks and side effects of this procedure; the likelihood of the patient achieving goals; and potential problems that might occur during recuperation.  I discussed reasonable alternatives to the procedure, including risks, benefits and side effects related to the alternatives and risks related to not receiving this procedure.  We will proceed with procedure as planned.

## 2024-05-07 NOTE — DISCHARGE INSTRUCTIONS
HOME INSTRUCTIONS  Dr Gold Discharge Instructions      Vasile Gold M.D.   [phone]  Plastic and Reconstructive Surgery, Hand Surgery  [address], [address]  [place] [address]     GENERAL INSTRUCTIONS:  Do not remove dressing for any reason.  Keep dressing clean and dry.  Some drainage (blood and fluid) through the dressing is expected.  Some swelling is normal.  Replace paper tapes only if they come off themselves.  You may wash the area today.  Take medications as directed.  Do not drive.  Keep leg elevated on two pillows.  Empty and record drainage every 12 hours.  Remove entire dressing tomorrow. Begin washing area daily (do not soak).  Apply Polysporin and a Band-aid.      HANDS:  Keep elevated (above heart-level) at all times.  Do not try to move fingers or hand within the dressing.  Check fingertips for circulation.  Gentle fist - 10 repetitions 4 times a day.  Keep in a sling at all times.               YOU HAVE AN APPOINTMENT AT THE OFFICE ON _________________    PLEASE CALL THE OFFICE _____________________________________     AND MAKE AN APPOINTMENT FOR ______________________________               HOME INSTRUCTIONS  [facility] HOME CARE INSTRUCTIONS: POST-OP ANESTHESIA  The medication that you received for sedation or general anesthesia can last up to 24 hours. Your judgment and reflexes may be altered, even if you feel like your normal self.      We Recommend:   Do not drive any motor vehicle or bicycle   Avoid mowing the lawn, playing sports, or working with power tools/applicances (power saws, electric knives or mixers)   That you have someone stay with you on your first night home   Do not drink alcohol or take sleeping pills or tranquilizers   Do not sign legal documents within 24 hours of your procedure   If you had a nerve block for your surgery, take extra care not to put any pressure on your arm or hand for 24 hours    It is normal:  For you to have a sore  throat if you had a breathing tube during surgery (while you were asleep!). The sore throat should get better within 48 hours. You can gargle with warm salt water (1/2 tsp in 4 oz warm water) or use a throat lozenge for comfort  To feel muscle aches or soreness especially in the abdomen, chest or neck. The achy feeling should go away in the next 24 hours  To feel weak, sleepy or \"wiped out\". Your should start feeling better in the next 24 hours.   To experience mild discomforts such as sore lip or tongue, headache, cramps, gas pains or a bloated feeling in your abdomen.   To experience mild back pain or soreness for a day or two if you had spinal or epidural anesthesia.   If you had laparoscopic surgery, to feel shoulder pain or discomfort on the day of surgery.   For some patients to have nausea after surgery/anesthesia    If you feel nausea or experience vomiting:   Try to move around less.   Eat less than usual or drink only liquids until the next morning   Nausea should resolve in about 24 hours    If you have a problem when you are at home:    Call your surgeons office +      Discharge Instructions: After Your Surgery  You’ve just had surgery. During surgery, you were given medicine called anesthesia to keep you relaxed and free of pain. After surgery, you may have some pain or nausea. This is common. Here are some tips for feeling better and getting well after surgery.   Going home  Your healthcare provider will show you how to take care of yourself when you go home. They'll also answer your questions. Have an adult family member or friend drive you home. For the first 24 hours after your surgery:   Don't drive or use heavy equipment.  Don't make important decisions or sign legal papers.  Take medicines as directed.  Don't drink alcohol.  Have someone stay with you, if needed. They can watch for problems and help keep you safe.  Be sure to go to all follow-up visits with your healthcare provider. And rest  after your surgery for as long as your provider tells you to.   Coping with pain  If you have pain after surgery, pain medicine will help you feel better. Take it as directed, before pain becomes severe. Also, ask your healthcare provider or pharmacist about other ways to control pain. This might be with heat, ice, or relaxation. And follow any other instructions your surgeon or nurse gives you.      Stay on schedule with your medicine.     Tips for taking pain medicine  To get the best relief possible, remember these points:   Pain medicines can upset your stomach. Taking them with a little food may help.  Most pain relievers taken by mouth need at least 20 to 30 minutes to start to work.  Don't wait till your pain becomes severe before you take your medicine. Try to time your medicine so that you can take it before starting an activity. This might be before you get dressed, go for a walk, or sit down for dinner.  Constipation is a common side effect of some pain medicines. Call your healthcare provider before taking any medicines such as laxatives or stool softeners to help ease constipation. Also ask if you should skip any foods. Drinking lots of fluids and eating foods such as fruits and vegetables that are high in fiber can also help. Remember, don't take laxatives unless your surgeon has prescribed them.  Drinking alcohol and taking pain medicine can cause dizziness and slow your breathing. It can even be deadly. Don't drink alcohol while taking pain medicine.  Pain medicine can make you react more slowly to things. Don't drive or run machinery while taking pain medicine.  Your healthcare provider may tell you to take acetaminophen to help ease your pain. Ask them how much you're supposed to take each day. Acetaminophen or other pain relievers may interact with your prescription medicines or other over-the-counter (OTC) medicines. Some prescription medicines have acetaminophen and other ingredients in them.  Using both prescription and OTC acetaminophen for pain can cause you to accidentally overdose. Read the labels on your OTC medicines with care. This will help you to clearly know the list of ingredients, how much to take, and any warnings. It may also help you not take too much acetaminophen. If you have questions or don't understand the information, ask your pharmacist or healthcare provider to explain it to you before you take the OTC medicine.   Managing nausea  Some people have an upset stomach (nausea) after surgery. This is often because of anesthesia, pain, or pain medicine, less movement of food in the stomach, or the stress of surgery. These tips will help you handle nausea and eat healthy foods as you get better. If you were on a special food plan before surgery, ask your healthcare provider if you should follow it while you get better. Check with your provider on how your eating should progress. It may depend on the surgery you had. These general tips may help:   Don't push yourself to eat. Your body will tell you when to eat and how much.  Start off with clear liquids and soup. They're easier to digest.  Next try semi-solid foods as you feel ready. These include mashed potatoes, applesauce, and gelatin.  Slowly move to solid foods. Don’t eat fatty, rich, or spicy foods at first.  Don't force yourself to have 3 large meals a day. Instead eat smaller amounts more often.  Take pain medicines with a small amount of solid food, such as crackers or toast. This helps prevent nausea.  When to call your healthcare provider  Call your healthcare provider right away if you have any of these:   You still have too much pain, or the pain gets worse, after taking the medicine. The medicine may not be strong enough. Or there may be a complication from the surgery.  You feel too sleepy, dizzy, or groggy. The medicine may be too strong.  Side effects such as nausea or vomiting. Your healthcare provider may advise taking  other medicines to .  Skin changes such as rash, itching, or hives. This may mean you have an allergic reaction. Your provider may advise taking other medicines.  The incision looks different (for instance, part of it opens up).  Bleeding or fluid leaking from the incision site, and weren't told to expect that.  Fever of 100.4°F (38°C) or higher, or as directed by your provider.  Call 911  Call 911 right away if you have:   Trouble breathing  Facial swelling    If you have obstructive sleep apnea   You were given anesthesia medicine during surgery to keep you comfortable and free of pain. After surgery, you may have more apnea spells because of this medicine and other medicines you were given. The spells may last longer than normal.    At home:  Keep using the continuous positive airway pressure (CPAP) device when you sleep. Unless your healthcare provider tells you not to, use it when you sleep, day or night. CPAP is a common device used to treat obstructive sleep apnea.  Talk with your provider before taking any pain medicine, muscle relaxants, or sedatives. Your provider will tell you about the possible dangers of taking these medicines.  Contact your provider if your sleeping changes a lot even when taking medicines as directed.  [employer] last reviewed this educational content on 10/1/2021  © 4211-8588 [employer]. All rights reserved. This information is not intended as a substitute for professional medical care. Always follow your healthcare professional's instructions.

## 2024-05-07 NOTE — OPERATIVE REPORT
[facility]    PATIENT'S NAME: ALVARO MK SHASTA ZAMBRANO   ATTENDING PHYSICIAN: Vasile Gold MD   OPERATING PHYSICIAN: Vasile Gold MD   PATIENT ACCOUNT#:   [id number]    LOCATION:  [facility] 1 EMHP 10  MEDICAL RECORD #:   [id number]       YOB: 1950  ADMISSION DATE:       05/07/2024      OPERATION DATE:  05/07/2024    OPERATIVE REPORT      PREOPERATIVE DIAGNOSIS:  Left thumb pyogenic granuloma.   POSTOPERATIVE DIAGNOSIS:  Left thumb pyogenic granuloma, pending pathology report.   PROCEDURE:  Wide excision of pyogenic granuloma, left thumb; rotation flap reconstruction.     INDICATIONS:  A 73-year-old male, right-hand dominant, on April 19 noted spontaneous bleeding from a lesion of the left thumb.  He does not give a history of trauma.  He has had a persistent friable lesion requiring 2 visits to the immediate care for bleeding control.  He is admitted to the operating amphitheater for excision.    FINDINGS:  A 7 mm in diameter biogenic granuloma is present with underlying macerated skin.      OPERATIVE TECHNIQUE:  Local anesthesia was effected with 1% plain lidocaine.  The area was prepped and draped in the usual sterile fashion.  A pneumatic tourniquet was inflated to 250 mmHg.     The lesion was outlined and the macerated skin excised to healthy tissue.  The pyogenic granuloma extending into the deeper subcutaneous tissues, and it was excised en bloc.  All remaining tissues were healthy, soft, and not involved with the pyogenic process.    This wound would not close directly, as it was on the radial side of the interphalangeal joint.  We incised dorsally and somewhat proximally and elevated a rotation flap which rotated distally and volarly to fill the soft tissue defect.  Skin edges were reapproximated with 4-0 nylon.     The tourniquet was released.  Total tourniquet time 24 minutes.     Hemostasis was achieved with gentle pressure and elevation.  Bacitracin and a  Band-Aid were placed.      The patient tolerated the procedure well and left the operating suite in satisfactory condition.     Dictated By Vasile Gold MD  d: 05/07/2024 14:37:16  t: 05/07/2024 15:35:58  [facility] [id number]  RVJ/    cc: MD Gabriel Chang MD Elaine E. Spirakes, MD

## 2024-05-08 ENCOUNTER — NURSE ONLY (OUTPATIENT)
Dept: SURGERY | Facility: CLINIC | Age: 74
End: 2024-05-08
Payer: MEDICARE

## 2024-05-08 DIAGNOSIS — Z51.89 VISIT FOR WOUND CHECK: Primary | ICD-10-CM

## 2024-05-08 DIAGNOSIS — L98.0 PYOGENIC GRANULOMA OF SKIN: ICD-10-CM

## 2024-05-09 ENCOUNTER — TELEPHONE (OUTPATIENT)
Dept: SURGERY | Facility: CLINIC | Age: 74
End: 2024-05-09

## 2024-05-09 NOTE — ANESTHESIA POSTPROCEDURE EVALUATION
Patient: Ben Key    Procedure Summary       Date: 05/07/24 Room / Location: [facility] MAIN OR 01 / EM MAIN OR    Anesthesia Start: 1328 Anesthesia Stop: 1425    Procedure: Wide excision lesion left thumb flap reconstruction (Left: Finger) Diagnosis:       Pyogenic granuloma of skin      (Pyogenic granuloma of skin [L98.0])    Surgeons: Vasile Antony MD Anesthesiologist: Baljeet Yu MD    Anesthesia Type: MAC ASA Status: 2            Anesthesia Type: MAC    Vitals Value Taken Time   /60 05/07/24 1520   Temp 98 °F (36.7 °C) 05/07/24 1456   Pulse 64 05/07/24 1520   Resp 18 05/07/24 1520   SpO2 97 % 05/07/24 1520       EM AN Post Evaluation:   Patient Evaluated in PACU  Patient Participation: complete - patient participated  Level of Consciousness: awake and alert  Pain Management: adequate  Airway Patency:patent  Dental exam unchanged from preop  Yes    Nausea/Vomiting: none  Cardiovascular Status: hemodynamically stable  Respiratory Status: room air  Postoperative Hydration euvolemic      Baljeet Yu MD

## 2024-05-09 NOTE — TELEPHONE ENCOUNTER
Spoke w/ patient.  Patient told per Dr. Gold pathology results from surgery,  was a pyogenic granuloma as expected.  Patient Verbalized understanding.  Patient Instructed to call the office with any questions and/or concerns.  Dr. Gold notified.

## 2024-05-16 ENCOUNTER — OFFICE VISIT (OUTPATIENT)
Dept: SURGERY | Facility: CLINIC | Age: 74
End: 2024-05-16

## 2024-05-16 DIAGNOSIS — M25.642 STIFFNESS OF JOINT, HAND, LEFT: ICD-10-CM

## 2024-05-16 DIAGNOSIS — M62.81 DISTAL MUSCLE WEAKNESS: Primary | ICD-10-CM

## 2024-05-16 NOTE — PROGRESS NOTES
Subjective: Everything is going well.      Objective:     Current level of performance:  ADL: Independent  Work: Retired  Leisure: Not addressed    Measurements/Tests:  ROM:  Testing By: tobias     MP Thumb Left: 60  IP Thumb Left: 60  Edema Thumb Left: 120 LONDONO         Treatment Provided this day: Sutures were removed from the left thumb per order: Reviewed cold cream scar massage technique.    Treatment Time: N/C      Summary/Analysis of Treatment session: No further OT needs at this time.      Plan: Discontinue OT      Follow up in:  To call with Questions and or concerns.          Grey Marcus  OTR/L

## 2024-05-23 ENCOUNTER — OFFICE VISIT (OUTPATIENT)
Dept: INTERNAL MEDICINE CLINIC | Facility: CLINIC | Age: 74
End: 2024-05-23

## 2024-05-23 VITALS
OXYGEN SATURATION: 96 % | HEART RATE: 75 BPM | SYSTOLIC BLOOD PRESSURE: 96 MMHG | DIASTOLIC BLOOD PRESSURE: 54 MMHG | WEIGHT: 194 LBS | HEIGHT: 70 IN | TEMPERATURE: 98 F | BODY MASS INDEX: 27.77 KG/M2

## 2024-05-23 DIAGNOSIS — S61.012S THUMB LACERATION, LEFT, SEQUELA: Primary | ICD-10-CM

## 2024-05-23 DIAGNOSIS — G62.9 NEUROPATHY: ICD-10-CM

## 2024-05-23 DIAGNOSIS — G30.9 MODERATE ALZHEIMER'S DEMENTIA WITHOUT BEHAVIORAL DISTURBANCE, PSYCHOTIC DISTURBANCE, MOOD DISTURBANCE, OR ANXIETY, UNSPECIFIED TIMING OF DEMENTIA ONSET (HCC): ICD-10-CM

## 2024-05-23 DIAGNOSIS — T14.8XXA NONHEALING NONSURGICAL WOUND: ICD-10-CM

## 2024-05-23 DIAGNOSIS — M54.2 NECK PAIN: ICD-10-CM

## 2024-05-23 DIAGNOSIS — L98.0 PYOGENIC GRANULOMA: ICD-10-CM

## 2024-05-23 DIAGNOSIS — L40.9 PSORIASIS: ICD-10-CM

## 2024-05-23 DIAGNOSIS — F02.B0 MODERATE ALZHEIMER'S DEMENTIA WITHOUT BEHAVIORAL DISTURBANCE, PSYCHOTIC DISTURBANCE, MOOD DISTURBANCE, OR ANXIETY, UNSPECIFIED TIMING OF DEMENTIA ONSET (HCC): ICD-10-CM

## 2024-05-23 PROCEDURE — 99215 OFFICE O/P EST HI 40 MIN: CPT | Performed by: INTERNAL MEDICINE

## 2024-05-23 RX ORDER — TAMSULOSIN HYDROCHLORIDE 0.4 MG/1
0.4 CAPSULE ORAL DAILY
Qty: 90 CAPSULE | Refills: 3 | Status: CANCELLED | OUTPATIENT
Start: 2024-05-23

## 2024-05-23 RX ORDER — FINASTERIDE 5 MG/1
5 TABLET, FILM COATED ORAL DAILY
Qty: 90 TABLET | Refills: 3 | Status: SHIPPED | OUTPATIENT
Start: 2024-05-23 | End: 2025-05-18

## 2024-05-23 RX ORDER — GLYCOPYRROLATE 1 MG/1
1 TABLET ORAL 3 TIMES DAILY
Qty: 90 TABLET | Refills: 1 | Status: SHIPPED | OUTPATIENT
Start: 2024-05-23

## 2024-05-23 RX ORDER — MIDODRINE HYDROCHLORIDE 5 MG/1
5 TABLET ORAL 3 TIMES DAILY
Qty: 270 TABLET | Refills: 3 | Status: SHIPPED | OUTPATIENT
Start: 2024-05-23

## 2024-05-23 NOTE — PATIENT INSTRUCTIONS
You were seen in clinic today for follow-up.  Today, we did review your recent surgical workup with Dr. Gold for which you are managed for pyogenic granuloma, a poorly healing wound of the thumb.  We are happy to hear that you recovered well from surgery  - Monitor for any recurrence of bleeding episodes  - Continue with occupational therapy recommended by Dr. Gold     Main focus here is trying to improve the symptoms of dizziness and hypotension, namely orthostatic hypotension or changes with blood pressure as you change positions  - Continue with midodrine 5 mg 3 times a day as this keeps her blood pressure up  - Keep up with good water intake, slight increase in salt  - Continue with compression stockings as needed during times of physical activity    Medications are common side effect of orthostatic hypotension  - Lets stop the Flomax and see if we improved with a different medication:    Finasteride 5 mg once a day.  This medication helps improve symptoms and could be related to an enlarged prostate  - Look for improvement of the urinary symptoms especially at nighttime which can improve the sleep  - This does not have a significant impact on the blood pressure, with the hope of increasing blood pressure and decreasing the symptoms of dizziness    Will refill your glycopyrrolate for the excessive salivation.  Hold off on this medication for now until you give us a [employer] message update in 3 weeks.    For the neck pain, you have some impingement of the nerves for which we can consider:  - Gabapentin or pregabalin/Lyrica.  The problem this medication as it can cause dizziness, drowsiness, decrease in blood pressure.  I would like to address the blood pressure effects first prior to starting this medication    Your ultrasound did not have any abnormalities of the head    Your last set of blood work was overall reassuring    Return to clinic in 3-4 months for follow-up

## 2024-05-23 NOTE — PROGRESS NOTES
Chief Complaint:   Chief Complaint   Patient presents with    Follow - Up     Thumb surgery, neck follow up, chronic dizziness and sleep troubles         HPI:     Mr. MAY is a 73 year old male PMHX MGUS, psoriasis, hyperlipidemia, chronic low back pain, erythroderma, cognitive impairment coming in for follow-up.    Of note, was seen by Dr. Byrnes for laceration to the left thumb without known injury.  Was seen in urgent care 4/20/2024 for which 1 suture was placed.  However had recurrent episodes of bleeding requiring redressing.  Did see Dr. Gold of hand surgery and underwent wide excision lesion left thumb flap reconstruction 5/7/2024 and tolerated surgery well.    3 weeks for which he had excessive bleeding leading to the management. He has recovered well from surgery    He is not sleeping, getting more leg cramps. Back is still hurting and neck. Dizziness is still ongoing. He would be sitting there and would get a fuzzy vision. He may not understand what Ирина is saying at times.    Urination is still an issue 2x and stream is very weak. Sometimes during the day. Sometimes hard    Salivation symptoms ongoing - did help.     BPs remain soft 90s/50s    He had a rash groin area 2 weeks ago. On fluticasone 0.05%. No itchiness.     He has had some problems with sleep, he has some problems with nightmares at times. He does have goofy sleep.    Past Medical History:    Allergic rhinitis    Hayfever    Arthritis    Work related    Back problem    BPH (benign prostatic hyperplasia)    Depression    Eczema    Hearing impairment    bilateral    Hypotension    orthostatic    Hypotension    Lumbar disc herniation with radiculopathy    Pyogenic granuloma of skin    Left thumb pyogenic granuloma    Right leg weakness    Visual impairment    glasses     Past Surgical History:   Procedure Laterality Date    Adj tiss xfer head,fac,hand <10sqcm Left 05/07/2024    Wide excision pyogenic granuloma L thumb,flap  reconstruction    Back surgery  2/15/2021    Clean out disc    Cataract      Cataract extraction w/  intraocular lens implant Left 2021    PC IOL topical IV sedation- Dr. Barriga @ [facility]    Cataract extraction w/  intraocular lens implant Right 10/19/2021    Dr. Barriga    Cataract extraction w/ intraocular lens implant Left 2021    Dr. Barriga     Cholecystectomy      Colonoscopy      Colonoscopy      Other  2021    left eye     Other  10/2021    right eye    Spine surgery procedure unlisted  02/15/2021    LEFT LUMBAR 4 -LUMBAR 5 MICRODISCECTOMY per Dr. Hernandez    Tonsillectomy  As a child     Social History:  Social History     Socioeconomic History    Marital status:    Tobacco Use    Smoking status: Former     Current packs/day: 0.00     Types: Cigarettes     Quit date: 1976     Years since quittin.1     Passive exposure: Past    Smokeless tobacco: Never    Tobacco comments:     Long time smoker, finally quit under doctor care   Vaping Use    Vaping status: Never Used   Substance and Sexual Activity    Alcohol use: Not Currently     Comment: Due to medications I have not drank for several years.    Drug use: Never   Other Topics Concern    Caffeine Concern Yes     Comment: soda 4 cans a day    Exercise No    Seat Belt No    Special Diet No    Stress Concern No    Weight Concern No    Pt has a pacemaker No    Pt has a defibrillator No    Reaction to local anesthetic No     Social Determinants of Health     Financial Resource Strain: Low Risk  (2023)    [facility]    Overall Financial Resource Strain (CARDIA)     Difficulty of Paying Living Expenses: Not hard at all   Food Insecurity: No Food Insecurity (2023)    [facility]    Hunger Vital Sign     Worried About Running Out of Food in the Last Year: Never true     Ran Out of Food in  the Last Year: Never true   Transportation Needs: No Transportation Needs (12/26/2023)    [facility]    PRAPARE - Transportation     Lack of Transportation (Medical): No     Lack of Transportation (Non-Medical): No   Housing Stability: Unknown (6/3/2022)    [facility]    Housing Stability Vital Sign     Unable to Pay for Housing in the Last Year: No     In the last 12 months, was there a time when you did not have a steady place to sleep or slept in a shelter (including now)?: No     Family History:  Family History   Problem Relation Age of Onset    Other (Dementia) Mother         Late 70s    Glaucoma Mother     Other (partial colectomy) Mother     Dementia Mother         In later years    Depression Mother     Heart Disorder Father         Bypass surgery    Macular degeneration Neg     Diabetes Neg      Allergies:  Allergies   Allergen Reactions    Dupilumab SWELLING and Tightness in Throat    Mildew OTHER (SEE COMMENTS)     Sneezing    Mold OTHER (SEE COMMENTS)     Sneezing     Current Meds:  Current Outpatient Medications   Medication Sig Dispense Refill    glycopyrrolate 1 MG Oral Tab Take 1 tablet (1 mg total) by mouth 3 (three) times daily as needed. (Patient taking differently: Take 1 tablet (1 mg total) by mouth daily.) 30 tablet 0    polyethylene glycol, PEG 3350-KCl-NaBcb-NaCl-NaSulf, 236 g Oral Recon Soln Take 4,000 mL by mouth As Directed. Take 2,000 mL the night before your procedure and 2,000 mL the morning of your procedure. Take as directed by GI clinic. Okay to substitute for generic. 1 each 0    Multiple Vitamins-Minerals (CENTRUM SILVER 50+MEN) Oral Tab       Upadacitinib ER (RINVOQ) 15 MG Oral Tablet 24 Hr Take 1 tablet by mouth daily.      midodrine 5 MG Oral Tab Take 1 tablet (5 mg total) by mouth in the morning and 1 tablet (5 mg total) at noon and 1  tablet (5 mg total) in the evening.      vitamin E 100 UNITS Oral Cap Take 1 capsule (100 Units total) by mouth daily.      Cyanocobalamin (VITAMIN B12) 500 MCG Oral Tab Take 500 mcg by mouth daily.      donepezil 10 MG Oral Tab Take 1 tablet (10 mg total) by mouth nightly. 90 tablet 3    memantine 5 MG Oral Tab Take 1 tablet (5 mg total) by mouth daily. 90 tablet 3    acetaminophen 500 MG Oral Tab Take 1 tablet (500 mg total) by mouth every 4 (four) hours as needed for Fever (equal to or greater than 100.4).  0    tamsulosin (FLOMAX) cap Take 1 capsule (0.4 mg total) by mouth daily.      Emollient (CERAVE) External Cream Apply topically daily.      melatonin 5 MG Oral Cap Take 1 capsule (5 mg total) by mouth nightly. (Patient not taking: Reported on 5/23/2024)        Counseling given: Not Answered  Tobacco comments: Long time smoker, finally quit under doctor care       REVIEW OF SYSTEMS:   Positive Findings indicated in BOLD    Constitutional: Fever, Chills, Weight Gain, Weight Loss, Night Sweats, Fatigue, Malaise  ENT/Mouth:  Hearing Changes, Ear Pain, Nasal Congestion, Sinus Pain, Hoarseness, Sore throat, Rhinorrhea, Swallowing Difficulty, +Excessive salivation  Eyes: Eye Pain, Swelling, Redness, Foreign Body, Discharge, Vision Changes  Cardiovascular: Chest Pain, SOB, PND, Dyspnea on Exertion, Orthopnea, Claudication, Edema, Palpitations  Respiratory: Cough, Sputum, Wheezing, Shortness of breath  Gastrointestinal: Nausea, Vomiting, Diarrhea, Constipation, Pain, Heartburn, Dysphagia, Bloody stools, Tarry stools  Genitourinary: Dysmenorrhea, Dysuria, Urinary Frequency, Hematuria, Urinary Incontinence, Urgency,  Flank Pain  Musculoskeletal: Arthralgias, Myalgias, Joint Swelling, Joint Stiffness, Back Pain, Neck Pain, Leg cramps  Integumentary: Skin Lesions, Pruritis, Hair Changes, Jaundice, Nail changes  Neuro: Weakness, Numbness, Paresthesias, Loss of Consciousness, Syncope, Dizziness, Headache, Falls  Psych:  Anxiety, Depression, Insomnia, Suicidal Ideation, Homicidal ideation, Memory Changes  Heme/Lymph: Bruising, Bleeding, Lymphadenopathy  Endocrine: Polyuria, Polydipsia, Temperature Intolerance    EXAM:   Vital Signs:  Blood pressure 96/54, pulse 75, temperature 97.6 °F (36.4 °C), temperature source Oral, height 5' 10\" (1.778 m), weight 194 lb (88 kg), SpO2 96%.     Constitutional: No acute distress. Alert and oriented x 3.  Eyes: EOMI, PERRLA, clear sclera b/l  HENT: NCAT, Moist mucous membranes, Oropharynx without erythema or exudates  Neck: No JVD, no thyromegaly  +Old biopsy site - palpable soft tissue prominence R neck  Cardiovascular: S1, S2, no S3, no S4, Regular rate and rhythm, No murmurs/gallops/rubs.   Respiratory: Clear to auscultation bilaterally.  No wheezes/rales/rhonchi  Gastrointestinal: Soft, nontender, nondistended. Positive bowel sounds x 4. No rebound tenderness. No hepatomegaly, No splenomegaly  Genitourinary: No CVA tenderness bilaterally  Neurologic: No focal neurological deficits, CN II-XII intact, light touch intact  Musculoskeletal: Full range of motion of all extremities, no clubbing/swelling/edema  Skin: No lesions, No erythema, no jaundice, Cap Refill < 2s  Psychiatric: Appropriate mood and affect  Heme/Lymph/Immune: No cervical LAD    DATA REVIEWED   Labs:  Recent Results (from the past 8760 hour(s))   COMP METABOLIC PANEL [E]    Collection Time: 03/01/24  8:01 AM   Result Value Ref Range    Glucose 103 (H) 70 - 99 mg/dL    Sodium 142 136 - 145 mmol/L    Potassium 4.1 3.5 - 5.1 mmol/L    Chloride 111 98 - 112 mmol/L    CO2 25.0 21.0 - 32.0 mmol/L    Anion Gap 6 0 - 18 mmol/L    BUN 24 (H) 9 - 23 mg/dL    Creatinine 1.24 0.70 - 1.30 mg/dL    BUN/CREA Ratio 19.4 10.0 - 20.0    Calcium, Total 9.4 8.7 - 10.4 mg/dL    Calculated Osmolality 298 (H) 275 - 295 mOsm/kg    eGFR-Cr 61 >=60 mL/min/1.73m2    ALT 27 10 - 49 U/L    AST 26 <=34 U/L    Alkaline Phosphatase 80 45 - 117 U/L    Bilirubin,  Total 0.5 0.2 - 1.1 mg/dL    Total Protein 7.0 5.7 - 8.2 g/dL    Albumin 4.1 3.2 - 4.8 g/dL    Globulin  2.9 2.8 - 4.4 g/dL    A/G Ratio 1.4 1.0 - 2.0    Patient Fasting for CMP? No      *Note: Due to a large number of results and/or encounters for the requested time period, some results have not been displayed. A complete set of results can be found in Results Review.       Recent Results (from the past 8760 hour(s))   CBC W/ DIFFERENTIAL    Collection Time: 03/01/24  8:01 AM   Result Value Ref Range    WBC 6.4 4.0 - 11.0 x10(3) uL    RBC 4.07 3.80 - 5.80 x10(6)uL    HGB 13.6 13.0 - 17.5 g/dL    HCT 39.6 39.0 - 53.0 %    MCV 97.3 80.0 - 100.0 fL    MCH 33.4 26.0 - 34.0 pg    MCHC 34.3 31.0 - 37.0 g/dL    RDW-SD 44.8 35.1 - 46.3 fL    RDW 12.5 11.0 - 15.0 %    .0 150.0 - 450.0 10(3)uL    Neutrophil Absolute Prelim 4.70 1.50 - 7.70 x10 (3) uL    Neutrophil Absolute 4.70 1.50 - 7.70 x10(3) uL    Lymphocyte Absolute 0.92 (L) 1.00 - 4.00 x10(3) uL    Monocyte Absolute 0.58 0.10 - 1.00 x10(3) uL    Eosinophil Absolute 0.13 0.00 - 0.70 x10(3) uL    Basophil Absolute 0.03 0.00 - 0.20 x10(3) uL    Immature Granulocyte Absolute 0.02 0.00 - 1.00 x10(3) uL    Neutrophil % 73.7 %    Lymphocyte % 14.4 %    Monocyte % 9.1 %    Eosinophil % 2.0 %    Basophil % 0.5 %    Immature Granulocyte % 0.3 %     *Note: Due to a large number of results and/or encounters for the requested time period, some results have not been displayed. A complete set of results can be found in Results Review.       Imaging:  MRI cervical spine 7/17/2023    Impression   CONCLUSION:        No cord signal abnormality or evidence of abnormal intrathecal enhancement within the limitations of motion artifact.       Multilevel degenerative changes of the cervical spine, which are most advanced at C3-4 with a disc osteophyte complex and superimposed right paracentral disc extrusion resulting in moderate to severe canal stenosis with the extruded disc causing  max  effect on the ventral cord, severe right and moderate left axillary recess stenosis, and severe right and moderate left foraminal narrowing .  The remaining levels are described in detail above.          Ultrasound head and neck 4/16/2024  Findings and impression:  There is no solid mass/adenopathy or fluid collection in the soft tissues of the posterior right neck at the site of palpable abnormality       Surgical pathology 5/7/2024       Left thumb lesion; wide excision:   Skin with subepithelial ulcerated pyogenic granuloma, seen focally at specimen inked margin.  No evidence of atypia or malignancy is identified.       ASSESSMENT AND PLAN:     Pyogenic granuloma  Of note, was seen by Dr. Byrnes for laceration to the left thumb without known injury.  Was seen in urgent care 4/20/2024 for which 1 suture was placed.  However had recurrent episodes of bleeding requiring redressing.  Did see Dr. Gold of hand surgery and underwent wide excision lesion left thumb flap reconstruction 5/7/2024 and tolerated surgery well.  - Seems to be healing well  - Ongoing occupational therapy as recommended by Dr. 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 Martinez.  Possible Florinef/Mestinon incorporation  - We will switch Flomax to finasteride as below    Neck pain  May be due to muscle strain, Lower suspicion cervical radiculopathy from cervical spinal stenosis.  Acute on chronic osteoarthritis pain  - On examination there may be some prominent soft tissue along old R cervical chain biopsy site -recommend checking ultrasound of the right neck area  - Cervical spine MRI 7/17: Multilevel degenerative changes of the cervical spine, most  advanced at C3-C4 with disc osteophyte complex and superimposed right paracentral disc extrusion.  This is resulting in moderate to severe canal stenosis with extruded disc causing mass effect on the ventral cord  -Acetaminophen 500-650 mg every 4-6 hours as needed for pain relief  -Naproxen 500 mg every 12 hours as needed for anti-inflammatory and pain relief  -For more severe pain, will try cyclobenzaprine 5 mg. Take first at nighttime as this can cause sleepiness, drowsiness.  If tolerated well, can use 3 times a day on an as-needed basis.  Be careful with driving or operating heavy machinery on this medication  -Trial of home stretches and exercises  -Once we have the orthostatic hypotension symptoms under better control, we will consider use of gabapentin or Lyrica.  Hold for now due to concern for potential side effects.    Muscle cramps  -Continue medical management with the use of muscle relaxers as above.    Excessive Salivation  -Unclear cause, possibly due to medication side effects  - We will try glycopyrrolate 1 tablet up to 3 times a day as needed.  Should try taking it a time first, monitor for excessive dryness of the mouth.  - There is slight interaction with donepezil, but glycopyrrolate should be okay to use in the short-term..     MGUS  Underwent neurological workup inpatient for hospitalization in 2023.  Noted to have free light chain kappa 3.2, lambda 3.5 with ratio 0.91.  Electrophoresis with IgG lambda monoclonal suspicious for MGUS.  - Established with Dr. Bustamante, last seen 3/8/2024.  Seems to be stable, not contributing to autonomic dysfunction/neuropathy.  Ongoing yearly follow-up.     Psoriasis  - On upadacitinib 15 mg daily -overall well-controlled.     Hyperlipidemia  - Lipid panel with   - Not currently on statin medication     History of colon polyps  -Reported to have 9 polyps on last colonoscopy  - Has colonoscopy scheduled in July.     Chronic low back pain with  radiculopathy  History of lumbar microdiscectomy  -Recently seen by Dr. Oviedo 12/26/2023 due to worsening back pain.  Treated with Medrol Dosepak, muscle relaxers, ongoing conservative measures.  -Comanagement with acute neck pain as above    BPH  - Some relief with the flomax, however will try to improve symptoms with finasteride 5 mg once a day in the event this is contributing to orthostatic hypotension.  - Check PSA     Vitamin D deficiency  - Will check vitamin D level  - Continue with     Erythroderma  - Seems to be well-controlled, under surveillance by dermatology Dr. Denise Lantigua     Eczema  - Seems to be controlled on CeraVe     Dementia  Suspected Alzheimer's dementia.  Following with neurology.  Workup has included MRI brain without significant acute findings temporal atrophy and generalized volume loss.  Also had MRI cervical spine with multilevel degenerative images.  Full secondary workup largely unremarkable.  Completed neuropsychological test  -Noted vitamin D deficiency  - Last seen by Dr. Martinez 3/14/2024: MoCA 13/30 with global deficits.  Advised to hold donepezil, memantine to see if sleep improves.  Consideration of sleep study     Neuropathy  Multifactorial due to vitamin B12 deficiency, MGUS, vitamin D deficiency  - Ongoing vitamin B12 and vitamin D supplementation  -Seems to be a prominent symptom but stable for now         Orders This Visit:  No orders of the defined types were placed in this encounter.      Meds This Visit:  Requested Prescriptions     Pending Prescriptions Disp Refills    midodrine 5 MG Oral Tab 270 tablet 3     Sig: Take 1 tablet (5 mg total) by mouth in the morning and 1 tablet (5 mg total) at noon and 1 tablet (5 mg total) in the evening.    tamsulosin 0.4 MG Oral Cap 90 capsule 3     Sig: Take 1 capsule (0.4 mg total) by mouth daily.       Imaging & Referrals:  None     Health Maintenance  Colonoscopy scheduled 7/31/2024 with Dr. Dow    Spent 40 minutes  obtaining history, evaluating patient, discussing treatment options, diet, exercise, review of available labs and radiology reports, and completing documentation.       Return to clinic in 3-4 months    Gabriel Rodríguez MD, 05/23/24, 3:48 PM

## 2024-06-14 ENCOUNTER — TELEPHONE (OUTPATIENT)
Dept: INTERNAL MEDICINE CLINIC | Facility: CLINIC | Age: 74
End: 2024-06-14

## 2024-06-14 NOTE — TELEPHONE ENCOUNTER
To [facility] to review---do not think #270 appropriate at this time  Should still have a refill of #90 on file

## 2024-06-14 NOTE — TELEPHONE ENCOUNTER
MARGARET Figueroa faxed     Glycopyrrolate - 90 Day Prescription Request     Qty 270    Take 1 tablet by mouth 3 times daily     Fax placed in green folder

## 2024-07-15 ENCOUNTER — PATIENT MESSAGE (OUTPATIENT)
Dept: INTERNAL MEDICINE CLINIC | Facility: CLINIC | Age: 74
End: 2024-07-15

## 2024-07-19 ENCOUNTER — OFFICE VISIT (OUTPATIENT)
Dept: OTOLARYNGOLOGY | Facility: CLINIC | Age: 74
End: 2024-07-19
Payer: MEDICARE

## 2024-07-19 DIAGNOSIS — H61.23 BILATERAL IMPACTED CERUMEN: Primary | ICD-10-CM

## 2024-07-19 PROCEDURE — 69210 REMOVE IMPACTED EAR WAX UNI: CPT | Performed by: OTOLARYNGOLOGY

## 2024-07-19 NOTE — PROGRESS NOTES
Ben Key is a 73 year old male.    Chief Complaint   Patient presents with    Follow - Up     Patient is here for bilateral ears cleaning        HISTORY OF PRESENT ILLNESS    Patient presents for cerumen removal. No other complaints or concerns at this time    Social History     Socioeconomic History    Marital status:    Tobacco Use    Smoking status: Former     Current packs/day: 0.00     Types: Cigarettes     Quit date: 1976     Years since quittin.3     Passive exposure: Past    Smokeless tobacco: Never    Tobacco comments:     Long time smoker, finally quit under doctor care   Vaping Use    Vaping status: Never Used   Substance and Sexual Activity    Alcohol use: Not Currently     Comment: Due to medications I have not drank for several years.    Drug use: Never   Other Topics Concern    Caffeine Concern Yes     Comment: soda 4 cans a day    Exercise No    Seat Belt No    Special Diet No    Stress Concern No    Weight Concern No    Pt has a pacemaker No    Pt has a defibrillator No    Reaction to local anesthetic No       Family History   Problem Relation Age of Onset    Other (Dementia) Mother         Late 70s    Glaucoma Mother     Other (partial colectomy) Mother     Dementia Mother         In later years    Depression Mother     Heart Disorder Father         Bypass surgery    Macular degeneration Neg     Diabetes Neg        Past Medical History:    Allergic rhinitis    Hayfever    Arthritis    Work related    Back problem    BPH (benign prostatic hyperplasia)    Depression    Eczema    Hearing impairment    bilateral    Hypotension    orthostatic    Hypotension    Lumbar disc herniation with radiculopathy    Pyogenic granuloma of skin    Left thumb pyogenic granuloma    Right leg weakness    Visual impairment    glasses       Past Surgical History:   Procedure Laterality Date    Adj tiss xfer head,fac,hand <10sqcm Left 2024    Wide excision pyogenic granuloma L thumb,flap  reconstruction    Back surgery  2/15/2021    Clean out disc    Cataract  2021    Cataract extraction w/  intraocular lens implant Left 11/16/2021    PC IOL topical IV sedation- Dr. Barriga @ [facility]    Cataract extraction w/  intraocular lens implant Right 10/19/2021    Dr. Barriga    Cataract extraction w/ intraocular lens implant Left 11/2021    Dr. Barriga     Cholecystectomy      Colonoscopy      Colonoscopy  April, 2021    Other  11/2021    left eye     Other  10/2021    right eye    Spine surgery procedure unlisted  02/15/2021    LEFT LUMBAR 4 -LUMBAR 5 MICRODISCECTOMY per Dr. Hernandez    Tonsillectomy  As a child       REVIEW OF SYSTEMS    System Neg/Pos Details   Constitutional Negative Fatigue, fever and weight loss.   ENMT Negative Drooling.   Eyes Negative Blurred vision and vision changes.   Respiratory Negative Dyspnea and wheezing.   Cardio Negative Chest pain, irregular heartbeat/palpitations and syncope.   GI Negative Abdominal pain and diarrhea.   Endocrine Negative Cold intolerance and heat intolerance.   Neuro Negative Tremors.   Psych Negative Anxiety and depression.   Integumentary Negative Frequent skin infections, pigment change and rash.   Hema/Lymph Negative Easy bleeding and easy bruising.           PHYSICAL EXAM    There were no vitals taken for this visit.       Constitutional Normal Overall appearance - Normal.        Neck Exam Normal Inspection - Normal. Palpation - Normal. Parotid gland - Normal. Thyroid gland - Normal.             Head/Face Normal Facial features - Normal. Eyebrows - Normal. Skull - Normal.             Ears Normal Inspection - Right: Normal, Left: Normal. Canal - Right: Normal, Left: Normal. TM - Right: Normal, Left: Normal.   Skin Normal Inspection - Normal.                              Canals:  Right: Canal reveals cerumen impaction,   Left: Canal reveals cerumen impaction,     Tympanic Membranes:  Right: Normal tympanic membrane.   Left: Normal tympanic membrane.     TM  Visualized Method:   Right TM examined via otomicroscopy.    Left TM examined via otomicroscopy.      PROCEDURE:    Removal of cerumen impaction   The cerumen impaction was completely removed using microscopy.   Removal was completed by using acurette and/or suction.   Comments: Return to clinic as needed.  Avoid q-tips, water precautions and use over the counter wax remedies as needed.      Current Outpatient Medications:     midodrine 5 MG Oral Tab, Take 1 tablet (5 mg total) by mouth in the morning and 1 tablet (5 mg total) at noon and 1 tablet (5 mg total) in the evening., Disp: 270 tablet, Rfl: 3    finasteride 5 MG Oral Tab, Take 1 tablet (5 mg total) by mouth daily., Disp: 90 tablet, Rfl: 3    glycopyrrolate 1 MG Oral Tab, Take 1 tablet (1 mg total) by mouth 3 (three) times daily., Disp: 90 tablet, Rfl: 1    polyethylene glycol, PEG 3350-KCl-NaBcb-NaCl-NaSulf, 236 g Oral Recon Soln, Take 4,000 mL by mouth As Directed. Take 2,000 mL the night before your procedure and 2,000 mL the morning of your procedure. Take as directed by GI clinic. Okay to substitute for generic., Disp: 1 each, Rfl: 0    Multiple Vitamins-Minerals (CENTRUM SILVER 50+MEN) Oral Tab, , Disp: , Rfl:     Upadacitinib ER (RINVOQ) 15 MG Oral Tablet 24 Hr, Take 1 tablet by mouth daily., Disp: , Rfl:     vitamin E 100 UNITS Oral Cap, Take 1 capsule (100 Units total) by mouth daily., Disp: , Rfl:     Cyanocobalamin (VITAMIN B12) 500 MCG Oral Tab, Take 500 mcg by mouth daily., Disp: , Rfl:     donepezil 10 MG Oral Tab, Take 1 tablet (10 mg total) by mouth nightly., Disp: 90 tablet, Rfl: 3    memantine 5 MG Oral Tab, Take 1 tablet (5 mg total) by mouth daily., Disp: 90 tablet, Rfl: 3    acetaminophen 500 MG Oral Tab, Take 1 tablet (500 mg total) by mouth every 4 (four) hours as needed for Fever (equal to or greater than 100.4)., Disp: , Rfl: 0    tamsulosin (FLOMAX) cap, Take 1 capsule (0.4 mg total) by mouth daily., Disp: , Rfl:     Emollient  (CERAVE) External Cream, Apply topically daily., Disp: , Rfl:     melatonin 5 MG Oral Cap, Take 1 capsule (5 mg total) by mouth nightly. (Patient not taking: Reported on 5/23/2024), Disp: , Rfl:   ASSESSMENT AND PLAN    1. Bilateral impacted cerumen        All cerumen was removed using microscopy. I have asked the patient to return to see me as needed for repeat cerumen removal in the future.      Ben Kimball MD    7/19/2024    1:21 PM

## 2024-07-22 NOTE — TELEPHONE ENCOUNTER
Requested Prescriptions     Pending Prescriptions Disp Refills    MEMANTINE 5 MG Oral Tab [Pharmacy Med Name: MEMANTINE HCL 5 MG TABLET] 90 tablet 3     Sig: TAKE 1 TABLET (5 MG TOTAL) BY MOUTH DAILY.       Date of last refill: 08/10/2023  Quantity: 90 tablet  3 refills    [place]         Last office visit: 03/14/2024  Due back to clinic per last office note:  Around 09/14/2024   Date next office visit scheduled:  09/17/2024      Last OV note recommendation:     \" ASSESSMENT:  The patient is a 73 year old man with past medical history of Alzheimer's disease, peripheral neuropathy the setting of vitamin B12 deficiency, hyperlipidemia, depression, erythroderma, lumbar radiculopathy, BPH, hearing loss who presents for follow-up.  He has a few signs of parkinsonism on exam though difficulty with finger tapping can also be seen in cognitive impairment.     -MRI brain 6/16/2023 with no acute findings, minor white matter changes, generalized volume loss; noted temporal lobe atrophy not mentioned on radiological report  -MRI cervical spine 7/17/2023 is significantly motion degraded, multilevel degenerative changes worse at C3-4 with some mass effect on the ventral cord though cord itself appears normal  -Normal/negative myasthenia gravis panel including MuSK antibodies, TSH, ammonia, aldolase, methylmalonic acid, ceruloplasmin, paraneoplastic panel, vitamin B12, RPR, vitamin B6, vitamin B1 folic acid, copper  -Minimally elevated CK  -Vitamin E deficiency   -MGUS  - --> 102   -BMP magnesium normal, vitamin E levels low  -Neuropsychological testing more indicative of major depression with mild cognitive impairment.  Primary deficit working memory.     Neurological examination was significant for MoCA of 13/30 with global deficits and a length dependent peripheral neuropathy.     Alzheimer's disease vs pseudodementia I suspect his cognitive impairment is out of proportion to  pseudodementia  Vitamin E deficiency may be contributing to cognitive impairment; vtamin E supplementation completed   Upward gaze palsy improved on exam today, making progressive supranuclear palsy less likely   Poor sleep  -PET scan   -Recommend not driving in light of cognitive changes and neuropathy versus local driving with adult present at all times  -Take break from Donepezil and Memantine to see if his sleep improves  - Recommended to discuss with PCP about a sleep study  --Future consideration: DaTScan; increase Memantine to 5 mg twice daily (discussed now but will hold off after discussion)     Length-dependent peripheral neuropathy in the setting of history of vitamin B12 deficiency, MGUS and vitamin E deficiency  Leg cramps   -Hematology follow-up   - Vitamin B12 and vitamin E supplementation      Follow up in 6 months     DAVID Martinez DO   Staff Physician, Neurology   3/14/2024  7:55 AM\"

## 2024-07-23 RX ORDER — MEMANTINE HYDROCHLORIDE 5 MG/1
5 TABLET ORAL DAILY
Qty: 90 TABLET | Refills: 3 | Status: SHIPPED | OUTPATIENT
Start: 2024-07-23 | End: 2025-07-18

## 2024-07-31 ENCOUNTER — HOSPITAL ENCOUNTER (OUTPATIENT)
Facility: HOSPITAL | Age: 74
Setting detail: HOSPITAL OUTPATIENT SURGERY
Discharge: HOME | End: 2024-07-31
Attending: INTERNAL MEDICINE | Admitting: INTERNAL MEDICINE
Payer: MEDICARE

## 2024-07-31 ENCOUNTER — ANESTHESIA (OUTPATIENT)
Dept: ENDOSCOPY | Facility: HOSPITAL | Age: 74
End: 2024-07-31
Payer: MEDICARE

## 2024-07-31 ENCOUNTER — ANESTHESIA EVENT (OUTPATIENT)
Dept: ENDOSCOPY | Facility: HOSPITAL | Age: 74
End: 2024-07-31
Payer: MEDICARE

## 2024-07-31 VITALS
SYSTOLIC BLOOD PRESSURE: 116 MMHG | DIASTOLIC BLOOD PRESSURE: 68 MMHG | WEIGHT: 193 LBS | HEART RATE: 61 BPM | HEIGHT: 70 IN | RESPIRATION RATE: 14 BRPM | OXYGEN SATURATION: 99 % | TEMPERATURE: 98 F | BODY MASS INDEX: 27.63 KG/M2

## 2024-07-31 DIAGNOSIS — K59.00 CONSTIPATION, UNSPECIFIED CONSTIPATION TYPE: ICD-10-CM

## 2024-07-31 DIAGNOSIS — Z86.010 HX OF COLONIC POLYPS: ICD-10-CM

## 2024-07-31 DIAGNOSIS — Z12.11 SCREEN FOR COLON CANCER: ICD-10-CM

## 2024-07-31 PROCEDURE — 0DBL8ZX EXCISION OF TRANSVERSE COLON, VIA NATURAL OR ARTIFICIAL OPENING ENDOSCOPIC, DIAGNOSTIC: ICD-10-PCS | Performed by: INTERNAL MEDICINE

## 2024-07-31 PROCEDURE — 45385 COLONOSCOPY W/LESION REMOVAL: CPT | Performed by: INTERNAL MEDICINE

## 2024-07-31 RX ORDER — SODIUM CHLORIDE, SODIUM LACTATE, POTASSIUM CHLORIDE, CALCIUM CHLORIDE 600; 310; 30; 20 MG/100ML; MG/100ML; MG/100ML; MG/100ML
INJECTION, SOLUTION INTRAVENOUS CONTINUOUS
Status: DISCONTINUED | OUTPATIENT
Start: 2024-07-31 | End: 2024-07-31

## 2024-07-31 RX ADMIN — SODIUM CHLORIDE, SODIUM LACTATE, POTASSIUM CHLORIDE, CALCIUM CHLORIDE: 600; 310; 30; 20 INJECTION, SOLUTION INTRAVENOUS at 11:36:00

## 2024-07-31 NOTE — ANESTHESIA POSTPROCEDURE EVALUATION
Patient: Ben Key    Procedure Summary       Date: 07/31/24 Room / Location: [facility] ENDOSCOPY 01 / [facility] ENDOSCOPY    Anesthesia Start: 1136 Anesthesia Stop:     Procedure: COLONOSCOPY Diagnosis:       Screen for colon cancer      Hx of colonic polyps      Constipation, unspecified constipation type      (Diverticulosis, Colon polyp)    Surgeons: Khai Doty MD Anesthesiologist: Ben Myers CRNA    Anesthesia Type: general ASA Status: 2            Anesthesia Type: No value filed.    Vitals Value Taken Time   /62 07/31/24 1201   Temp 98 °F (36.7 °C) 07/31/24 1201   Pulse 56 07/31/24 1201   Resp 12 07/31/24 1201   SpO2 100 % 07/31/24 1201       [facility] AN Post Evaluation:   Patient Evaluated in PACU  Patient Participation: complete - patient participated  Level of Consciousness: sleepy but conscious  Pain Score: 0  Pain Management: adequate  Airway Patency:patent  Dental exam unchanged from preop  Yes    Cardiovascular Status: acceptable  Respiratory Status: acceptable  Postoperative Hydration acceptable      Ben Myers CRNA  7/31/2024 12:01 PM

## 2024-07-31 NOTE — H&P
History & Physical Examination    Patient Name: Ben Key  MRN: [id number]  [facility]: [id number]  YOB: 1950    Diagnosis: Personal history of adenomatous colon polyps      Medications Prior to Admission   Medication Sig Dispense Refill Last Dose    MEMANTINE 5 MG Oral Tab TAKE 1 TABLET (5 MG TOTAL) BY MOUTH DAILY. 90 tablet 3     midodrine 5 MG Oral Tab Take 1 tablet (5 mg total) by mouth in the morning and 1 tablet (5 mg total) at noon and 1 tablet (5 mg total) in the evening. 270 tablet 3 7/31/2024    finasteride 5 MG Oral Tab Take 1 tablet (5 mg total) by mouth daily. 90 tablet 3 7/31/2024    Multiple Vitamins-Minerals (CENTRUM SILVER 50+MEN) Oral Tab        Upadacitinib ER (RINVOQ) 15 MG Oral Tablet 24 Hr Take 1 tablet by mouth daily.   7/31/2024    melatonin 5 MG Oral Cap Take 1 capsule (5 mg total) by mouth nightly.       vitamin E 100 UNITS Oral Cap Take 1 capsule (100 Units total) by mouth daily.       Cyanocobalamin (VITAMIN B12) 500 MCG Oral Tab Take 500 mcg by mouth daily.       donepezil 10 MG Oral Tab Take 1 tablet (10 mg total) by mouth nightly. 90 tablet 3     acetaminophen 500 MG Oral Tab Take 1 tablet (500 mg total) by mouth every 4 (four) hours as needed for Fever (equal to or greater than 100.4).  0 7/31/2024    glycopyrrolate 1 MG Oral Tab Take 1 tablet (1 mg total) by mouth 3 (three) times daily. (Patient not taking: Reported on 7/25/2024) 90 tablet 1 Not Taking    polyethylene glycol, PEG 3350-KCl-NaBcb-NaCl-NaSulf, 236 g Oral Recon Soln Take 4,000 mL by mouth As Directed. Take 2,000 mL the night before your procedure and 2,000 mL the morning of your procedure. Take as directed by GI clinic. Okay to substitute for generic. 1 each 0     tamsulosin (FLOMAX) cap Take 1 capsule (0.4 mg total) by mouth daily. (Patient not taking: Reported on 7/25/2024)   Not Taking    Emollient (CERAVE) External Cream Apply topically daily.        Current Facility-Administered Medications    Medication Dose Route Frequency    lactated ringers infusion   Intravenous Continuous       Allergies:   Allergies   Allergen Reactions    Dupilumab SWELLING and Tightness in Throat    Mildew OTHER (SEE COMMENTS)     Sneezing    Mold OTHER (SEE COMMENTS)     Sneezing       Past Medical History:    Allergic rhinitis    Hayfever    Arthritis    Work related    Back problem    BPH (benign prostatic hyperplasia)    Depression    Eczema    Hearing impairment    bilateral    Hypotension    orthostatic    Hypotension    Lumbar disc herniation with radiculopathy    Pyogenic granuloma of skin    Left thumb pyogenic granuloma    Right leg weakness    Visual impairment    glasses     Past Surgical History:   Procedure Laterality Date    Adj tiss xfer head,fac,hand <10sqcm Left 2024    Wide excision pyogenic granuloma L thumb,flap reconstruction    Back surgery  2/15/2021    Clean out disc    Cataract      Cataract extraction w/  intraocular lens implant Left 2021    PC IOL topical IV sedation- Dr. Barriga @ [facility]    Cataract extraction w/  intraocular lens implant Right 10/19/2021    Dr. Barriga    Cataract extraction w/ intraocular lens implant Left 2021    Dr. Barriga     Cholecystectomy      Colonoscopy      Colonoscopy      Other  2021    left eye     Other  10/2021    right eye    Spine surgery procedure unlisted  02/15/2021    LEFT LUMBAR 4 -LUMBAR 5 MICRODISCECTOMY per Dr. Hernandez    Tonsillectomy  As a child     Family History   Problem Relation Age of Onset    Other (Dementia) Mother         Late 70s    Glaucoma Mother     Other (partial colectomy) Mother     Dementia Mother         In later years    Depression Mother     Heart Disorder Father         Bypass surgery    Macular degeneration Neg     Diabetes Neg      Social History     Tobacco Use    Smoking status: Former     Current packs/day: 0.00     Types: Cigarettes     Quit date: 1976     Years since quittin.3     Passive exposure:  Past    Smokeless tobacco: Never    Tobacco comments:     Long time smoker, finally quit under doctor care   Substance Use Topics    Alcohol use: Not Currently     Comment: Due to medications I have not drank for several years.       SYSTEM Check if Review is Normal Check if Physical Exam is Normal If not normal, please explain:   HEENT [X ] [ X]    NECK  [X ] [ X]    HEART [X ] [ X]    LUNGS [X ] [ X]    ABDOMEN [X ] [ X]    EXTREMITIES [X ] [ X]    OTHER        [ x ] I have discussed the risks and benefits and alternatives with the patient/family.  They understand and agree to proceed with plan of care.  [ x ] I have reviewed the History and Physical done within the last 30 days.  Any changes noted above.    Khai Doty MD  7/31/2024  11:36 AM

## 2024-07-31 NOTE — ANESTHESIA PREPROCEDURE EVALUATION
Anesthesia PreOp Note    HPI:     Ben Key is a 73 year old male who presents for preoperative consultation requested by: Khai Doty MD    Date of Surgery: 7/31/2024    Procedure(s):  COLONOSCOPY  Indication: Screen for colon cancer / Hx of colonic polyps / Constipation, unspecified constipation type    Relevant Problems   No relevant active problems       NPO:  Last Liquid Consumption Date: 07/31/24  Last Liquid Consumption Time: 0630  Last Solid Consumption Date: 07/30/24  Last Solid Consumption Time: 0500  Last Liquid Consumption Date: 07/31/24          History Review:  Patient Active Problem List    Diagnosis Date Noted    Observation for suspected condition 05/07/2024    Ataxia 07/16/2023    Pseudophakia of both eyes 06/15/2022    Visual impairment     Right leg weakness     Lumbar disc herniation with radiculopathy     Eczema     BPH (benign prostatic hyperplasia)     Age-related nuclear cataract of both eyes 03/24/2021    Floaters in visual field, bilateral 03/24/2021    S/P lumbar microdiscectomy     Chronic left lumbar radiculopathy 01/05/2021    HNP (herniated nucleus pulposus), lumbar 01/05/2021    Poor posture 01/05/2021    Sleep disturbance 01/05/2021    Ganglion cyst of wrist, left 07/26/2018    Smoker 09/07/2016    Psoriasis 09/07/2016    Dyslipidemia 09/07/2016       Past Medical History:    Allergic rhinitis    Hayfever    Arthritis    Work related    Back problem    BPH (benign prostatic hyperplasia)    Depression    Eczema    Hearing impairment    bilateral    Hypotension    orthostatic    Hypotension    Lumbar disc herniation with radiculopathy    Pyogenic granuloma of skin    Left thumb pyogenic granuloma    Right leg weakness    Visual impairment    glasses       Past Surgical History:   Procedure Laterality Date    Adj tiss xfer head,fac,hand <10sqcm Left 05/07/2024    Wide excision pyogenic granuloma L thumb,flap reconstruction    Back surgery  2/15/2021    Clean out disc     Cataract  2021    Cataract extraction w/  intraocular lens implant Left 11/16/2021    PC IOL topical IV sedation- Dr. Barriga @ [facility]    Cataract extraction w/  intraocular lens implant Right 10/19/2021    Dr. Barriga    Cataract extraction w/ intraocular lens implant Left 11/2021    Dr. Barriga     Cholecystectomy      Colonoscopy      Colonoscopy  April, 2021    Other  11/2021    left eye     Other  10/2021    right eye    Spine surgery procedure unlisted  02/15/2021    LEFT LUMBAR 4 -LUMBAR 5 MICRODISCECTOMY per Dr. Hernandez    Tonsillectomy  As a child       Medications Prior to Admission   Medication Sig Dispense Refill Last Dose    MEMANTINE 5 MG Oral Tab TAKE 1 TABLET (5 MG TOTAL) BY MOUTH DAILY. 90 tablet 3     midodrine 5 MG Oral Tab Take 1 tablet (5 mg total) by mouth in the morning and 1 tablet (5 mg total) at noon and 1 tablet (5 mg total) in the evening. 270 tablet 3     finasteride 5 MG Oral Tab Take 1 tablet (5 mg total) by mouth daily. 90 tablet 3     Multiple Vitamins-Minerals (CENTRUM SILVER 50+MEN) Oral Tab        Upadacitinib ER (RINVOQ) 15 MG Oral Tablet 24 Hr Take 1 tablet by mouth daily.       melatonin 5 MG Oral Cap Take 1 capsule (5 mg total) by mouth nightly.       vitamin E 100 UNITS Oral Cap Take 1 capsule (100 Units total) by mouth daily.       Cyanocobalamin (VITAMIN B12) 500 MCG Oral Tab Take 500 mcg by mouth daily.       donepezil 10 MG Oral Tab Take 1 tablet (10 mg total) by mouth nightly. 90 tablet 3     acetaminophen 500 MG Oral Tab Take 1 tablet (500 mg total) by mouth every 4 (four) hours as needed for Fever (equal to or greater than 100.4).  0     glycopyrrolate 1 MG Oral Tab Take 1 tablet (1 mg total) by mouth 3 (three) times daily. (Patient not taking: Reported on 7/25/2024) 90 tablet 1 Not Taking    polyethylene glycol, PEG 3350-KCl-NaBcb-NaCl-NaSulf, 236 g Oral Recon Soln Take 4,000 mL by mouth As Directed. Take 2,000 mL the night before your procedure and 2,000 mL the morning of your  procedure. Take as directed by GI clinic. Okay to substitute for generic. 1 each 0     tamsulosin (FLOMAX) cap Take 1 capsule (0.4 mg total) by mouth daily. (Patient not taking: Reported on 2024)   Not Taking    Emollient (CERAVE) External Cream Apply topically daily.        Current Facility-Administered Medications Ordered in Epic   Medication Dose Route Frequency Provider Last Rate Last Admin    lactated ringers infusion   Intravenous Continuous Khai Doty MD         No current [facility]-ordered outpatient medications on file.       Allergies   Allergen Reactions    Dupilumab SWELLING and Tightness in Throat    Mildew OTHER (SEE COMMENTS)     Sneezing    Mold OTHER (SEE COMMENTS)     Sneezing       Family History   Problem Relation Age of Onset    Other (Dementia) Mother         Late 70s    Glaucoma Mother     Other (partial colectomy) Mother     Dementia Mother         In later years    Depression Mother     Heart Disorder Father         Bypass surgery    Macular degeneration Neg     Diabetes Neg      Social History     Socioeconomic History    Marital status:    Tobacco Use    Smoking status: Former     Current packs/day: 0.00     Types: Cigarettes     Quit date: 1976     Years since quittin.3     Passive exposure: Past    Smokeless tobacco: Never    Tobacco comments:     Long time smoker, finally quit under doctor care   Vaping Use    Vaping status: Never Used   Substance and Sexual Activity    Alcohol use: Not Currently     Comment: Due to medications I have not drank for several years.    Drug use: Never   Other Topics Concern    Caffeine Concern Yes     Comment: soda 4 cans a day    Exercise No    Seat Belt No    Special Diet No    Stress Concern No    Weight Concern No    Pt has a pacemaker No    Pt has a defibrillator No    Reaction to local anesthetic No       Available pre-op labs reviewed.             Vital Signs:  Body mass index is 27.69 kg/m².   height is 1.778 m (5' 10\")  and weight is 87.5 kg (193 lb).   Vitals:    07/25/24 1317   Weight: 87.5 kg (193 lb)   Height: 1.778 m (5' 10\")        Anesthesia Evaluation     Patient summary reviewed and Nursing notes reviewed    Airway   Mallampati: II  TM distance: >3 FB  Neck ROM: full  Dental      Pulmonary - negative ROS and normal exam   Cardiovascular - negative ROS and normal exam    Neuro/Psych    (+)  neuromuscular disease,  depression      GI/Hepatic/Renal - negative ROS     Endo/Other - negative ROS   Abdominal  - normal exam                 Anesthesia Plan:   ASA:  2  Plan:   General  Informed Consent Plan and Risks Discussed With:  Patient      I have informed Ben Key and/or legal guardian or family member of the nature of the anesthetic plan, benefits, risks including possible dental damage if relevant, major complications, and any alternative forms of anesthetic management.   All of the patient's questions were answered to the best of my ability. The patient desires the anesthetic management as planned.  Ben Myers CRNA  7/31/2024 10:22 AM  Present on Admission:  **None**

## 2024-07-31 NOTE — OPERATIVE REPORT
[facility] Endoscopy Report      Date of Procedure:  07/31/24      Preoperative Diagnosis:  Personal history of adenomatous colon polyps      Postoperative Diagnosis:  .  1.  Colon polyp  2.  Diverticulosis right and left colon  3.  Nonbleeding cecal vascular malformation      Procedure:    Colonoscopy with polypectomy      Surgeon:  Khai Doty M.D.      Anesthesia:  Monitored anesthesia care  Cecal withdrawal time: 15 minutes  EBL:  Insignificant      Brief History:  This is a 73 year old male who presents for a surveillance colonoscopy in the setting of a history of colon polyps (#9) removed endoscopically 3 years prior.  Other than a tendency towards constipation the patient has been asymptomatic from a lower gastrointestinal tract standpoint.      Technique:  After informed consent, the patient was placed in the left lateral recumbent position.  Digital rectal examination revealed no palpable intraluminal abnormalities.  An Olympus variable stiffness 190 series HD colonoscope was inserted into the rectum and advanced under direct vision by following the lumen to the terminal ileum.  The colon was examined upon withdrawal in the left lateral recumbent position.      Findings:  The preparation of the colon was excellent.  The terminal ileum was examined for several cm and visually normal.  The ileocecal valve was well preserved. The visualized colonic mucosa from the cecum to the anal verge was normal with an intact vascular pattern.  In the proximal transverse colon there was a 5-6 mm sessile polyp which was cold snare excised and retrieved.  No ongoing bleeding.  Diverticula were seen in at least the right and left colon without current signs of complication.  There was a 3 mm fiery red vascular malformation in the cecum that was not bleeding and left undisturbed.  There were no other colonic polyps, mass lesions, other vascular anomalies or signs of inflammation seen.  Retroflexion  in the right, proximal transverse colon and rectum revealed no abnormalities.  The procedure was well tolerated without immediate complication.      Impression:  1.  Colon polyp  2.  Uncomplicated diverticulosis in at least the right and left colon  3.  Nonbleeding cecal vascular malformation left undisturbed    Recommendations:  1.  High-fiber diet.  2.  Follow-up biopsy results.  3.  Probable observation unless any new symptoms or signs are noted.        Khai Doty MD  7/31/2024  12:03 PM

## 2024-08-01 ENCOUNTER — TELEPHONE (OUTPATIENT)
Facility: CLINIC | Age: 74
End: 2024-08-01

## 2024-08-01 NOTE — TELEPHONE ENCOUNTER
----- Message from Khai Lumadeleine sent at 7/31/2024  6:20 PM CDT -----  As per JESÚS parameters I spoke to the patient's wife Ирина.  The patient is feeling well.  He had a solitary subcentimeter tubular adenoma removed.  Uncomplicated diverticulosis was present.  He had previous adenomas removed as well.  I recommend a high-fiber diet for the diverticulosis.  Normally a surveillance colonoscopy would be advised in 5 years, however, in light of the patient's age and cognitive communication deficit observation would not be unreasonable.    GI RNs: Please enter in health maintenance that a colonoscopy was performed.  No recall.

## 2024-08-12 NOTE — TELEPHONE ENCOUNTER
Requested Prescriptions     Pending Prescriptions Disp Refills    DONEPEZIL 10 MG Oral Tab [Pharmacy Med Name: DONEPEZIL HCL 10 MG TABLET] 90 tablet 3     Sig: TAKE 1 TABLET BY MOUTH EVERY DAY AT NIGHT     Last OV:     Next OV: 9/17/2024    [place]/;

## 2024-08-14 RX ORDER — DONEPEZIL HYDROCHLORIDE 10 MG/1
10 TABLET, FILM COATED ORAL NIGHTLY
Qty: 90 TABLET | Refills: 3 | Status: SHIPPED | OUTPATIENT
Start: 2024-08-14

## 2024-09-22 NOTE — PATIENT INSTRUCTIONS
He was seen in clinic today for follow-up.  Today, we did do a check up on our ongoing management  - We are happy to hear that the memory changes have stabilized.  Lets continue with the same medication namely the memantine    Lets keep a close eye on the neck pain and manage conservatively:  -Acetaminophen 500-650 mg every 4-6 hours as needed for pain relief. Maximum dosage is 3000 mg in 24 hours  - For only severe episodes of pain, you may consider use of ibuprofen/Advil no more than 1-2 times a day especially if the pain level is 7/10 or higher.  Long-term use of this medication can cause problems with the stomach, kidneys.  We should try to minimize use overall to reduce these effects.  -For more severe pain, notify us as we can consider alternative medications  -Trial of home stretches and exercises.  Physical therapy may be the next step    Lets proceed with an x-ray of the low back and the hips.  There may be some progression of arthritis in the area as I suspect this is related to sciatica given our history of the back.  - Also focus on stretches and exercises of the iliotibial band.    Keep a close eye on orthostatic hypotension.  Lets maintain adequate water intake  - Periodically check blood pressures at home, notify us if increasing    Return to clinic in 3 months for Medicare annual physical exam

## 2024-09-22 NOTE — PROGRESS NOTES
Chief Complaint:   Chief Complaint   Patient presents with    Checkup     6 month         HPI:     Mr. MAY is a 73 year old male PMHX MGUS, psoriasis, hyperlipidemia, chronic low back pain, erythroderma, cognitive impairment coming in for follow-up.    Overall doing well. Trouble with walking on the R side, hip and thigh. Unable to follow her with the cart all the way. Inside the muscle on R hip and R thigh. At least a few months.Throbbing sensation. Can use tylenol. Lower back pain is about the same. Intermittent sciatica of the R leg. Has been shuffling with walking - not as fast as he used.     The hernias have not changed since the last visit.    The dizziness or lightheadedness episodes have improved. Less frequent. Improved after the cessation of the flomax. Frequen    Getting leg cramps usually in the morning. IN the back of the knee. Feels more deep in the leg, towards the side    Past Medical History:    Allergic rhinitis    Hayfever    Arthritis    Work related    Back problem    BPH (benign prostatic hyperplasia)    Depression    Eczema    Hearing impairment    bilateral    Hypotension    orthostatic    Hypotension    Lumbar disc herniation with radiculopathy    Pyogenic granuloma of skin    Left thumb pyogenic granuloma    Right leg weakness    Visual impairment    glasses     Past Surgical History:   Procedure Laterality Date    Adj tiss xfer head,fac,hand <10sqcm Left 05/07/2024    Wide excision pyogenic granuloma L thumb,flap reconstruction    Back surgery  2/15/2021    Clean out disc    Cataract  2021    Cataract extraction w/  intraocular lens implant Left 11/16/2021    PC IOL topical IV sedation- Dr. Barriga @ [facility]    Cataract extraction w/  intraocular lens implant Right 10/19/2021    Dr. Barriga    Cataract extraction w/ intraocular lens implant Left 11/2021    Dr. Barriga     Cholecystectomy      Colonoscopy      Colonoscopy  April, 2021    Colonoscopy N/A 7/31/2024    Procedure: COLONOSCOPY;  Surgeon:  Khai Doty MD;  Location: [facility] ENDOSCOPY    Other  2021    left eye     Other  10/2021    right eye    Spine surgery procedure unlisted  02/15/2021    LEFT LUMBAR 4 -LUMBAR 5 MICRODISCECTOMY per Dr. Hernandez    Tonsillectomy  As a child     Social History:  Social History     Socioeconomic History    Marital status:    Tobacco Use    Smoking status: Former     Current packs/day: 0.00     Types: Cigarettes     Quit date: 1976     Years since quittin.5     Passive exposure: Past    Smokeless tobacco: Never    Tobacco comments:     Long time smoker, finally quit under doctor care   Vaping Use    Vaping status: Never Used   Substance and Sexual Activity    Alcohol use: Not Currently     Comment: Due to medications I have not drank for several years.    Drug use: Never   Other Topics Concern    Caffeine Concern Yes     Comment: soda 4 cans a day    Exercise No    Seat Belt No    Special Diet No    Stress Concern No    Weight Concern No    Pt has a pacemaker No    Pt has a defibrillator No    Reaction to local anesthetic No     Social Determinants of Health     Financial Resource Strain: Low Risk  (2023)    [facility]    Overall Financial Resource Strain (CARDIA)     Difficulty of Paying Living Expenses: Not hard at all   Food Insecurity: No Food Insecurity (2023)    [facility], [facility]    Hunger Vital Sign     Worried About Running Out of Food in the Last Year: Never true     Ran Out of Food in the Last Year: Never true   Transportation Needs: No Transportation Needs (2023)    [facility], [facility]    PRAPARE - Transportation     Lack of Transportation (Medical): No     Lack of Transportation (Non-Medical): No   Housing Stability: Unknown (6/3/2022)    Received from [facility]  [facility], [facility]    Housing Stability Vital Sign     Unable to Pay for Housing in the Last Year: No     Unstable Housing in the Last Year: No     Family History:  Family History   Problem Relation Age of Onset    Other (Dementia) Mother         Late 70s    Glaucoma Mother     Other (partial colectomy) Mother     Dementia Mother         In later years    Depression Mother     Heart Disorder Father         Bypass surgery    Macular degeneration Neg     Diabetes Neg      Allergies:  Allergies   Allergen Reactions    Dupilumab SWELLING and Tightness in Throat    Mildew OTHER (SEE COMMENTS)     Sneezing    Mold OTHER (SEE COMMENTS)     Sneezing     Current Meds:  Current Outpatient Medications   Medication Sig Dispense Refill    memantine 10 MG Oral Tab Take 1 tablet (10 mg total) by mouth daily. 90 tablet 3    DONEPEZIL 10 MG Oral Tab TAKE 1 TABLET BY MOUTH EVERY DAY AT NIGHT 90 tablet 3    midodrine 5 MG Oral Tab Take 1 tablet (5 mg total) by mouth in the morning and 1 tablet (5 mg total) at noon and 1 tablet (5 mg total) in the evening. 270 tablet 3    finasteride 5 MG Oral Tab Take 1 tablet (5 mg total) by mouth daily. 90 tablet 3    Multiple Vitamins-Minerals (CENTRUM SILVER 50+MEN) Oral Tab       Upadacitinib ER (RINVOQ) 15 MG Oral Tablet 24 Hr Take 1 tablet by mouth daily.      melatonin 5 MG Oral Cap Take 1 capsule (5 mg total) by mouth nightly.      Cyanocobalamin (VITAMIN B12) 500 MCG Oral Tab Take 500 mcg by mouth daily.      acetaminophen 500 MG Oral Tab Take 1 tablet (500 mg total) by mouth in the morning, at noon, and at bedtime.  0    Emollient (CERAVE) External Cream Apply topically daily. (Patient not taking: Reported on 9/24/2024)        Counseling given: Not Answered  Tobacco comments: Long time smoker, finally quit under doctor care       REVIEW OF SYSTEMS:   Positive Findings indicated in BOLD    Constitutional: Fever, Chills, Weight Gain, Weight Loss, Night Sweats,  Fatigue, Malaise  ENT/Mouth:  Hearing Changes, Ear Pain, Nasal Congestion, Sinus Pain, Hoarseness, Sore throat, Rhinorrhea, Swallowing Difficulty,   Eyes: Eye Pain, Swelling, Redness, Foreign Body, Discharge, Vision Changes  Cardiovascular: Chest Pain, SOB, PND, Dyspnea on Exertion, Orthopnea, Claudication, Edema, Palpitations  Respiratory: Cough, Sputum, Wheezing, Shortness of breath  Gastrointestinal: Nausea, Vomiting, Diarrhea, Constipation, Pain, Heartburn, Dysphagia, Bloody stools, Tarry stools  Genitourinary: Dysmenorrhea, Dysuria, Urinary Frequency, Hematuria, Urinary Incontinence, Urgency,  Flank Pain  Musculoskeletal: Arthralgias, Myalgias, Joint Swelling, Joint Stiffness, Back Pain, Neck Pain, Leg cramps  Integumentary: Skin Lesions, Pruritis, Hair Changes, Jaundice, Nail changes  Neuro: Weakness, Numbness, Paresthesias, Loss of Consciousness, Syncope, Dizziness, Headache, Falls  Psych: Anxiety, Depression, Insomnia, Suicidal Ideation, Homicidal ideation, Memory Changes  Heme/Lymph: Bruising, Bleeding, Lymphadenopathy  Endocrine: Polyuria, Polydipsia, Temperature Intolerance    EXAM:   Vital Signs:  Blood pressure 110/60, pulse 64, temperature 98 °F (36.7 °C), temperature source Oral, height 5' 10\" (1.778 m), weight 189 lb (85.7 kg).     Constitutional: No acute distress. Alert and oriented x 3.  Eyes: EOMI, PERRLA, clear sclera b/l  HENT: NCAT, Moist mucous membranes, Oropharynx without erythema or exudates  Neck: No JVD, no thyromegaly  Cardiovascular: S1, S2, no S3, no S4, Regular rate and rhythm, No murmurs/gallops/rubs.   Respiratory: Clear to auscultation bilaterally.  No wheezes/rales/rhonchi  Gastrointestinal: Soft, nontender, nondistended. Positive bowel sounds x 4. No rebound tenderness. No hepatomegaly, No splenomegaly  Genitourinary: No CVA tenderness bilaterally  Neurologic: No focal neurological deficits, CN II-XII intact, light touch intact  Musculoskeletal: Full range of motion of all  extremities, no clubbing/swelling/edema  + Straight leg raise positive on the right  Skin: No lesions, No erythema, no jaundice, Cap Refill < 2s  Psychiatric: Appropriate mood and affect  Heme/Lymph/Immune: No cervical LAD    DATA REVIEWED   Labs:  Recent Results (from the past 8760 hour(s))   COMP METABOLIC PANEL [E]    Collection Time: 03/01/24  8:01 AM   Result Value Ref Range    Glucose 103 (H) 70 - 99 mg/dL    Sodium 142 136 - 145 mmol/L    Potassium 4.1 3.5 - 5.1 mmol/L    Chloride 111 98 - 112 mmol/L    CO2 25.0 21.0 - 32.0 mmol/L    Anion Gap 6 0 - 18 mmol/L    BUN 24 (H) 9 - 23 mg/dL    Creatinine 1.24 0.70 - 1.30 mg/dL    BUN/CREA Ratio 19.4 10.0 - 20.0    Calcium, Total 9.4 8.7 - 10.4 mg/dL    Calculated Osmolality 298 (H) 275 - 295 mOsm/kg    eGFR-Cr 61 >=60 mL/min/1.73m2    ALT 27 10 - 49 U/L    AST 26 <=34 U/L    Alkaline Phosphatase 80 45 - 117 U/L    Bilirubin, Total 0.5 0.2 - 1.1 mg/dL    Total Protein 7.0 5.7 - 8.2 g/dL    Albumin 4.1 3.2 - 4.8 g/dL    Globulin  2.9 2.8 - 4.4 g/dL    A/G Ratio 1.4 1.0 - 2.0    Patient Fasting for CMP? No      *Note: Due to a large number of results and/or encounters for the requested time period, some results have not been displayed. A complete set of results can be found in Results Review.       Recent Results (from the past 8760 hour(s))   CBC W/ DIFFERENTIAL    Collection Time: 03/01/24  8:01 AM   Result Value Ref Range    WBC 6.4 4.0 - 11.0 x10(3) uL    RBC 4.07 3.80 - 5.80 x10(6)uL    HGB 13.6 13.0 - 17.5 g/dL    HCT 39.6 39.0 - 53.0 %    MCV 97.3 80.0 - 100.0 fL    MCH 33.4 26.0 - 34.0 pg    MCHC 34.3 31.0 - 37.0 g/dL    RDW-SD 44.8 35.1 - 46.3 fL    RDW 12.5 11.0 - 15.0 %    .0 150.0 - 450.0 10(3)uL    Neutrophil Absolute Prelim 4.70 1.50 - 7.70 x10 (3) uL    Neutrophil Absolute 4.70 1.50 - 7.70 x10(3) uL    Lymphocyte Absolute 0.92 (L) 1.00 - 4.00 x10(3) uL    Monocyte Absolute 0.58 0.10 - 1.00 x10(3) uL    Eosinophil Absolute 0.13 0.00 - 0.70 x10(3)  uL    Basophil Absolute 0.03 0.00 - 0.20 x10(3) uL    Immature Granulocyte Absolute 0.02 0.00 - 1.00 x10(3) uL    Neutrophil % 73.7 %    Lymphocyte % 14.4 %    Monocyte % 9.1 %    Eosinophil % 2.0 %    Basophil % 0.5 %    Immature Granulocyte % 0.3 %     *Note: Due to a large number of results and/or encounters for the requested time period, some results have not been displayed. A complete set of results can be found in Results Review.       Imaging:  MRI cervical spine 7/17/2023    Impression   CONCLUSION:        No cord signal abnormality or evidence of abnormal intrathecal enhancement within the limitations of motion artifact.       Multilevel degenerative changes of the cervical spine, which are most advanced at C3-4 with a disc osteophyte complex and superimposed right paracentral disc extrusion resulting in moderate to severe canal stenosis with the extruded disc causing max  effect on the ventral cord, severe right and moderate left axillary recess stenosis, and severe right and moderate left foraminal narrowing .  The remaining levels are described in detail above.          Ultrasound head and neck 4/16/2024  Findings and impression:  There is no solid mass/adenopathy or fluid collection in the soft tissues of the posterior right neck at the site of palpable abnormality       Surgical pathology 5/7/2024       Left thumb lesion; wide excision:   Skin with subepithelial ulcerated pyogenic granuloma, seen focally at specimen inked margin.  No evidence of atypia or malignancy is identified.     Colonoscopy 7/31/2024  Final Diagnosis:      Transverse colon polyp x1:  Tubular adenoma.               ASSESSMENT AND PLAN:     Pyogenic granuloma  Of note, was seen by Dr. Byrnes for laceration to the left thumb without known injury.  Was seen in urgent care 4/20/2024 for which 1 suture was placed.  However had recurrent episodes of bleeding requiring redressing.  Did see Dr. Gold of hand surgery and underwent  wide excision lesion left thumb flap reconstruction 5/7/2024 and tolerated surgery well.  - Seems to be healing well  - Ongoing occupational therapy as recommended by Dr. 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 Martinez.  Possible Florinef/Mestinon incorporation  - We will switch Flomax to finasteride as below    Low back pain  Right leg pain  - Suspect that this is localized more to the lower lumbar back, possibly the hips but exam seems to be reassuring.  - Acetaminophen 500-650 mg every 4-6 hours as needed for pain relief. Maximum dosage is 3000 mg in 24 hours  - For only severe episodes of pain, you may consider use of ibuprofen/Advil no more than 1-2 times a day especially if the pain level is 7/10 or higher.  Long-term use of this medication can cause problems with the stomach, kidneys.  We should try to minimize use overall to reduce these effects.  -For more severe pain, notify us as we can consider alternative medications  -Trial of home stretches and exercises.  Physical therapy may be the next step.  Would like to hold off on this for now    Neck pain  May be due to muscle strain, Lower suspicion cervical radiculopathy from cervical spinal stenosis.  Acute on chronic osteoarthritis pain  - On examination there may be some prominent soft tissue along old R cervical chain biopsy site -recommend checking ultrasound of the right neck area  - Cervical spine MRI 7/17: Multilevel degenerative changes of the cervical spine, most advanced at C3-C4 with disc osteophyte complex and superimposed right paracentral disc extrusion.  This is resulting in moderate to severe canal stenosis with extruded disc causing mass effect on the ventral  cord  -Similar comanagement with pain as above  -This remains stable    Muscle cramps  -Continue medical management with the use of muscle relaxers as above.    Excessive Salivation  -Unclear cause, possibly due to medication side effects  - We will try glycopyrrolate 1 tablet up to 3 times a day as needed.  Should try taking it a time first, monitor for excessive dryness of the mouth.  - There is slight interaction with donepezil, but glycopyrrolate should be okay to use in the short-term..     MGUS  Underwent neurological workup inpatient for hospitalization in 2023.  Noted to have free light chain kappa 3.2, lambda 3.5 with ratio 0.91.  Electrophoresis with IgG lambda monoclonal suspicious for MGUS.  - Established with Dr. Bustamante, last seen 3/8/2024.  Seems to be stable, not contributing to autonomic dysfunction/neuropathy.  Ongoing yearly follow-up.     Psoriasis  - On upadacitinib 15 mg daily -overall well-controlled.  - Will discuss with their dermatologist at [place]     Hyperlipidemia  - Lipid panel with   - Not currently on statin medication     History of colon polyps  -Last colonoscopy 7/31/2024-tubular adenoma noted for which repeat recommended in 5 years with Dr. Dow     Chronic low back pain with radiculopathy  History of lumbar microdiscectomy  -Recently seen by Dr. Oviedo 12/26/2023 due to worsening back pain.  Treated with Medrol Dosepak, muscle relaxers, ongoing conservative measures.  -Comanagement as above    BPH  - Some relief with the flomax, however will try to improve symptoms with finasteride 5 mg once a day in the event this is contributing to orthostatic hypotension.  - Last PSA at goal     Vitamin D deficiency  - Will check vitamin D level  - Continue with     Erythroderma  - Seems to be well-controlled, under surveillance by dermatology Dr. Denise Lantigua     Eczema  - Seems to be controlled on CeraVe  - On Rinvoq     Dementia  Suspected Alzheimer's dementia.  Following  with neurology.  Workup has included MRI brain without significant acute findings temporal atrophy and generalized volume loss.  Also had MRI cervical spine with multilevel degenerative images.  Full secondary workup largely unremarkable.  Completed neuropsychological test  -Noted vitamin D deficiency  - Last seen by Dr. Martinez 9/17/2024, patient declined PET scan.  Neuropsychological testing indicative of major depression with mild cognitive impairment.  Memantine was increased to 10 mg once a day     Neuropathy  Multifactorial due to vitamin B12 deficiency, MGUS, vitamin D deficiency  - Ongoing vitamin B12 and vitamin D supplementation  -Seems to be a prominent symptom but stable for now         Orders This Visit:  No orders of the defined types were placed in this encounter.      Meds This Visit:  Requested Prescriptions      No prescriptions requested or ordered in this encounter       Imaging & Referrals:  None     Health Maintenance  Colonoscopy scheduled 7/31/2024 with Dr. Dow    Spent 40 minutes obtaining history, evaluating patient, discussing treatment options, diet, exercise, review of available labs and radiology reports, and completing documentation.       Return to clinic in 3-4 months    Gabriel Rodríguez MD, 09/24/24, 3:05 PM

## 2024-09-24 ENCOUNTER — OFFICE VISIT (OUTPATIENT)
Dept: INTERNAL MEDICINE CLINIC | Facility: CLINIC | Age: 74
End: 2024-09-24

## 2024-09-24 VITALS
SYSTOLIC BLOOD PRESSURE: 110 MMHG | WEIGHT: 189 LBS | HEART RATE: 64 BPM | BODY MASS INDEX: 27.06 KG/M2 | DIASTOLIC BLOOD PRESSURE: 60 MMHG | TEMPERATURE: 98 F | HEIGHT: 70 IN

## 2024-09-24 DIAGNOSIS — L40.9 PSORIASIS: ICD-10-CM

## 2024-09-24 DIAGNOSIS — F02.B0 MODERATE ALZHEIMER'S DEMENTIA WITHOUT BEHAVIORAL DISTURBANCE, PSYCHOTIC DISTURBANCE, MOOD DISTURBANCE, OR ANXIETY, UNSPECIFIED TIMING OF DEMENTIA ONSET (HCC): ICD-10-CM

## 2024-09-24 DIAGNOSIS — M54.2 NECK PAIN: ICD-10-CM

## 2024-09-24 DIAGNOSIS — G30.9 MODERATE ALZHEIMER'S DEMENTIA WITHOUT BEHAVIORAL DISTURBANCE, PSYCHOTIC DISTURBANCE, MOOD DISTURBANCE, OR ANXIETY, UNSPECIFIED TIMING OF DEMENTIA ONSET (HCC): ICD-10-CM

## 2024-09-24 DIAGNOSIS — M51.16 LUMBAR DISC HERNIATION WITH RADICULOPATHY: ICD-10-CM

## 2024-09-24 DIAGNOSIS — N40.0 BENIGN PROSTATIC HYPERPLASIA, UNSPECIFIED WHETHER LOWER URINARY TRACT SYMPTOMS PRESENT: ICD-10-CM

## 2024-09-24 DIAGNOSIS — M48.02 CERVICAL SPINAL STENOSIS: ICD-10-CM

## 2024-09-24 DIAGNOSIS — L98.0 PYOGENIC GRANULOMA: Primary | ICD-10-CM

## 2024-09-24 DIAGNOSIS — E78.2 MIXED HYPERLIPIDEMIA: ICD-10-CM

## 2024-09-24 DIAGNOSIS — Z86.010 HISTORY OF COLON POLYPS: ICD-10-CM

## 2024-09-24 DIAGNOSIS — M54.41 ACUTE BILATERAL LOW BACK PAIN WITH RIGHT-SIDED SCIATICA: ICD-10-CM

## 2024-09-24 DIAGNOSIS — G62.9 NEUROPATHY: ICD-10-CM

## 2024-09-24 DIAGNOSIS — I95.1 ORTHOSTATIC HYPOTENSION: ICD-10-CM

## 2024-09-24 PROCEDURE — 99215 OFFICE O/P EST HI 40 MIN: CPT | Performed by: INTERNAL MEDICINE

## 2024-10-02 ENCOUNTER — HOSPITAL ENCOUNTER (OUTPATIENT)
Dept: GENERAL RADIOLOGY | Facility: HOSPITAL | Age: 74
Discharge: HOME | End: 2024-10-02
Attending: INTERNAL MEDICINE
Payer: MEDICARE

## 2024-10-02 DIAGNOSIS — M54.41 ACUTE BILATERAL LOW BACK PAIN WITH RIGHT-SIDED SCIATICA: ICD-10-CM

## 2024-10-02 DIAGNOSIS — M51.16 LUMBAR DISC HERNIATION WITH RADICULOPATHY: ICD-10-CM

## 2024-10-02 PROCEDURE — 72100 X-RAY EXAM L-S SPINE 2/3 VWS: CPT | Performed by: INTERNAL MEDICINE

## 2024-10-02 PROCEDURE — 73523 X-RAY EXAM HIPS BI 5/> VIEWS: CPT | Performed by: INTERNAL MEDICINE

## 2024-10-04 ENCOUNTER — TELEPHONE (OUTPATIENT)
Dept: INTERNAL MEDICINE CLINIC | Facility: CLINIC | Age: 74
End: 2024-10-04

## 2024-10-04 DIAGNOSIS — M54.41 ACUTE BILATERAL LOW BACK PAIN WITH RIGHT-SIDED SCIATICA: ICD-10-CM

## 2024-10-04 DIAGNOSIS — M48.02 CERVICAL SPINAL STENOSIS: Primary | ICD-10-CM

## 2024-10-04 RX ORDER — HYDROCODONE BITARTRATE AND ACETAMINOPHEN 5; 325 MG/1; MG/1
1 TABLET ORAL EVERY 8 HOURS PRN
Qty: 30 TABLET | Refills: 0 | Status: SHIPPED | OUTPATIENT
Start: 2024-10-04

## 2024-10-04 NOTE — TELEPHONE ENCOUNTER
Please notify patient reviewed the x-rays from 10/2  - Mild to moderate bilateral hip osteoarthritis with moderate degenerative disease of the spine, similarly, arthritis changes with some mild scoliosis of the lumbar spine.  Lets continue with    Home stretches, exercises, pain medication as needed.  If still no improvement, neck step would be a trial of physical therapy.  Continue following up with usual back specialist

## 2024-10-04 NOTE — TELEPHONE ENCOUNTER
Spoke to patient, relayed MD message. Verbalized understanding.     To Dr. CAO     Patient asking if there is anything stronger he can take for pain aside form Tylenol    Please advise

## 2024-10-04 NOTE — TELEPHONE ENCOUNTER
PHUC to Dr. CAO    Spoke to patient. Relayed MD message. Patient informed that he had researched the medication and has opted not to take it will continue to manage with tylenol at this time.    Offered to give information for pain specialist- did not want to make appointment at this time.    Instructed to call back with any questions or concerns

## 2024-10-04 NOTE — TELEPHONE ENCOUNTER
Please notify the patient that I sent for Norco to be used up to 3 times a day as needed.  Needs to be careful as this can cause drowsiness, sleepiness, constipation.  Generally minimize use if Tylenol is not effective.    As this has been an ongoing issue, can also see a pain specialist: Dr. Haines    Provider Address Phone   Erika Haines  [address] [facility] [address] [phone]   z

## 2024-10-30 NOTE — PROGRESS NOTES
Injury 1: L TH laceration  - Date: 24  - Days Since: 19     Surgery 1: L TH PG  - Date: 24  - Days Since:    Pt is in the office today for a wound check.  Pt identified by name and .  Orders reviewed.  Denies pain, taking tylenol  po prn,helpful.  Intermittent distal LIF tingling.  Arrived with sling being worn incorrectly,explained and demonstrated proper way to wear sling then removed.  LTH with band-aid CDI,removed,small amount of blood on band-aid.  Incision with sutures CI,edges well approximated without s/s of infection.  Pt given written,verbal and demonstration of wound care for home.  Washed incision at the sink with soap and water then applied polysporin and band-aid.  Pt notified we were canceling 24 appointment with RN and rescheduling with OT 24.  Instructed to call the office with any s/s of infection, questions and/or concerns.  Verbalized understanding.  Dr Gold notified.    Incision with sutures clean and intact,small amount of bloody drainage.  Wash, polysporin,band-aid  24 OT SR   Add Me Kirt Morales (Attending)

## 2024-11-14 ENCOUNTER — OFFICE VISIT (OUTPATIENT)
Dept: INTERNAL MEDICINE CLINIC | Facility: CLINIC | Age: 74
End: 2024-11-14
Payer: MEDICARE

## 2024-11-14 ENCOUNTER — TELEPHONE (OUTPATIENT)
Dept: INTERNAL MEDICINE CLINIC | Facility: CLINIC | Age: 74
End: 2024-11-14

## 2024-11-14 VITALS
TEMPERATURE: 98 F | HEART RATE: 66 BPM | HEIGHT: 70 IN | WEIGHT: 178 LBS | DIASTOLIC BLOOD PRESSURE: 68 MMHG | OXYGEN SATURATION: 98 % | SYSTOLIC BLOOD PRESSURE: 134 MMHG | BODY MASS INDEX: 25.48 KG/M2

## 2024-11-14 DIAGNOSIS — M54.31 RIGHT SIDED SCIATICA: Primary | ICD-10-CM

## 2024-11-14 PROCEDURE — 99213 OFFICE O/P EST LOW 20 MIN: CPT | Performed by: INTERNAL MEDICINE

## 2024-11-14 PROCEDURE — 1125F AMNT PAIN NOTED PAIN PRSNT: CPT | Performed by: INTERNAL MEDICINE

## 2024-11-14 PROCEDURE — 1159F MED LIST DOCD IN RCRD: CPT | Performed by: INTERNAL MEDICINE

## 2024-11-14 RX ORDER — CYCLOBENZAPRINE HCL 10 MG
10 TABLET ORAL 3 TIMES DAILY PRN
Qty: 30 TABLET | Refills: 1 | Status: SHIPPED | OUTPATIENT
Start: 2024-11-14

## 2024-11-14 RX ORDER — METHYLPREDNISOLONE 4 MG/1
TABLET ORAL
Qty: 1 EACH | Refills: 0 | Status: SHIPPED | OUTPATIENT
Start: 2024-11-14

## 2024-11-14 NOTE — TELEPHONE ENCOUNTER
Pt scheduled to see Dr. Ball today at 3pm.    Advised pt call back with any questions/concerns/new or worsening symptoms.   ER precautions reviewed with patient. Pt verbalized understanding.

## 2024-11-14 NOTE — PROGRESS NOTES
Ben Key is a 73 year old male.  Chief Complaint   Patient presents with    Low Back Pain     Onset: 2 weeks  Radiates to Rt hip/buttocks and down leg  Starts off burning pain then sharp.   7/10 (at it's worse).    Otc: 500 MG 4 tablets/day.     Urinary     Reports lessened urine output. + weak stream   No burning or pain with urination.   Rx: Finasteride     HPI:     Gradual onset (2-3 weeks) of \"fiery cramps\" in R low back, thigh, and calf.  Worse in thigh and back of calf.  Worse with walking. Difficulty getting in and out of the car due to pain; has to manually lift his R leg.   Dr. Rodríguez did xray (10/2/24) -- moderate multilevel degenerative disease of the spine.      Pt with hx of R sided sciatica (discussed at appt with Dr. Rodríguez 9/24/24)  Hx of L4-5 microdiscectomy and L4-5 hemilaminectomy 5 years ago.    Current Outpatient Medications   Medication Sig Dispense Refill    memantine 10 MG Oral Tab Take 1 tablet (10 mg total) by mouth daily. 90 tablet 3    DONEPEZIL 10 MG Oral Tab TAKE 1 TABLET BY MOUTH EVERY DAY AT NIGHT 90 tablet 3    midodrine 5 MG Oral Tab Take 1 tablet (5 mg total) by mouth in the morning and 1 tablet (5 mg total) at noon and 1 tablet (5 mg total) in the evening. 270 tablet 3    finasteride 5 MG Oral Tab Take 1 tablet (5 mg total) by mouth daily. 90 tablet 3    Multiple Vitamins-Minerals (CENTRUM SILVER 50+MEN) Oral Tab every other day.      Upadacitinib ER (RINVOQ) 15 MG Oral Tablet 24 Hr Take 1 tablet by mouth daily.      melatonin 5 MG Oral Cap Take 1 capsule (5 mg total) by mouth nightly.      Cyanocobalamin (VITAMIN B12) 500 MCG Oral Tab Take 500 mcg by mouth daily.      acetaminophen 500 MG Oral Tab Take 1 tablet (500 mg total) by mouth in the morning, at noon, and at bedtime.  0    Emollient (CERAVE) External Cream Apply topically as needed.        Past Medical History:    Allergic rhinitis    Hayfever    Arthritis    Work related    Back problem    BPH (benign prostatic  hyperplasia)    Depression    Eczema    Hearing impairment    bilateral    Hypotension    orthostatic    Hypotension    Lumbar disc herniation with radiculopathy    Pyogenic granuloma of skin    Left thumb pyogenic granuloma    Right leg weakness    Visual impairment    glasses      Social History:  Social History     Socioeconomic History    Marital status:    Tobacco Use    Smoking status: Former     Current packs/day: 0.00     Types: Cigarettes     Quit date: 1976     Years since quittin.6     Passive exposure: Past    Smokeless tobacco: Never    Tobacco comments:     Long time smoker, finally quit under doctor care   Vaping Use    Vaping status: Never Used   Substance and Sexual Activity    Alcohol use: Not Currently     Comment: Due to medications I have not drank for several years.    Drug use: Never   Other Topics Concern    Caffeine Concern Yes     Comment: soda 4 cans a day    Exercise No    Seat Belt No    Special Diet No    Stress Concern No    Weight Concern No    Pt has a pacemaker No    Pt has a defibrillator No    Reaction to local anesthetic No     Social Drivers of Health     Financial Resource Strain: Patient Declined (2024)    Received from [facility]    Overall Financial Resource Strain (CARDIA)     Difficulty of Paying Living Expenses: Patient declined   Food Insecurity: Patient Declined (2024)    Received from [facility]    Hunger Vital Sign     Worried About Running Out of Food in the Last Year: Patient declined     Ran Out of Food in the Last Year: Patient declined   Transportation Needs: Patient Declined (2024)    Received from [facility]    PRAPARE - Transportation     Lack of Transportation (Medical): Patient declined     Lack of Transportation (Non-Medical): Patient declined   Housing Stability: Unknown (6/3/2022)    [facility] from [facility]    Housing Stability Vital Sign     Unable to Pay for Housing in the Last Year: No     Unstable Housing in the Last Year: No        REVIEW OF SYSTEMS:   GENERAL HEALTH: feels well otherwise  RESPIRATORY: no SOB  CARDIOVASCULAR: no chest pain/pressure  GI: no nausea, vomiting, diarrhea    Wt Readings from Last 5 Encounters:   11/14/24 178 lb (80.7 kg)   09/24/24 189 lb (85.7 kg)   09/17/24 196 lb (88.9 kg)   07/25/24 193 lb (87.5 kg)   05/23/24 194 lb (88 kg)     Body mass index is 25.54 kg/m².      EXAM:   /68   Pulse 66   Temp 97.9 °F (36.6 °C) (Oral)   Ht 5' 10\" (1.778 m)   Wt 178 lb (80.7 kg)   SpO2 98%   BMI 25.54 kg/m²   GENERAL: well developed, well nourished, in no apparent distress  NEURO: motor 5/5 BLE; +SLR to 45 degrees (R); absent DTR's b/l  EXTREMITIES: no cyanosis, clubbing or edema    ASSESSMENT AND PLAN:     R sciatica  -intermittent sx x years, now with flare  -rec tylenol 1g TID prn; flexeril 10mg TID prn spasms, medrol pennie  -if sx persist, would rec PT eval    The patient indicates understanding of these issues and agrees to the plan.    Marcia Byrnes MD, 11/14/24, 2:59 PM

## 2024-11-14 NOTE — TELEPHONE ENCOUNTER
Patient's wife Ирина is calling for 1 week patient has been experiencing pain in the upper thigh and radiates down to the knee, patient is having difficulty walking    Requesting to be seen there are no openings, can patient be added?    Phone [phone]

## 2024-11-15 ENCOUNTER — APPOINTMENT (OUTPATIENT)
Dept: ULTRASOUND IMAGING | Facility: HOSPITAL | Age: 74
End: 2024-11-15
Attending: STUDENT IN AN ORGANIZED HEALTH CARE EDUCATION/TRAINING PROGRAM
Payer: MEDICARE

## 2024-11-15 ENCOUNTER — HOSPITAL ENCOUNTER (EMERGENCY)
Facility: HOSPITAL | Age: 74
Discharge: HOME | End: 2024-11-15
Attending: STUDENT IN AN ORGANIZED HEALTH CARE EDUCATION/TRAINING PROGRAM
Payer: MEDICARE

## 2024-11-15 VITALS
WEIGHT: 193 LBS | BODY MASS INDEX: 27.63 KG/M2 | TEMPERATURE: 99 F | HEIGHT: 70 IN | OXYGEN SATURATION: 98 % | DIASTOLIC BLOOD PRESSURE: 80 MMHG | RESPIRATION RATE: 15 BRPM | HEART RATE: 75 BPM | SYSTOLIC BLOOD PRESSURE: 144 MMHG

## 2024-11-15 DIAGNOSIS — M54.31 SCIATICA OF RIGHT SIDE: Primary | ICD-10-CM

## 2024-11-15 PROCEDURE — 93971 EXTREMITY STUDY: CPT | Performed by: STUDENT IN AN ORGANIZED HEALTH CARE EDUCATION/TRAINING PROGRAM

## 2024-11-15 PROCEDURE — 99284 EMERGENCY DEPT VISIT MOD MDM: CPT

## 2024-11-15 RX ORDER — HYDROCODONE BITARTRATE AND ACETAMINOPHEN 5; 325 MG/1; MG/1
1-2 TABLET ORAL EVERY 6 HOURS PRN
Qty: 10 TABLET | Refills: 0 | Status: SHIPPED | OUTPATIENT
Start: 2024-11-15 | End: 2024-11-20

## 2024-11-15 RX ORDER — HYDROCODONE BITARTRATE AND ACETAMINOPHEN 5; 325 MG/1; MG/1
1 TABLET ORAL ONCE
Status: COMPLETED | OUTPATIENT
Start: 2024-11-15 | End: 2024-11-15

## 2024-11-15 NOTE — TELEPHONE ENCOUNTER
As FYI to  - called patient , spoke with spouse per HIPAA who sates the pain  is very severe,patient can barely walk - RN advised that patient should be evaluated in ER - agrees F/U 11/18

## 2024-11-15 NOTE — ED INITIAL ASSESSMENT (HPI)
Pt co severe right leg cramps x 1 week. States radiates to lower back.  Saw PMD and prescribed meds but having no relief. Started flexeril and medrol dose pack last night.  Sts pain is worse today

## 2024-11-15 NOTE — ED PROVIDER NOTES
Patient Seen in: [facility] Emergency Department      History     Chief Complaint   Patient presents with    Deep Vein Thrombosis     Stated Complaint: R leg pain    Subjective:   HPI    73-year-old male, presenting for evaluation of right leg pain.  Describes a 2-week history of pain radiating down right hip into right lower extremity below the knee.  Associated with right low back pain.  No bowel or bladder incontinence.  No fevers chills or night sweats.  Was seen by PMDs office on 11/14-diagnosed with sciatica and started on Medrol Dosepak.  He has had no change in his symptoms after a day of steroids.  He is taking Tylenol frequently.  Pain at most 7 out of 10.  Describes as burning.  No numbness weakness or tingling.  Has remote history of L4-L5 microdiscectomy and L4-L5 hemilaminectomy about 5 years ago.  No recent spinal surgery instrumentation.    Objective:     No pertinent past medical history.            No pertinent past surgical history.              No pertinent social history.                Physical Exam     ED Triage Vitals [11/15/24 1357]   /73   Pulse 84   Resp 18   Temp 98.8 °F (37.1 °C)   Temp src Temporal   SpO2 97 %   O2 Device None (Room air)       Current Vitals:   Vital Signs  BP: 144/80  Pulse: 75  Resp: 15  Temp: 98.8 °F (37.1 °C)  Temp src: Temporal    Oxygen Therapy  SpO2: 98 %  O2 Device: None (Room air)        Physical Exam  Constitutional: awake, alert, no sig distress  HENT: mmm, no lesions,  Neck: normal range of motion, no tenderness, supple.  Eyes: PERRL, EOMI, conjunctiva normal, no discharge. Sclera anicteric.  Cardiovascular: rr no murmur  2+ dp/pt pulses bilaterally  Respiratory: Normal breath sounds, no respiratory distress, no wheezing, no chest tenderness.  GI: Bowel sounds normal, Soft, no tenderness, no masses, no pulsatile masses.  : No CVA tenderness.  Skin: Warm, dry, no erythema, no rash.  Musculoskeletal: Intact distal pulses, no edema, no  tenderness, no cyanosis, no clubbing. Good range of motion in all major joints. No tenderness to palpation or major deformities noted. Back- right low back pain +Rt SLR  -normal sensation in saddle region  Neurologic: Alert & oriented x 3, normal motor function, normal sensory function, no focal deficits noted.  Psych: Calm, cooperative, nl affect        ED Course   Labs Reviewed - No data to display                MDM      73M hx as above, presenting with RLE pain. On arrival vss, reassuring  Ddx: Lumbosacral sciatica, DVT, Muscular strain, bakers cyst  -do not suspect cauda equina, cord compression, spinal epidural abscess/hematoma     Ultrasound without evidence of DVT  Return precautions and follow-up instructions were discussed with patient who voiced understanding and agreement the plan.  All questions were answered to patient satisfaction.  Medical Decision Making      Disposition and Plan     Clinical Impression:  1. Sciatica of right side         Disposition:  Discharge  11/15/2024  5:37 pm    Follow-up:  Gabriel Rodríguez MD  [address] [facility] [address]  [phone]    Follow up      [facility] Emergency Department  [address] [facility] [address]  [phone]  Follow up  As needed, If symptoms worsen    We recommend that you schedule follow up care with a primary care provider within the next three months to obtain basic health screening including reassessment of your blood pressure.      Medications Prescribed:  Discharge Medication List as of 11/15/2024  5:39 PM        START taking these medications    Details   HYDROcodone-acetaminophen 5-325 MG Oral Tab Take 1-2 tablets by mouth every 6 (six) hours as needed for Pain., Normal, Disp-10 tablet, R-0                 Supplementary Documentation:

## 2024-11-15 NOTE — TELEPHONE ENCOUNTER
Patient's wife, Ирина (Verified HIPAA) called to state that patient is not any better but rather worse than yesterday when he saw Dr. Fitz Gifford stated that the patient is barely able to walk.     Ирина is looking for doctor's input as to how long medication takes to begin working.    Ирина is wondering if they should be concerned about a blood clot.     Patient advised wife that his pain is  a 12 on a scaled from 1-10.     Ирина's #[phone]

## 2024-11-18 NOTE — TELEPHONE ENCOUNTER
As FYI to  - patient was seen in ER for right leg pain  US - no Deep Vein Thrombosis (DVT) - sciatica of right side - on Norco   To F/U as needed

## 2024-11-27 ENCOUNTER — TELEPHONE (OUTPATIENT)
Dept: INTERNAL MEDICINE CLINIC | Facility: CLINIC | Age: 74
End: 2024-11-27

## 2024-11-29 ENCOUNTER — TELEPHONE (OUTPATIENT)
Dept: INTERNAL MEDICINE CLINIC | Facility: CLINIC | Age: 74
End: 2024-11-29

## 2024-11-29 DIAGNOSIS — M54.16 LUMBAR RADICULOPATHY: Primary | ICD-10-CM

## 2024-11-29 RX ORDER — HYDROCODONE BITARTRATE AND ACETAMINOPHEN 5; 325 MG/1; MG/1
1 TABLET ORAL EVERY 6 HOURS PRN
Qty: 20 TABLET | Refills: 0 | Status: SHIPPED | OUTPATIENT
Start: 2024-11-29

## 2024-11-29 NOTE — TELEPHONE ENCOUNTER
On call;     Imp- sciatica    S/p lumbar microdiscectomy in Feb 2021 by NS Dr Rodrigue Hernandez    MRI lumbar spine ordered    ERx sent for Norco 5 mg po [id number] prn #[id number]    Seeing Dr CAO 12/3/24    Wife called

## 2024-12-01 NOTE — PATIENT INSTRUCTIONS
You are seen in clinic today for post ER follow-up.  Today, I reviewed your recent workup  - This is likely sciatica pains with impingement of the nerves due to arthritis.  Our examination is suggestive of sciatica as this can occur even after back surgery as arthritis can progress over time.    Recommended:  -Lets try some seated stretches and exercises in a more comfortable position.    - Acetaminophen 500-650 mg every 4-6 hours as needed for pain relief. Maximum dosage is 3000 mg in 24 hours.  Be careful as Norco has some Tylenol and should be calculated accordingly.  Lets try to limit Tylenol use to no more than 2-4 times in a day.    -Gabapentin was trialed in the past.  Hopefully we can get good relief for management of nerve irritation.  Take gabapentin 200 mg nightly.  May take 3-6 weeks to take effect.  Though appears that the gabapentin helps control symptoms    -For more severe pain, try to minimize use of Norco for only severe episodes of pain.  Norco has 325 mg of Tylenol in it.  Try to limit to no more than 1-2 times in a night.    -You can alternate the muscle relaxer cyclobenzaprine 5 mg no more than 1-2 times in the day.  Do not mix this with the Norco.      - Agree with MRI lumbar spine ordered by Dr. Jaeger     -As the pain gets better controlled, the hope is that we can perform some degree of physical therapy.    Continue checking blood pressures at home    Return to clinic in 1 month for Medicare annual physical examination.  We did place fasting blood work to be completed prior to next visit

## 2024-12-01 NOTE — PROGRESS NOTES
Chief Complaint:   Chief Complaint   Patient presents with    ER F/U     11/15/24 sciatic right side. Pain 6/10 from right buttocks to above ankle. Discuss weight loss         HPI:     Mr. MAY is a 73 year old male PMHX MGUS, psoriasis, hyperlipidemia, chronic low back pain, erythroderma, cognitive impairment coming in for ER follow-up    Of note, was seen in the ER 11/15/2024 for right leg pain.  Over 2-3 weeks, right leg pain radiating down to below the knees with right low back pain.  Refractory to Medrol Dosepak, imaging negative for DVT.  Discharged on [place] and advised follow-up.    Right buttocks straight across and goes down into the R leg, Today is one of the better days. 8/10 pain. When it is Ok 4/10. Right now is better. Walking around with the walker.     Neck is ok. The muscle cramps still ongoing. When he does get to sleep, the calf seems to spasm when he has to get up at night time. It starts at the ankle and calf and moves up.     The norco pain twice a day when really bad.  Trying to alternate with the muscle relaxer medication.  Has tolerated it okay.  Blood pressures are little better since last visit.  Minimal dizziness, lightheadedness symptoms.    Past Medical History:    Allergic rhinitis    Hayfever    Arthritis    Work related    Back problem    BPH (benign prostatic hyperplasia)    Depression    Eczema    Hearing impairment    bilateral    Hypotension    orthostatic    Hypotension    Lumbar disc herniation with radiculopathy    Pyogenic granuloma of skin    Left thumb pyogenic granuloma    Right leg weakness    Visual impairment    glasses     Past Surgical History:   Procedure Laterality Date    Adj tiss xfer head,fac,hand <10sqcm Left 05/07/2024    Wide excision pyogenic granuloma L thumb,flap reconstruction    Back surgery  2/15/2021    Clean out disc    Cataract  2021    Cataract extraction w/  intraocular lens implant Left 11/16/2021    PC IOL topical IV sedation- Dr. Barriga @ [facility]     Cataract extraction w/  intraocular lens implant Right 10/19/2021    Dr. Barriga    Cataract extraction w/ intraocular lens implant Left 2021    Dr. Barriga     Cholecystectomy      Colonoscopy      Colonoscopy      Colonoscopy N/A 2024    Procedure: COLONOSCOPY;  Surgeon: Khai Doty MD;  Location: [facility] ENDOSCOPY    Other  2021    left eye     Other  10/2021    right eye    Spine surgery procedure unlisted  02/15/2021    LEFT LUMBAR 4 -LUMBAR 5 MICRODISCECTOMY per Dr. Hernandez    Tonsillectomy  As a child     Social History:  Social History     Socioeconomic History    Marital status:    Tobacco Use    Smoking status: Former     Current packs/day: 0.00     Types: Cigarettes     Quit date: 1976     Years since quittin.7     Passive exposure: Past    Smokeless tobacco: Never    Tobacco comments:     Long time smoker, finally quit under doctor care   Vaping Use    Vaping status: Never Used   Substance and Sexual Activity    Alcohol use: Not Currently     Comment: Due to medications I have not drank for several years.    Drug use: Never   Other Topics Concern    Caffeine Concern Yes     Comment: soda 4 cans a day    Exercise No    Seat Belt No    Special Diet No    Stress Concern No    Weight Concern No    Pt has a pacemaker No    Pt has a defibrillator No    Reaction to local anesthetic No     Social Drivers of Health     Financial Resource Strain: Patient Declined (2024)    Received from [facility]    Overall Financial Resource Strain (CARDIA)     Difficulty of Paying Living Expenses: Patient declined   Food Insecurity: Patient Declined (2024)    Received from [facility]    Hunger Vital Sign     Worried About Running Out of Food in the Last Year: Patient declined     Ran Out of Food in the Last Year: Patient declined   Transportation Needs: Patient Declined (2024)    Received from [facility]     PRAPARE - Transportation     Lack of Transportation (Medical): Patient declined     Lack of Transportation (Non-Medical): Patient declined   Housing Stability: Unknown (6/3/2022)    [facility]    Housing Stability Vital Sign     Unable to Pay for Housing in the Last Year: No     Unstable Housing in the Last Year: No     Family History:  Family History   Problem Relation Age of Onset    Other (Dementia) Mother         Late 70s    Glaucoma Mother     Other (partial colectomy) Mother     Dementia Mother         In later years    Depression Mother     Heart Disorder Father         Bypass surgery    Macular degeneration Neg     Diabetes Neg      Allergies:  Allergies   Allergen Reactions    Dupilumab SWELLING and Tightness in Throat    Mildew OTHER (SEE COMMENTS)     Sneezing    Mold OTHER (SEE COMMENTS)     Sneezing     Current Meds:  Current Outpatient Medications   Medication Sig Dispense Refill    gabapentin 100 MG Oral Cap Take 2 capsules (200 mg total) by mouth nightly. 90 capsule 1    HYDROcodone-acetaminophen (NORCO) 5-325 MG Oral Tab Take 1 tablet by mouth every 6 (six) hours as needed for Pain. 20 tablet 0    methylPREDNISolone (MEDROL) 4 MG Oral Tablet Therapy Pack As directed. 1 each 0    cyclobenzaprine 10 MG Oral Tab Take 1 tablet (10 mg total) by mouth 3 (three) times daily as needed for Muscle spasms. 30 tablet 1    memantine 10 MG Oral Tab Take 1 tablet (10 mg total) by mouth daily. 90 tablet 3    DONEPEZIL 10 MG Oral Tab TAKE 1 TABLET BY MOUTH EVERY DAY AT NIGHT 90 tablet 3    midodrine 5 MG Oral Tab Take 1 tablet (5 mg total) by mouth in the morning and 1 tablet (5 mg total) at noon and 1 tablet (5 mg total) in the evening. 270 tablet 3    finasteride 5 MG Oral Tab Take 1 tablet (5 mg total) by mouth daily. 90 tablet 3    Multiple Vitamins-Minerals (CENTRUM SILVER 50+MEN) Oral Tab every other day.      Upadacitinib ER (RINVOQ) 15 MG  Oral Tablet 24 Hr Take 1 tablet by mouth daily.      melatonin 5 MG Oral Cap Take 1 capsule (5 mg total) by mouth nightly.      Cyanocobalamin (VITAMIN B12) 500 MCG Oral Tab Take 500 mcg by mouth daily.      acetaminophen 500 MG Oral Tab Take 2 tablets (1,000 mg total) by mouth in the morning, at noon, and at bedtime.  0    Emollient (CERAVE) External Cream Apply topically as needed.        Counseling given: Not Answered  Tobacco comments: Long time smoker, finally quit under doctor care       REVIEW OF SYSTEMS:   Positive Findings indicated in BOLD    Constitutional: Fever, Chills, Weight Gain, Weight Loss, Night Sweats, Fatigue, Malaise  ENT/Mouth:  Hearing Changes, Ear Pain, Nasal Congestion, Sinus Pain, Hoarseness, Sore throat, Rhinorrhea, Swallowing Difficulty,   Eyes: Eye Pain, Swelling, Redness, Foreign Body, Discharge, Vision Changes  Cardiovascular: Chest Pain, SOB, PND, Dyspnea on Exertion, Orthopnea, Claudication, Edema, Palpitations  Respiratory: Cough, Sputum, Wheezing, Shortness of breath  Gastrointestinal: Nausea, Vomiting, Diarrhea, Constipation, Pain, Heartburn, Dysphagia, Bloody stools, Tarry stools  Genitourinary: Dysmenorrhea, Dysuria, Urinary Frequency, Hematuria, Urinary Incontinence, Urgency,  Flank Pain  Musculoskeletal: Arthralgias, Myalgias, Joint Swelling, Joint Stiffness, Back Pain, Neck Pain, Leg cramps  Integumentary: Skin Lesions, Pruritis, Hair Changes, Jaundice, Nail changes  Neuro: Weakness, Numbness, Paresthesias, Loss of Consciousness, Syncope, Dizziness, Headache, Falls  Psych: Anxiety, Depression, Insomnia, Suicidal Ideation, Homicidal ideation, Memory Changes  Heme/Lymph: Bruising, Bleeding, Lymphadenopathy  Endocrine: Polyuria, Polydipsia, Temperature Intolerance    EXAM:   Vital Signs:  Blood pressure 126/60, pulse 69, temperature 97.6 °F (36.4 °C), height 5' 10\" (1.778 m), weight 187 lb 3.2 oz (84.9 kg), SpO2 99%.     Constitutional: No acute distress. Alert and oriented x  3.  Eyes: EOMI, PERRLA, clear sclera b/l  HENT: NCAT, Moist mucous membranes, Oropharynx without erythema or exudates  Neck: No JVD, no thyromegaly  Cardiovascular: S1, S2, no S3, no S4, Regular rate and rhythm, No murmurs/gallops/rubs.   Respiratory: Clear to auscultation bilaterally.  No wheezes/rales/rhonchi  Gastrointestinal: Soft, nontender, nondistended. Positive bowel sounds x 4. No rebound tenderness. No hepatomegaly, No splenomegaly  Genitourinary: No CVA tenderness bilaterally  Neurologic: No focal neurological deficits, CN II-XII intact, light touch intact  Musculoskeletal: Full range of motion of all extremities, no clubbing/swelling/edema  + Straight leg raise positive on the right  Skin: No lesions, No erythema, no jaundice, Cap Refill < 2s  Psychiatric: Appropriate mood and affect  Heme/Lymph/Immune: No cervical LAD    DATA REVIEWED   Labs:  Recent Results (from the past 8760 hours)   COMP METABOLIC PANEL [E]    Collection Time: 03/01/24  8:01 AM   Result Value Ref Range    Glucose 103 (H) 70 - 99 mg/dL    Sodium 142 136 - 145 mmol/L    Potassium 4.1 3.5 - 5.1 mmol/L    Chloride 111 98 - 112 mmol/L    CO2 25.0 21.0 - 32.0 mmol/L    Anion Gap 6 0 - 18 mmol/L    BUN 24 (H) 9 - 23 mg/dL    Creatinine 1.24 0.70 - 1.30 mg/dL    BUN/CREA Ratio 19.4 10.0 - 20.0    Calcium, Total 9.4 8.7 - 10.4 mg/dL    Calculated Osmolality 298 (H) 275 - 295 mOsm/kg    eGFR-Cr 61 >=60 mL/min/1.73m2    ALT 27 10 - 49 U/L    AST 26 <=34 U/L    Alkaline Phosphatase 80 45 - 117 U/L    Bilirubin, Total 0.5 0.2 - 1.1 mg/dL    Total Protein 7.0 5.7 - 8.2 g/dL    Albumin 4.1 3.2 - 4.8 g/dL    Globulin  2.9 2.8 - 4.4 g/dL    A/G Ratio 1.4 1.0 - 2.0    Patient Fasting for CMP? No      *Note: Due to a large number of results and/or encounters for the requested time period, some results have not been displayed. A complete set of results can be found in Results Review.       Recent Results (from the past 8760 hours)   CBC W/ DIFFERENTIAL     Collection Time: 03/01/24  8:01 AM   Result Value Ref Range    WBC 6.4 4.0 - 11.0 x10(3) uL    RBC 4.07 3.80 - 5.80 x10(6)uL    HGB 13.6 13.0 - 17.5 g/dL    HCT 39.6 39.0 - 53.0 %    MCV 97.3 80.0 - 100.0 fL    MCH 33.4 26.0 - 34.0 pg    MCHC 34.3 31.0 - 37.0 g/dL    RDW-SD 44.8 35.1 - 46.3 fL    RDW 12.5 11.0 - 15.0 %    .0 150.0 - 450.0 10(3)uL    Neutrophil Absolute Prelim 4.70 1.50 - 7.70 x10 (3) uL    Neutrophil Absolute 4.70 1.50 - 7.70 x10(3) uL    Lymphocyte Absolute 0.92 (L) 1.00 - 4.00 x10(3) uL    Monocyte Absolute 0.58 0.10 - 1.00 x10(3) uL    Eosinophil Absolute 0.13 0.00 - 0.70 x10(3) uL    Basophil Absolute 0.03 0.00 - 0.20 x10(3) uL    Immature Granulocyte Absolute 0.02 0.00 - 1.00 x10(3) uL    Neutrophil % 73.7 %    Lymphocyte % 14.4 %    Monocyte % 9.1 %    Eosinophil % 2.0 %    Basophil % 0.5 %    Immature Granulocyte % 0.3 %     *Note: Due to a large number of results and/or encounters for the requested time period, some results have not been displayed. A complete set of results can be found in Results Review.       Imaging:  MRI cervical spine 7/17/2023    Impression   CONCLUSION:        No cord signal abnormality or evidence of abnormal intrathecal enhancement within the limitations of motion artifact.       Multilevel degenerative changes of the cervical spine, which are most advanced at C3-4 with a disc osteophyte complex and superimposed right paracentral disc extrusion resulting in moderate to severe canal stenosis with the extruded disc causing max  effect on the ventral cord, severe right and moderate left axillary recess stenosis, and severe right and moderate left foraminal narrowing .  The remaining levels are described in detail above.          Ultrasound head and neck 4/16/2024  Findings and impression:  There is no solid mass/adenopathy or fluid collection in the soft tissues of the posterior right neck at the site of palpable abnormality       X-rays 10/2/2024  Bilateral  hips    Impression   CONCLUSION:     No acute fracture or dislocation.     Mild to moderate bilateral hip osteoarthritis.     Partially imaged moderate degenerative disease of the spine.     Demineralization.          Lumbar spine 10/2/2024        Impression   CONCLUSION:     Mild levoscoliosis with moderate multilevel degenerative disease of the spine, similar to prior.          Ultrasound Doppler 11/15/2024    Impression   CONCLUSION:  1. Negative right leg venous duplex exam.  2. No deep venous thrombosis.          Surgical pathology 5/7/2024       Left thumb lesion; wide excision:   Skin with subepithelial ulcerated pyogenic granuloma, seen focally at specimen inked margin.  No evidence of atypia or malignancy is identified.     Colonoscopy 7/31/2024  Final Diagnosis:      Transverse colon polyp x1:  Tubular adenoma.               ASSESSMENT AND PLAN:       Low back pain  Right leg pain  - Suspect that this is localized more to the lower lumbar back, possibly the hips but exam seems to be reassuring.  - ER visit reviewed, refractory to Medrol Dosepak.  .  -For more severe pain, try to minimize use of Norco for only severe episodes of pain  - Agree with MRI lumbar spine ordered by Dr. Jaeger over the weekend.  -Should pursue physical therapy once pain better controlled    -Lets try some seated stretches and exercises in a more comfortable position.    - Acetaminophen 500-650 mg every 4-6 hours as needed for pain relief. Maximum dosage is 3000 mg in 24 hours.  Be careful as Norco has some Tylenol and should be calculated accordingly.  Lets try to limit Tylenol use to no more than 2-4 times in a day.    -Gabapentin was trialed in the past.  Hopefully we can get good relief for management of nerve irritation.  Take gabapentin 200 mg nightly.  May take 3-6 weeks to take effect.  Though appears that the gabapentin helps control symptoms    -For more severe pain, try to minimize use of Norco for only severe episodes  of pain.  Norco has 325 mg of Tylenol in it.  Try to limit to no more than 1-2 times in a night.    -You can alternate the muscle relaxer cyclobenzaprine 5 mg no more than 1-2 times in the day.  Do not mix this with the [place].      Pyogenic granuloma  Of note, was seen by Dr. Byrnes for laceration to the left thumb without known injury.  Was seen in urgent care 4/20/2024 for which 1 suture was placed.  However had recurrent episodes of bleeding requiring redressing.  Did see Dr. Gold of hand surgery and underwent wide excision lesion left thumb flap reconstruction 5/7/2024 and tolerated surgery well.  - Seems to be healing well  - Ongoing occupational therapy as recommended by Dr. 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 Martinez.  Possible Florinef/Mestinon incorporation  - We will switch Flomax to finasteride as below    Neck pain  May be due to muscle strain, Lower suspicion cervical radiculopathy from cervical spinal stenosis.  Acute on chronic osteoarthritis pain  - On examination there may be some prominent soft tissue along old R cervical chain biopsy site -recommend checking ultrasound of the right neck area  - Cervical spine MRI 7/17: Multilevel degenerative changes of the cervical spine, most advanced at C3-C4 with disc osteophyte complex and superimposed right paracentral disc extrusion.  This is resulting in moderate to severe canal stenosis with extruded disc causing mass effect on the ventral cord  -Similar comanagement with pain as above  -This remains stable    Muscle cramps  -Continue medical management with the use of muscle relaxers as above.    Excessive Salivation  -Unclear cause, possibly due to medication  side effects  - We will try glycopyrrolate 1 tablet up to 3 times a day as needed.  Should try taking it a time first, monitor for excessive dryness of the mouth.  - There is slight interaction with donepezil, but glycopyrrolate should be okay to use in the short-term..     MGUS  Underwent neurological workup inpatient for hospitalization in 2023.  Noted to have free light chain kappa 3.2, lambda 3.5 with ratio 0.91.  Electrophoresis with IgG lambda monoclonal suspicious for MGUS.  - Established with Dr. Bustamante, last seen 3/8/2024.  Seems to be stable, not contributing to autonomic dysfunction/neuropathy.  Ongoing yearly follow-up.     Psoriasis  - On upadacitinib 15 mg daily -overall well-controlled.  - Will discuss with their dermatologist at [facility]     Hyperlipidemia  - Lipid panel with   - Not currently on statin medication     History of colon polyps  -Last colonoscopy 7/31/2024-tubular adenoma noted for which repeat recommended in 5 years with Dr. Dow     Chronic low back pain with radiculopathy  History of lumbar microdiscectomy  -Recently seen by Dr. Oviedo 12/26/2023 due to worsening back pain.  Treated with Medrol Dosepak, muscle relaxers, ongoing conservative measures.  -Comanagement as above    BPH  - Some relief with the flomax, however will try to improve symptoms with finasteride 5 mg once a day in the event this is contributing to orthostatic hypotension.  - Last PSA at goal     Vitamin D deficiency  - Will check vitamin D level  - Continue with     Erythroderma  - Seems to be well-controlled, under surveillance by dermatology Dr. Denise Lantigua     Eczema  - Seems to be controlled on CeraVe  - On Rinvoq     Dementia  Suspected Alzheimer's dementia.  Following with neurology.  Workup has included MRI brain without significant acute findings temporal atrophy and generalized volume loss.  Also had MRI cervical spine with multilevel degenerative images.  Full secondary workup largely  unremarkable.  Completed neuropsychological test  -Noted vitamin D deficiency  - Last seen by Dr. Martinez 9/17/2024, patient declined PET scan.  Neuropsychological testing indicative of major depression with mild cognitive impairment.  Memantine was increased to 10 mg once a day     Neuropathy  Multifactorial due to vitamin B12 deficiency, MGUS, vitamin D deficiency  - Ongoing vitamin B12 and vitamin D supplementation  -Seems to be a prominent symptom but stable for now         Orders This Visit:  No orders of the defined types were placed in this encounter.      Meds This Visit:  Requested Prescriptions     Signed Prescriptions Disp Refills    gabapentin 100 MG Oral Cap 90 capsule 1     Sig: Take 2 capsules (200 mg total) by mouth nightly.       Imaging & Referrals:  PHYSICAL THERAPY - INTERNAL     Health Maintenance  Up-to-date    Spent 40 minutes obtaining history, evaluating patient, discussing treatment options, diet, exercise, review of available labs and radiology reports, and completing documentation.       Return to clinic in 1 month for Medicare annual physical examination    Gabriel Rodríguez MD, 12/03/24, 9:35 PM

## 2024-12-02 NOTE — TELEPHONE ENCOUNTER
Noted, thanks so much  
Okay to schedule for these appointment slots next week:    Monday 12/2 - 17  OK To double book: 11:30 am  Tuesday 12/3 -  16  OPEN: 4 pm, 4:30 pm  OK to Double book 11 am  Thursday 12/5 - 17  OK to double book: 11:30 am  OK to double book 3:30 pm  Friday 12/6 - 11  OK to double book: 10:30 am   Please let me know what he selects  
Patients wife is calling and states the patient was in the ER for Sciatica pain on 11/15/24.  Patient was instructed to make a follow up appointment with his primary.    Doctors schedule is full.  Please advise  
Spoke to patient appointment scheduled 12/3 at 4 pm  
To DR CAO,    Please advise  
No

## 2024-12-03 ENCOUNTER — OFFICE VISIT (OUTPATIENT)
Dept: INTERNAL MEDICINE CLINIC | Facility: CLINIC | Age: 74
End: 2024-12-03

## 2024-12-03 VITALS
HEIGHT: 70 IN | SYSTOLIC BLOOD PRESSURE: 126 MMHG | WEIGHT: 187.19 LBS | HEART RATE: 69 BPM | TEMPERATURE: 98 F | DIASTOLIC BLOOD PRESSURE: 60 MMHG | BODY MASS INDEX: 26.8 KG/M2 | OXYGEN SATURATION: 99 %

## 2024-12-03 DIAGNOSIS — M54.2 NECK PAIN: ICD-10-CM

## 2024-12-03 DIAGNOSIS — M48.02 CERVICAL SPINAL STENOSIS: ICD-10-CM

## 2024-12-03 DIAGNOSIS — G30.9 MODERATE ALZHEIMER'S DEMENTIA WITHOUT BEHAVIORAL DISTURBANCE, PSYCHOTIC DISTURBANCE, MOOD DISTURBANCE, OR ANXIETY, UNSPECIFIED TIMING OF DEMENTIA ONSET (HCC): ICD-10-CM

## 2024-12-03 DIAGNOSIS — G62.9 NEUROPATHY: ICD-10-CM

## 2024-12-03 DIAGNOSIS — M54.41 ACUTE BILATERAL LOW BACK PAIN WITH RIGHT-SIDED SCIATICA: ICD-10-CM

## 2024-12-03 DIAGNOSIS — L98.0 PYOGENIC GRANULOMA: ICD-10-CM

## 2024-12-03 DIAGNOSIS — N40.0 BENIGN PROSTATIC HYPERPLASIA, UNSPECIFIED WHETHER LOWER URINARY TRACT SYMPTOMS PRESENT: ICD-10-CM

## 2024-12-03 DIAGNOSIS — M54.16 LUMBAR RADICULOPATHY: Primary | ICD-10-CM

## 2024-12-03 DIAGNOSIS — F02.B0 MODERATE ALZHEIMER'S DEMENTIA WITHOUT BEHAVIORAL DISTURBANCE, PSYCHOTIC DISTURBANCE, MOOD DISTURBANCE, OR ANXIETY, UNSPECIFIED TIMING OF DEMENTIA ONSET (HCC): ICD-10-CM

## 2024-12-03 DIAGNOSIS — E78.2 MIXED HYPERLIPIDEMIA: ICD-10-CM

## 2024-12-03 DIAGNOSIS — M54.31 RIGHT SIDED SCIATICA: ICD-10-CM

## 2024-12-03 PROCEDURE — 1159F MED LIST DOCD IN RCRD: CPT | Performed by: INTERNAL MEDICINE

## 2024-12-03 PROCEDURE — 99215 OFFICE O/P EST HI 40 MIN: CPT | Performed by: INTERNAL MEDICINE

## 2024-12-03 PROCEDURE — 1125F AMNT PAIN NOTED PAIN PRSNT: CPT | Performed by: INTERNAL MEDICINE

## 2024-12-03 PROCEDURE — 1160F RVW MEDS BY RX/DR IN RCRD: CPT | Performed by: INTERNAL MEDICINE

## 2024-12-03 RX ORDER — GABAPENTIN 100 MG/1
200 CAPSULE ORAL NIGHTLY
Qty: 90 CAPSULE | Refills: 1 | Status: SHIPPED | OUTPATIENT
Start: 2024-12-03

## 2025-01-01 ENCOUNTER — PATIENT MESSAGE (OUTPATIENT)
Dept: NEUROLOGY | Facility: CLINIC | Age: 75
End: 2025-01-01

## 2025-01-07 ENCOUNTER — HOSPITAL ENCOUNTER (OUTPATIENT)
Dept: MRI IMAGING | Facility: HOSPITAL | Age: 75
Discharge: HOME | End: 2025-01-07
Attending: INTERNAL MEDICINE
Payer: MEDICARE

## 2025-01-07 DIAGNOSIS — M54.16 LUMBAR RADICULOPATHY: ICD-10-CM

## 2025-01-07 PROCEDURE — 72148 MRI LUMBAR SPINE W/O DYE: CPT | Performed by: INTERNAL MEDICINE

## 2025-01-14 ENCOUNTER — TELEPHONE (OUTPATIENT)
Dept: INTERNAL MEDICINE CLINIC | Facility: CLINIC | Age: 75
End: 2025-01-14

## 2025-01-14 DIAGNOSIS — M54.16 LUMBAR RADICULOPATHY: Primary | ICD-10-CM

## 2025-01-14 NOTE — TELEPHONE ENCOUNTER
Please notify patient I reviewed with the MRI lumbar spine 1/7/2025.  It did show our suspicion of degenerative disc disease of the lumbar spine with some areas of moderate to severe narrowing.  This likely the cause of his chronic back pain with sciatica like symptoms.    I would like him to see Dr. Bell of physiatry for further evaluation as we have tried medication management, home stretches and exercises.  May benefit from consideration of injections to the area.    In the meantime, we will reevaluate his symptoms when I see him 1/27    Arian Bell, DO [address] [facility] [address]  [facility] [address] [phone]

## 2025-01-17 ENCOUNTER — OFFICE VISIT (OUTPATIENT)
Dept: OTOLARYNGOLOGY | Facility: CLINIC | Age: 75
End: 2025-01-17
Payer: MEDICARE

## 2025-01-17 ENCOUNTER — PATIENT MESSAGE (OUTPATIENT)
Dept: INTERNAL MEDICINE CLINIC | Facility: CLINIC | Age: 75
End: 2025-01-17

## 2025-01-17 ENCOUNTER — OFFICE VISIT (OUTPATIENT)
Dept: AUDIOLOGY | Facility: CLINIC | Age: 75
End: 2025-01-17

## 2025-01-17 DIAGNOSIS — H90.3 SENSORINEURAL HEARING LOSS (SNHL) OF BOTH EARS: Primary | ICD-10-CM

## 2025-01-17 DIAGNOSIS — H61.23 BILATERAL IMPACTED CERUMEN: ICD-10-CM

## 2025-01-17 PROCEDURE — 1160F RVW MEDS BY RX/DR IN RCRD: CPT | Performed by: OTOLARYNGOLOGY

## 2025-01-17 PROCEDURE — 92557 COMPREHENSIVE HEARING TEST: CPT | Performed by: AUDIOLOGIST

## 2025-01-17 PROCEDURE — 1159F MED LIST DOCD IN RCRD: CPT | Performed by: AUDIOLOGIST

## 2025-01-17 PROCEDURE — 92567 TYMPANOMETRY: CPT | Performed by: AUDIOLOGIST

## 2025-01-17 PROCEDURE — 69210 REMOVE IMPACTED EAR WAX UNI: CPT | Performed by: OTOLARYNGOLOGY

## 2025-01-17 PROCEDURE — 99213 OFFICE O/P EST LOW 20 MIN: CPT | Performed by: OTOLARYNGOLOGY

## 2025-01-17 PROCEDURE — 1159F MED LIST DOCD IN RCRD: CPT | Performed by: OTOLARYNGOLOGY

## 2025-01-17 NOTE — PROGRESS NOTES
AUDIOGRAM     Ben Key was referred for testing by Ben Kimball.   12/15/1950  [id number]      Otoscopic Inspection:  {EM AUD OTOSCOPIC SIDE:80624548}    Audiometric Test Results:  The patient was tested using {EM AUD AUDIO TEST:54910769}.  The audiometric thresholds indicate a {DEGREE:85046260} {TYPE:24085210} in the {RIGHT/LEFT/BOTH EAR:80551217}.  These results are {RESULTS:70997571} since the last test done on ***.    Speech Test Results:  Speech Reception Thresholds, SRTs, the lowest level at which speech is first recongnized, were obtained at {EM AUD AUDIO SPEECH:32898902}  These scores are in{good/poor:98238950} agreement with the pure tone thresholds.  Word recognition were obtained at {EM AUD AUDIO RECOGNITION:62172051}.  {Word Recognition Score Indications:95667384}     Immittance Test Results:  Testing could not be performed today {EM AUD IMMITANCE PERFORMED:60647670}  Classification: {RIGHT/LEFT/BILAT:55186079}  Ear canal volume: Right *** mL, Left *** mL  Peak middle ear pressure: Right *** daP, Left: *** daP  Static compliance: Right *** mL, Left *** mL  {EM AUD AUDIO  IMMITTANCE:85128271}    Acoustic Reflex Test Results:  {EM AUD AUDIO REFLEX:93888557}     Acoustic Reflex Decay Testing Results:  Acoustic reflex decay was negative at {EM AUD AUDIO Hz:53100836} for the {RIGHT/LEFT/BOTH EAR:03220584}.  Acoustic reflex decay was positive at {EM AUD AUDIO Hz:30079509} for the {RIGHT/LEFT/BOTH EAR:07177172}.    Recommendations:  {EM AUD AUDIO RECOMMEND:27347004}    1/17/2025  CRISTEL Pederson

## 2025-01-18 NOTE — PROGRESS NOTES
Ben Key is a 74 year old male.    Chief Complaint   Patient presents with    Follow - Up     Ear cleaning        HISTORY OF PRESENT ILLNESS    He presents with a history of decreased hearing.  Feels that his ears are plugged all the time to history of having his ears cleaned in the past.  Last had his ears cleaned in July of last year.    Social History     Socioeconomic History    Marital status:    Tobacco Use    Smoking status: Former     Current packs/day: 0.00     Types: Cigarettes     Quit date: 1976     Years since quittin.8     Passive exposure: Past    Smokeless tobacco: Never    Tobacco comments:     Long time smoker, finally quit under doctor care   Vaping Use    Vaping status: Never Used   Substance and Sexual Activity    Alcohol use: Not Currently     Comment: Due to medications I have not drank for several years.    Drug use: Never   Other Topics Concern    Caffeine Concern Yes     Comment: soda 4 cans a day    Exercise No    Seat Belt No    Special Diet No    Stress Concern No    Weight Concern No    Pt has a pacemaker No    Pt has a defibrillator No    Reaction to local anesthetic No       Family History   Problem Relation Age of Onset    Other (Dementia) Mother         Late 70s    Glaucoma Mother     Other (partial colectomy) Mother     Dementia Mother         In later years    Depression Mother     Heart Disorder Father         Bypass surgery    Macular degeneration Neg     Diabetes Neg        Past Medical History:    Allergic rhinitis    Hayfever    Arthritis    Work related    Back problem    BPH (benign prostatic hyperplasia)    Depression    Eczema    Hearing impairment    bilateral    Hypotension    orthostatic    Hypotension    Lumbar disc herniation with radiculopathy    Pyogenic granuloma of skin    Left thumb pyogenic granuloma    Right leg weakness    Visual impairment    glasses       Past Surgical History:   Procedure Laterality Date    Adj tiss xfer  head,fac,hand <10sqcm Left 05/07/2024    Wide excision pyogenic granuloma L thumb,flap reconstruction    Back surgery  2/15/2021    Clean out disc    Cataract  2021    Cataract extraction w/  intraocular lens implant Left 11/16/2021    PC IOL topical IV sedation- Dr. Barriga @ [facility]    Cataract extraction w/  intraocular lens implant Right 10/19/2021    Dr. Barriga    Cataract extraction w/ intraocular lens implant Left 11/2021    Dr. Barriga     Cholecystectomy      Colonoscopy      Colonoscopy  April, 2021    Colonoscopy N/A 7/31/2024    Procedure: COLONOSCOPY;  Surgeon: Khai Doty MD;  Location: [facility] ENDOSCOPY    Other  11/2021    left eye     Other  10/2021    right eye    Spine surgery procedure unlisted  02/15/2021    LEFT LUMBAR 4 -LUMBAR 5 MICRODISCECTOMY per Dr. Hernandez    Tonsillectomy  As a child         REVIEW OF SYSTEMS    System Neg/Pos Details   Constitutional Negative Fatigue, fever and weight loss.   ENMT Negative Drooling.   Eyes Negative Blurred vision and vision changes.   Respiratory Negative Dyspnea and wheezing.   Cardio Negative Chest pain, irregular heartbeat/palpitations and syncope.   GI Negative Abdominal pain and diarrhea.   Endocrine Negative Cold intolerance and heat intolerance.   Neuro Negative Tremors.   Psych Negative Anxiety and depression.   Integumentary Negative Frequent skin infections, pigment change and rash.   Hema/Lymph Negative Easy bleeding and easy bruising.           PHYSICAL EXAM    There were no vitals taken for this visit.       Constitutional Normal Overall appearance - Normal.   Psychiatric Normal Orientation - Oriented to time, place, person & situation. Appropriate mood and affect.   Neck Exam Normal Inspection - Normal. Palpation - Normal. Parotid gland - Normal. Thyroid gland - Normal.   Eyes Normal Conjunctiva - Right: Normal, Left: Normal. Pupil - Right: Normal, Left: Normal. Fundus - Right: Normal, Left: Normal.   Neurological Normal Memory - Normal. Cranial  nerves - Cranial nerves II through XII grossly intact.   Head/Face Normal Facial features - Normal. Eyebrows - Normal. Skull - Normal.        Nasopharynx Normal External nose - Normal. Lips/teeth/gums - Normal. Tonsils - Normal. Oropharynx - Normal.   Ears Normal Inspection - Right: Normal, Left: Normal. Canal - Right: Normal, Left: Normal. TM - Right: Normal, Left: Normal.   Skin Normal Inspection - Normal.        Lymph Detail Normal Submental. Submandibular. Anterior cervical. Posterior cervical. Supraclavicular.        Nose/Mouth/Throat Normal External nose - Normal. Lips/teeth/gums - Normal. Tonsils - Normal. Oropharynx - Normal.   Nose/Mouth/Throat Normal Nares - Right: Normal Left: Normal. Septum -Normal  Turbinates - Right: Normal, Left: Normal.       Current Outpatient Medications:     gabapentin 100 MG Oral Cap, Take 2 capsules (200 mg total) by mouth nightly., Disp: 90 capsule, Rfl: 1    HYDROcodone-acetaminophen (NORCO) 5-325 MG Oral Tab, Take 1 tablet by mouth every 6 (six) hours as needed for Pain., Disp: 20 tablet, Rfl: 0    methylPREDNISolone (MEDROL) 4 MG Oral Tablet Therapy Pack, As directed., Disp: 1 each, Rfl: 0    cyclobenzaprine 10 MG Oral Tab, Take 1 tablet (10 mg total) by mouth 3 (three) times daily as needed for Muscle spasms., Disp: 30 tablet, Rfl: 1    memantine 10 MG Oral Tab, Take 1 tablet (10 mg total) by mouth daily., Disp: 90 tablet, Rfl: 3    DONEPEZIL 10 MG Oral Tab, TAKE 1 TABLET BY MOUTH EVERY DAY AT NIGHT, Disp: 90 tablet, Rfl: 3    midodrine 5 MG Oral Tab, Take 1 tablet (5 mg total) by mouth in the morning and 1 tablet (5 mg total) at noon and 1 tablet (5 mg total) in the evening., Disp: 270 tablet, Rfl: 3    finasteride 5 MG Oral Tab, Take 1 tablet (5 mg total) by mouth daily., Disp: 90 tablet, Rfl: 3    Multiple Vitamins-Minerals (CENTRUM SILVER 50+MEN) Oral Tab, every other day., Disp: , Rfl:     Upadacitinib ER (RINVOQ) 15 MG Oral Tablet 24 Hr, Take 1 tablet by mouth daily.,  Disp: , Rfl:     melatonin 5 MG Oral Cap, Take 1 capsule (5 mg total) by mouth nightly., Disp: , Rfl:     Cyanocobalamin (VITAMIN B12) 500 MCG Oral Tab, Take 500 mcg by mouth daily., Disp: , Rfl:     acetaminophen 500 MG Oral Tab, Take 2 tablets (1,000 mg total) by mouth in the morning, at noon, and at bedtime., Disp: , Rfl: 0    Emollient (CERAVE) External Cream, Apply topically as needed., Disp: , Rfl:   ASSESSMENT AND PLAN    1. Sensorineural hearing loss (SNHL) of both ears  Audiogram does demonstrate mild downsloping to severe sensory hearing loss on the right with a more normal downsloping to severe sensory hearing loss on the left.  Slight asymmetry with the right being worse than the left at the low and mid frequencies.  I did recommend repeating a hearing test in the year to watch for stability of his hearing and if worsening right-sided hearing loss would highly recommend an MRI to rule out an intracranial or retrocochlear process.  I also did recommend that he contact his insurance to see what type of hearing aid benefits are available to him.  Return to see me in 1 month for repeat audiogram.        This note was prepared using [employer] voice recognition dictation software. As a result errors may occur. When identified these errors have been corrected. While every attempt is made to correct errors during dictation discrepancies may still exist    Ben Kimball MD    1/18/2025    12:19 AM

## 2025-01-26 NOTE — H&P
HPI:   Ben Key is a 74 year old male who presents for a Medicare Subsequent Annual Wellness visit (Pt already had Initial Annual Wellness).    Cramps in the legs, worse in the back legs. Fiery cramps in the top of the legs.    Hands with numbness with pins and needles, significant stiffness. Numbness in the feet.     R leg and knee with weakness.    Still with dizziness that affects. Getting up all of a sudden can cause it. Walk with a dragged foot. Seems to be the R leg. Tylenol for pain relief. Gabapentin at night    Hernia can be improved with the tyleonol. Having fair BMs. 1 month ago it would very stiff to get out. It is getting better.    After stopping the finasteride the urination frequency has decreased. Better control of the urine. He has better control. Stream is better. 1-2 times a night but this is improving.    Snoring with 8 hours of sleep.     I reviewed and updated the PMHx, FamHx, medications, allergies, and SocHx as below with the patient    SocHX  - Home: Feels safe at home; wife  - Work: Feels safe at work; retired [employer] x [phone]   - Hobbies: help kids build/design things. Repairing condo, watching sports.  - Nutrition: breakfast - eggs, chicken sausage/walsh/toast with jelly. English muffin. Lunch - sandwiches, turkey and weight bread. Dinner - not as much beef. Fish pork. Water 8 oz. 16 oz - regular.  - Physical Activity: not very active, he almost had a small fall. He leans when he walks.       No topic due editable text        Fall Risk Assessment:   He has been screened for Falls and is low risk.    Cognitive Assessment:   Abnormal  What day of the week is this?: Correct  What month is it?: Correct  What year is it?: Incorrect  Recall \"Ball\": Correct  Recall \"Flag\": Incorrect  Recall \"Tree\": Incorrect      Functional Ability/Status:   Ben Key has some abnormal functions as listed below:  He has Dressing and/or Bathing issues based on screening of  functional status.  Difficulty dressing or bathing?: Yes  Bathing or Showering: Able without help  Dressing: Need some help  He has Driving difficulties based on screening of functional status. He has Meal Preparation difficulties based on screening of functional status.He has difficulties Managing Money/Bills based on screening of functional status.He has difficulties Shopping for Groceries based on screening of functional status. He has difficulties Taking Meds as Rx'd based on screening of functional status. He has Hearing problems based on screening of functional status.He has Walking problems based on screening of functional status. He has problems with Daily Activities based on screening of functional status. He has problems with Memory based on screening of functional status.       Depression Screening (PHQ-2/PHQ-9): Over the LAST 2 WEEKS   Little interest or pleasure in doing things: Not at all  Feeling down, depressed, or hopeless: More than half the days  PHQ-2 SCORE: 2 1. Little interest or pleasure in doing things: Not at all  2. Feeling down, depressed, or hopeless: More than half the days  3.    4.    5.    6.    7.    8.    9.             Advanced Directive:  He does NOT have a Living Will. [Do you have a living will?: Yes]  He has a Power of  for Health Care on file in [facility].    Tobacco:  He smoked tobacco in the past but quit greater than 12 months ago.  Social History     Tobacco Use   Smoking Status Former    Current packs/day: 0.00    Types: Cigarettes    Quit date: 1976    Years since quittin.8    Passive exposure: Past   Smokeless Tobacco Never   Tobacco Comments    Long time smoker, finally quit under doctor care          CAGE Alcohol Screen:   CAGE screening score of 0 on 2025, showing low risk of alcohol abuse.       Patient Care Team: Patient Care Team:  Gabriel Rodríguez MD as PCP - General (Internal Medicine)  Cornelius Kim PA as Consulting Physician (Physician  Assistant)  Rodrigue Hernandez MD as Consulting Physician (NEUROSURGERY)  Jenelle Dougherty PA-C as Consulting Physician (Physician Assistant)  Vasile Antony MD (SURGERY, PLASTIC)    Patient Active Problem List   Diagnosis    Smoker    Psoriasis    Dyslipidemia    Ganglion cyst of wrist, left    Chronic left lumbar radiculopathy    HNP (herniated nucleus pulposus), lumbar    Poor posture    Sleep disturbance    S/P lumbar microdiscectomy    Age-related nuclear cataract of both eyes    Floaters in visual field, bilateral    Visual impairment    Right leg weakness    Lumbar disc herniation with radiculopathy    Eczema    BPH (benign prostatic hyperplasia)    Pseudophakia of both eyes    Ataxia    Observation for suspected condition     Wt Readings from Last 3 Encounters:   01/27/25 186 lb (84.4 kg)   12/03/24 187 lb 3.2 oz (84.9 kg)   11/15/24 193 lb (87.5 kg)      Last Cholesterol Labs:   Lab Results   Component Value Date    CHOLEST 176 01/26/2024    HDL 43 01/26/2024     (H) 01/26/2024    TRIG 106 01/26/2024          Last Chemistry Labs:   Lab Results   Component Value Date    AST 26 03/01/2024    ALT 27 03/01/2024    CA 9.4 03/01/2024    ALB 4.1 03/01/2024    ALB 4.01 03/01/2024    TSH 3.111 01/26/2024    CREATSERUM 1.24 03/01/2024     (H) 03/01/2024        CBC  (most recent labs)   Lab Results   Component Value Date    WBC 6.4 03/01/2024    HGB 13.6 03/01/2024    .0 03/01/2024        ALLERGIES:   He is allergic to dupilumab, mildew, and mold.    CURRENT MEDICATIONS:   Outpatient Medications Marked as Taking for the 1/27/25 encounter (Office Visit) with Gabriel Rodríguez MD   Medication Sig    Scopolamine 1.5mg TD patch 1mg/3days Place 1 patch onto the skin every third day.    gabapentin 100 MG Oral Cap Take 2 capsules (200 mg total) by mouth nightly. (Patient taking differently: Take 1 capsule (100 mg total) by mouth nightly.)    memantine 10 MG Oral Tab Take 1 tablet (10 mg total) by  mouth daily.    DONEPEZIL 10 MG Oral Tab TAKE 1 TABLET BY MOUTH EVERY DAY AT NIGHT    midodrine 5 MG Oral Tab Take 1 tablet (5 mg total) by mouth in the morning and 1 tablet (5 mg total) at noon and 1 tablet (5 mg total) in the evening.    Upadacitinib ER (RINVOQ) 15 MG Oral Tablet 24 Hr Take 1 tablet by mouth daily.    melatonin 5 MG Oral Cap Take 1 capsule (5 mg total) by mouth nightly.    Cyanocobalamin (VITAMIN B12) 500 MCG Oral Tab Take 500 mcg by mouth daily.    acetaminophen 500 MG Oral Tab Take 2 tablets (1,000 mg total) by mouth in the morning, at noon, and at bedtime.      MEDICAL INFORMATION:   He  has a past medical history of Allergic rhinitis (Since childhood), Arthritis (12 years ago), Back problem, BPH (benign prostatic hyperplasia), Depression (When eczema started), Eczema, Hearing impairment, Hypotension, Hypotension, Lumbar disc herniation with radiculopathy, Pyogenic granuloma of skin (05/06/2024), Right leg weakness, and Visual impairment.    He  has a past surgical history that includes cholecystectomy; colonoscopy; spine surgery procedure unlisted (02/15/2021); other (11/2021); other (10/2021); Cataract extraction w/ intraocular lens implant (Left, 11/2021); Cataract extraction w/  intraocular lens implant (Left, 11/16/2021); Cataract extraction w/  intraocular lens implant (Right, 10/19/2021); back surgery (2/15/2021); cataract (2021); colonoscopy (April, 2021); tonsillectomy (As a child); adj tiss xfer head,fac,hand <10sqcm (Left, 05/07/2024); and colonoscopy (N/A, 7/31/2024).    His family history includes Dementia in his mother and mother; Depression in his mother; Glaucoma in his mother; Heart Disorder in his father; partial colectomy in his mother.   SOCIAL HISTORY:   He  reports that he quit smoking about 48 years ago. His smoking use included cigarettes. He has been exposed to tobacco smoke. He has never used smokeless tobacco. He reports that he does not currently use alcohol. He  reports that he does not use drugs.     REVIEW OF SYSTEMS:   GENERAL: feels well otherwise  SKIN: denies any unusual skin lesions  EYES: denies blurred vision or double vision  HEENT: denies nasal congestion, sinus pain or ST  LUNGS: denies shortness of breath with exertion  CARDIOVASCULAR: denies chest pain on exertion  GI: denies abdominal pain, denies heartburn  : 1-2 per night nocturia, no complaint of urinary incontinence  MUSCULOSKELETAL: denies back pain  NEURO: denies headaches; memory changes, dizziness  PSYCHE: denies depression or anxiety  HEMATOLOGIC: denies hx of anemia  ENDOCRINE: denies thyroid history  ALL/ASTHMA: denies hx of allergy or asthma    EXAM:   /78   Pulse 62   Temp 97.9 °F (36.6 °C) (Oral)   Ht 5' 10\" (1.778 m)   Wt 186 lb (84.4 kg)   SpO2 97%   BMI 26.69 kg/m²   Estimated body mass index is 26.69 kg/m² as calculated from the following:    Height as of this encounter: 5' 10\" (1.778 m).    Weight as of this encounter: 186 lb (84.4 kg).    Medicare Hearing Assessment  (Required for AWV/SWV)    Hearing Screening    Time taken: 1/27/2025  2:07 PM  Entry User: Cathy Messina RN  Screening Method: Whisper Test  Whisper Test Result: Pass                  Visual Acuity                           General Appearance:  Alert, cooperative, no distress, appears stated age   Head:  Normocephalic, without obvious abnormality, atraumatic   Eyes:  PERRL, conjunctiva/corneas clear, EOM's intact, both eyes   Ears:  Normal TM's and external ear canals, both ears   Nose: Nares normal, septum midline, mucosa normal, no drainage or sinus tenderness   Throat: Lips, mucosa, and tongue normal; teeth and gums normal   Neck: Supple, symmetrical, trachea midline, no adenopathy, thyroid: not enlarged, symmetric, no tenderness/mass/nodules, no carotid bruit or JVD   Back:   Symmetric, no curvature, ROM normal, no CVA tenderness   Lungs:   Clear to auscultation bilaterally, respirations unlabored   Chest  Wall:  No tenderness or deformity   Heart:  Regular rate and rhythm, S1, S2 normal, no murmur, rub or gallop   Abdomen:   Soft, non-tender, bowel sounds active all four quadrants,  no masses, no organomegaly   Genitalia: Normal male   Rectal: Normal tone, +BPH, no masses or tenderness   Extremities: Extremities normal, atraumatic, no cyanosis or edema   Pulses: 2+ and symmetric   Skin: Skin color, texture, turgor normal, no rashes or lesions   Lymph nodes: Cervical, supraclavicular, and axillary nodes normal   Neurologic: Normal, CN II through XII intact, 5 out of 5 muscle strength throughout, 2+ DTRs patellar tendons, normal gait        Vaccination History     Immunization History   Administered Date(s) Administered    Covid-19 Vaccine Pfizer 30 mcg/0.3 ml 03/05/2021, 03/23/2021, 09/27/2021    Covid-19 Vaccine Pfizer Bivalent 30mcg/0.3mL 09/13/2022    Covid-19 Vaccine Pfizer Jose Juan-Sucrose 30 mcg/0.3 ml 05/13/2022    FLU VAC High Dose 65 YRS & Older PRSV Free (38377) 09/01/2017, 09/14/2019, 09/15/2020, 09/17/2020, 10/01/2022    FLUAD High Dose 65 yr and older (95382) 09/30/2024    FLUZONE 6 months and older PFS 0.5 ml (43824) 10/13/2014    High Dose Fluzone Influenza Vaccine, 65yr+ PF 0.5mL (32037) 10/12/2015, 10/09/2016, 09/01/2017, 09/26/2017, 09/29/2018, 09/14/2019, 09/21/2021, 09/20/2023    Influenza 10/20/2013, 10/20/2013, 09/14/2019, 09/08/2020, 09/21/2021    Moderna Covid-19 Vaccine 50mcg/0.5ml 12yrs+ 09/20/2023, 09/30/2024    Pneumococcal (Prevnar 13) 10/25/2019    Pneumovax 23 10/05/2020    RSV, recombinant, RSVpreF, adjuvanted (Arexvy) 10/09/2023    TDAP 10/05/2020    Zoster Vaccine Recombinant Adjuvanted (Shingrix) 04/27/2023, 06/28/2023        ASSESSMENT AND OTHER RELEVANT CHRONIC CONDITIONS:   Ben Key is a 74 year old male who presents for a Medicare Assessment.     MRI cervical spine 7/17/2023    Impression   CONCLUSION:        No cord signal abnormality or evidence of abnormal intrathecal  enhancement within the limitations of motion artifact.       Multilevel degenerative changes of the cervical spine, which are most advanced at C3-4 with a disc osteophyte complex and superimposed right paracentral disc extrusion resulting in moderate to severe canal stenosis with the extruded disc causing max  effect on the ventral cord, severe right and moderate left axillary recess stenosis, and severe right and moderate left foraminal narrowing .  The remaining levels are described in detail above.           Ultrasound head and neck 4/16/2024  Findings and impression:  There is no solid mass/adenopathy or fluid collection in the soft tissues of the posterior right neck at the site of palpable abnormality        X-rays 10/2/2024  Bilateral hips    Impression   CONCLUSION:     No acute fracture or dislocation.     Mild to moderate bilateral hip osteoarthritis.     Partially imaged moderate degenerative disease of the spine.     Demineralization.           Lumbar spine 10/2/2024        Impression   CONCLUSION:     Mild levoscoliosis with moderate multilevel degenerative disease of the spine, similar to prior.            Ultrasound Doppler 11/15/2024    Impression   CONCLUSION:  1. Negative right leg venous duplex exam.  2. No deep venous thrombosis.         MRI lumbar spine 1/7/2025  Impression   CONCLUSION:  1. Multilevel degenerative disc disease throughout the lumbar spine, most notably at L4-L5, where extruded disc material contributes to moderate-severe spinal canal stenosis, right worse than left subarticular zone impingement of the descending nerve  roots, and severe right greater than left foraminal impingement.     2. Mild spinal canal narrowing is present at L3-L4 with associated right greater than left subarticular zone and foraminal narrowing.     3. Additional degenerative changes as above described.     4. No acute osseous injury of the lumbar spine.     5. Nonspecific red marrow reconversion may be  indicative of hematopoietic disturbance or could be related to smoking.     6. Partially delineated distal colonic diverticulosis without MR evidence acute complication in the imaged volume.     7. Lesser incidental findings as above.        Surgical pathology 5/7/2024       Left thumb lesion; wide excision:   Skin with subepithelial ulcerated pyogenic granuloma, seen focally at specimen inked margin.  No evidence of atypia or malignancy is identified.      Colonoscopy 7/31/2024  Final Diagnosis:      Transverse colon polyp x1:  Tubular adenoma.                PLAN SUMMARY:   Diagnoses and all orders for this visit:    Annual physical exam    Lumbar radiculopathy    Right sided sciatica    Cervical spinal stenosis    Moderate Alzheimer's dementia without behavioral disturbance, psychotic disturbance, mood disturbance, or anxiety, unspecified timing of dementia onset (HCC)  -     Vitamin B12 [E]; Future    Neuropathy  -     Vitamin B12 [E]; Future    Benign prostatic hyperplasia, unspecified whether lower urinary tract symptoms present    Mixed hyperlipidemia  -     Lipid Panel; Future    Psoriasis  -     CBC With Differential With Platelet; Future  -     Comp Metabolic Panel (14); Future    History of colon polyps    Orthostatic hypotension  -     Magnesium [E]; Future  -     Comp Metabolic Panel (14); Future    Encounter for annual health examination    Screening for thyroid disorder  -     TSH W Reflex To Free T4; Future    Muscle cramps  -     Magnesium [E]; Future  -     Vitamin B12 [E]; Future    At risk for foot problem  -     Podiatry Referral - In Network    Hypersalivation  -     Scopolamine 1.5mg TD patch 1mg/3days; Place 1 patch onto the skin every third day.         Low back pain  Right leg pain  Chronic ongoing issue, related to lumbar spinal stenosis  -MRI lumbar spine 1/7/2025: Multilevel degenerative disc disease throughout the lumbar spine L4-L5 with extruded disc material resulting in moderate to  severe spinal canal stenosis, right worse than left subarticular zone impingement at the descending nerve roots, severe right greater than left foraminal impingement.  There is mild spinal canal stenosis at L3-L4 with right greater than left subarticular zone foraminal narrowing.  -Should pursue physical therapy once pain better controlled     -Lets try some seated stretches and exercises in a more comfortable position.     - Acetaminophen 500-650 mg every 4-6 hours as needed for pain relief. Maximum dosage is 3000 mg in 24 hours.  Be careful as Norco has some Tylenol and should be calculated accordingly.  Lets try to limit Tylenol use to no more than 2-4 times in a day.     -Gabapentin was trialed in the past.  Hopefully we can get good relief for management of nerve irritation.  Take gabapentin 200 mg nightly.  May take 3-6 weeks to take effect.  Though appears that the gabapentin helps control symptoms     -For more severe pain, try to minimize use of Norco for only severe episodes of pain.  Norco has 325 mg of Tylenol in it.  Try to limit to no more than 1-2 times in a night.     -You can alternate the muscle relaxer cyclobenzaprine 5 mg no more than 1-2 times in the day.  Do not mix this with the Cana.  - Referral for Dr. Bell for possible epidural steroid injections.        Pyogenic granuloma  Of note, was seen by Dr. Byrnes for laceration to the left thumb without known injury.  Was seen in urgent care 4/20/2024 for which 1 suture was placed.  However had recurrent episodes of bleeding requiring redressing.  Did see Dr. Gold of hand surgery and underwent wide excision lesion left thumb flap reconstruction 5/7/2024 and tolerated surgery well.  - Seems to be healing well  - Ongoing occupational therapy as recommended by Dr. 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 Martinez.  Possible Florinef/Mestinon incorporation  - We will switch Flomax to finasteride as below     Neck pain  May be due to muscle strain, Lower suspicion cervical radiculopathy from cervical spinal stenosis.  Acute on chronic osteoarthritis pain  - On examination there may be some prominent soft tissue along old R cervical chain biopsy site -recommend checking ultrasound of the right neck area  - Cervical spine MRI 7/17: Multilevel degenerative changes of the cervical spine, most advanced at C3-C4 with disc osteophyte complex and superimposed right paracentral disc extrusion.  This is resulting in moderate to severe canal stenosis with extruded disc causing mass effect on the ventral cord  -Similar comanagement with pain as above  -This remains stable     Muscle cramps  -Continue medical management with the use of muscle relaxers as above.  -Will check up on electrolytes, vitamin B12     Excessive Salivation  -Unclear cause, possibly due to medication side effects  - Refractory to glycopyrrolate  - There is slight interaction with donepezil, but we can try scopolamine patch 1 patch every 72 hours as needed.  - If still no better, may need to evaluate with dermatology     MGUS  Underwent neurological workup inpatient for hospitalization in 2023.  Noted to have free light chain kappa 3.2, lambda 3.5 with ratio 0.91.  Electrophoresis with IgG lambda monoclonal suspicious for MGUS.  - Established with Dr. Bustamante, last seen 3/8/2024.  Seems to be stable, not contributing to autonomic dysfunction/neuropathy.  Ongoing yearly follow-up.     Psoriasis  - On upadacitinib 15 mg daily -overall well-controlled.  - Will discuss with their dermatologist at [place]     Hyperlipidemia  - Lipid panel with   - Not currently on statin medication     History of colon  polyps  -Last colonoscopy 7/31/2024-tubular adenoma noted for which repeat recommended in 5 years with Dr. Dow     BPH  - Some relief with the flomax, no longer taking finasteride  - Last PSA at goal     Vitamin D deficiency  - Will check vitamin D level  - Continue with     Erythroderma  - Seems to be well-controlled, under surveillance by dermatology Dr. Denise Lantigua     Eczema  - Seems to be controlled on CeraVe  - On Rinvoq     Dementia  Suspected Alzheimer's dementia.  Following with neurology.  Workup has included MRI brain without significant acute findings temporal atrophy and generalized volume loss.  Also had MRI cervical spine with multilevel degenerative images.  Full secondary workup largely unremarkable.  Completed neuropsychological test  -Noted vitamin D deficiency  - Last seen by Dr. Martinez 9/17/2024, patient declined PET scan.  Neuropsychological testing indicative of major depression with mild cognitive impairment.  Memantine was increased to 10 mg once a day     Neuropathy  Multifactorial due to vitamin B12 deficiency, MGUS, vitamin D deficiency  - Ongoing vitamin B12 and vitamin D supplementation, will repeat levels  -Seems to be a prominent symptom but stable for now     Sensorineural hearing loss  - Seen by Dr. Kimball 1/17/2025.    HTN Screen: BP at goal  DM Screen: Check fasting sugar  HLD Screen: Check fasting lipid panel  HCV Screen: Considered low risk  HIV Screen: considered low risk  G/C/Syphilis: Considered low risk    Colon Cancer Screening (45-70): Last in 2024, 9 polypsnoted  Prostate Cancer Screening: (50-70): Check PSA  Lung Cancer Screening (55-79 with 30 p/year and active < 15 years): Reassess at next visit  AAA Screening (65-75 Hx of smoking): Reassess at next visit    Influenza: Annually   Td/Tdap: Last Tdap 2020  Zoster (50+): Up-to-date  HPV (19-26): UTD  Pneumococcal: UTD    Immunization History   Administered Date(s) Administered    Covid-19 Vaccine Pfizer 30 mcg/0.3 ml  03/05/2021, 03/23/2021, 09/27/2021    Covid-19 Vaccine Pfizer Bivalent 30mcg/0.3mL 09/13/2022    Covid-19 Vaccine Pfizer Jose Juan-Sucrose 30 mcg/0.3 ml 05/13/2022    FLU VAC High Dose 65 YRS & Older PRSV Free (79254) 09/01/2017, 09/14/2019, 09/15/2020, 09/17/2020, 10/01/2022    FLUAD High Dose 65 yr and older (44865) 09/30/2024    FLUZONE 6 months and older PFS 0.5 ml (76386) 10/13/2014    High Dose Fluzone Influenza Vaccine, 65yr+ PF 0.5mL (12591) 10/12/2015, 10/09/2016, 09/01/2017, 09/26/2017, 09/29/2018, 09/14/2019, 09/21/2021, 09/20/2023    Influenza 10/20/2013, 10/20/2013, 09/14/2019, 09/08/2020, 09/21/2021    Moderna Covid-19 Vaccine 50mcg/0.5ml 12yrs+ 09/20/2023, 09/30/2024    Pneumococcal (Prevnar 13) 10/25/2019    Pneumovax 23 10/05/2020    RSV, recombinant, RSVpreF, adjuvanted (Arexvy) 10/09/2023    TDAP 10/05/2020    Zoster Vaccine Recombinant Adjuvanted (Shingrix) 04/27/2023, 06/28/2023         Diet assessment: good     PLAN:  The patient indicates understanding of these issues and agrees to the plan.  Reinforced healthy diet, lifestyle, and exercise.    No follow-ups on file.       General Health     In the past six months, have you lost more than 10 pounds without trying?: 2 - No  Has your appetite been poor?: No  How does the patient maintain a good energy level?: Other  How would you describe your daily physical activity?: Light  How would you describe your current health state?: Fair  How do you maintain positive mental well-being?: Visiting Family     Supplementary Documentation:   Ben Key's SCREENING SCHEDULE   Tests on this list are recommended by your physician but may not be covered, or covered at this frequency, by your insurer.   Please check with your insurance carrier before scheduling to verify coverage.   PREVENTATIVE SERVICES FREQUENCY &  COVERAGE DETAILS LAST COMPLETION DATE   Diabetes Screening    Fasting Blood Sugar / Glucose    One screening every 12 months if never tested or  if previously tested but not diagnosed with pre-diabetes   One screening every 6 months if diagnosed with pre-diabetes Lab Results   Component Value Date     (H) 03/01/2024        Cardiovascular Disease Screening    Lipid Panel  Cholesterol  Lipoprotein (HDL)  Triglycerides Covered every 5 years for all Medicare beneficiaries without apparent signs or symptoms of cardiovascular disease Lab Results   Component Value Date    CHOLEST 176 01/26/2024    HDL 43 01/26/2024     (H) 01/26/2024    TRIG 106 01/26/2024         Electrocardiogram (EKG)   Covered if needed at Welcome to Medicare, and non-screening if indicated for medical reasons 02/03/2021      Ultrasound Screening for Abdominal Aortic Aneurysm (AAA) Covered once in a lifetime for one of the following risk factors    Men who are 65-75 years old and have ever smoked    Anyone with a family history -     Colorectal Cancer Screening  Covered for ages 50-85; only need ONE of the following:    Colonoscopy   Covered every 10 years    Covered every 2 years if patient is at high risk or previous colonoscopy was abnormal 07/31/2024    Health Maintenance   Topic Date Due    Colorectal Cancer Screening  Discontinued       Flexible Sigmoidoscopy   Covered every 4 years -    Fecal Occult Blood Test Covered annually -   Prostate Cancer Screening    Prostate-Specific Antigen (PSA) Annually Lab Results   Component Value Date    PSA 0.9 03/15/2018     Health Maintenance   Topic Date Due    PSA  01/26/2026      Immunizations    Influenza Covered once per flu season  Please get every year 09/30/2024  No recommendations at this time    Pneumococcal Each vaccine ([id number] & [id number]) covered once after 65 Prevnar 13: 10/25/2019    [id number]: 10/05/2020     No recommendations at this time    Hepatitis B One screening covered for patients with certain risk factors   -  No recommendations at this time    Tetanus Toxoid Not covered by Medicare Part B unless medically  necessary (cut with metal); may be covered with your pharmacy prescription benefits -    Tetanus, Diptheria and Pertusis TD and TDaP Not covered by Medicare Part B -  No recommendations at this time    Zoster Not covered by Medicare Part B; may be covered with your pharmacy  prescription benefits -  No recommendations at this time      Annual Monitoring of Persistent Medications (ACE/ARB, digoxin diuretics, anticonvulsants)    Potassium Annually Lab Results   Component Value Date    K 4.1 03/01/2024         Creatinine   Annually Lab Results   Component Value Date    CREATSERUM 1.24 03/01/2024         BUN Annually Lab Results   Component Value Date    BUN 24 (H) 03/01/2024       Drug Serum Conc Annually No results found for: \"DIGOXIN\", \"DIG\", \"VALP\"

## 2025-01-27 ENCOUNTER — OFFICE VISIT (OUTPATIENT)
Dept: INTERNAL MEDICINE CLINIC | Facility: CLINIC | Age: 75
End: 2025-01-27

## 2025-01-27 VITALS
TEMPERATURE: 98 F | OXYGEN SATURATION: 97 % | WEIGHT: 186 LBS | SYSTOLIC BLOOD PRESSURE: 130 MMHG | HEIGHT: 70 IN | BODY MASS INDEX: 26.63 KG/M2 | HEART RATE: 62 BPM | DIASTOLIC BLOOD PRESSURE: 78 MMHG

## 2025-01-27 DIAGNOSIS — Z00.00 ANNUAL PHYSICAL EXAM: Primary | ICD-10-CM

## 2025-01-27 DIAGNOSIS — G62.9 NEUROPATHY: ICD-10-CM

## 2025-01-27 DIAGNOSIS — Z91.89 AT RISK FOR FOOT PROBLEM: ICD-10-CM

## 2025-01-27 DIAGNOSIS — E78.2 MIXED HYPERLIPIDEMIA: ICD-10-CM

## 2025-01-27 DIAGNOSIS — F02.B0 MODERATE ALZHEIMER'S DEMENTIA WITHOUT BEHAVIORAL DISTURBANCE, PSYCHOTIC DISTURBANCE, MOOD DISTURBANCE, OR ANXIETY, UNSPECIFIED TIMING OF DEMENTIA ONSET (HCC): ICD-10-CM

## 2025-01-27 DIAGNOSIS — K11.7 HYPERSALIVATION: ICD-10-CM

## 2025-01-27 DIAGNOSIS — R25.2 MUSCLE CRAMPS: ICD-10-CM

## 2025-01-27 DIAGNOSIS — M48.02 CERVICAL SPINAL STENOSIS: ICD-10-CM

## 2025-01-27 DIAGNOSIS — L40.9 PSORIASIS: ICD-10-CM

## 2025-01-27 DIAGNOSIS — M54.31 RIGHT SIDED SCIATICA: ICD-10-CM

## 2025-01-27 DIAGNOSIS — Z00.00 ENCOUNTER FOR ANNUAL HEALTH EXAMINATION: ICD-10-CM

## 2025-01-27 DIAGNOSIS — I95.1 ORTHOSTATIC HYPOTENSION: ICD-10-CM

## 2025-01-27 DIAGNOSIS — Z13.29 SCREENING FOR THYROID DISORDER: ICD-10-CM

## 2025-01-27 DIAGNOSIS — Z86.0100 HISTORY OF COLON POLYPS: ICD-10-CM

## 2025-01-27 DIAGNOSIS — G30.9 MODERATE ALZHEIMER'S DEMENTIA WITHOUT BEHAVIORAL DISTURBANCE, PSYCHOTIC DISTURBANCE, MOOD DISTURBANCE, OR ANXIETY, UNSPECIFIED TIMING OF DEMENTIA ONSET (HCC): ICD-10-CM

## 2025-01-27 DIAGNOSIS — N40.0 BENIGN PROSTATIC HYPERPLASIA, UNSPECIFIED WHETHER LOWER URINARY TRACT SYMPTOMS PRESENT: ICD-10-CM

## 2025-01-27 DIAGNOSIS — M54.16 LUMBAR RADICULOPATHY: ICD-10-CM

## 2025-01-27 RX ORDER — SCOPOLAMINE 1 MG/3D
1 PATCH, EXTENDED RELEASE TRANSDERMAL
Qty: 24 PATCH | Refills: 0 | Status: SHIPPED | OUTPATIENT
Start: 2025-01-27

## 2025-01-28 ENCOUNTER — LAB ENCOUNTER (OUTPATIENT)
Dept: LAB | Facility: HOSPITAL | Age: 75
End: 2025-01-28
Attending: INTERNAL MEDICINE
Payer: MEDICARE

## 2025-01-28 ENCOUNTER — TELEPHONE (OUTPATIENT)
Dept: NEUROLOGY | Facility: CLINIC | Age: 75
End: 2025-01-28

## 2025-01-28 DIAGNOSIS — L98.0 PYOGENIC GRANULOMA: ICD-10-CM

## 2025-01-28 DIAGNOSIS — I95.1 ORTHOSTATIC HYPOTENSION: ICD-10-CM

## 2025-01-28 DIAGNOSIS — R25.2 MUSCLE CRAMPS: ICD-10-CM

## 2025-01-28 DIAGNOSIS — F02.B0 MODERATE ALZHEIMER'S DEMENTIA WITHOUT BEHAVIORAL DISTURBANCE, PSYCHOTIC DISTURBANCE, MOOD DISTURBANCE, OR ANXIETY, UNSPECIFIED TIMING OF DEMENTIA ONSET (HCC): ICD-10-CM

## 2025-01-28 DIAGNOSIS — G62.9 NEUROPATHY: ICD-10-CM

## 2025-01-28 DIAGNOSIS — M54.2 NECK PAIN: ICD-10-CM

## 2025-01-28 DIAGNOSIS — E78.2 MIXED HYPERLIPIDEMIA: ICD-10-CM

## 2025-01-28 DIAGNOSIS — Z13.29 SCREENING FOR THYROID DISORDER: ICD-10-CM

## 2025-01-28 DIAGNOSIS — G30.9 MODERATE ALZHEIMER'S DEMENTIA WITHOUT BEHAVIORAL DISTURBANCE, PSYCHOTIC DISTURBANCE, MOOD DISTURBANCE, OR ANXIETY, UNSPECIFIED TIMING OF DEMENTIA ONSET (HCC): ICD-10-CM

## 2025-01-28 DIAGNOSIS — L40.9 PSORIASIS: ICD-10-CM

## 2025-01-28 LAB
ALBUMIN SERPL-MCNC: 4.3 G/DL (ref 3.2–4.8)
ALBUMIN/GLOB SERPL: 1.5 {RATIO} (ref 1–2)
ALP LIVER SERPL-CCNC: 88 U/L
ALT SERPL-CCNC: 20 U/L
ANION GAP SERPL CALC-SCNC: 8 MMOL/L (ref 0–18)
AST SERPL-CCNC: 24 U/L (ref ?–34)
BASOPHILS # BLD AUTO: 0.05 X10(3) UL (ref 0–0.2)
BASOPHILS NFR BLD AUTO: 0.7 %
BILIRUB SERPL-MCNC: 0.7 MG/DL (ref 0.2–1.1)
BUN BLD-MCNC: 29 MG/DL (ref 9–23)
BUN/CREAT SERPL: 20.6 (ref 10–20)
CALCIUM BLD-MCNC: 9.9 MG/DL (ref 8.7–10.4)
CHLORIDE SERPL-SCNC: 110 MMOL/L (ref 98–112)
CHOLEST SERPL-MCNC: 164 MG/DL (ref ?–200)
CO2 SERPL-SCNC: 26 MMOL/L (ref 21–32)
CREAT BLD-MCNC: 1.41 MG/DL
DEPRECATED RDW RBC AUTO: 45 FL (ref 35.1–46.3)
EGFRCR SERPLBLD CKD-EPI 2021: 52 ML/MIN/1.73M2 (ref 60–?)
EOSINOPHIL # BLD AUTO: 0.16 X10(3) UL (ref 0–0.7)
EOSINOPHIL NFR BLD AUTO: 2.3 %
ERYTHROCYTE [DISTWIDTH] IN BLOOD BY AUTOMATED COUNT: 12.5 % (ref 11–15)
FASTING PATIENT LIPID ANSWER: YES
FASTING STATUS PATIENT QL REPORTED: YES
GLOBULIN PLAS-MCNC: 2.8 G/DL (ref 2–3.5)
GLUCOSE BLD-MCNC: 90 MG/DL (ref 70–99)
HCT VFR BLD AUTO: 39.5 %
HDLC SERPL-MCNC: 38 MG/DL (ref 40–59)
HGB BLD-MCNC: 13.3 G/DL
IMM GRANULOCYTES # BLD AUTO: 0.02 X10(3) UL (ref 0–1)
IMM GRANULOCYTES NFR BLD: 0.3 %
LDLC SERPL CALC-MCNC: 102 MG/DL (ref ?–100)
LYMPHOCYTES # BLD AUTO: 1.18 X10(3) UL (ref 1–4)
LYMPHOCYTES NFR BLD AUTO: 17.1 %
MAGNESIUM SERPL-MCNC: 2 MG/DL (ref 1.6–2.6)
MCH RBC QN AUTO: 32.7 PG (ref 26–34)
MCHC RBC AUTO-ENTMCNC: 33.7 G/DL (ref 31–37)
MCV RBC AUTO: 97.1 FL
MONOCYTES # BLD AUTO: 0.69 X10(3) UL (ref 0.1–1)
MONOCYTES NFR BLD AUTO: 10 %
NEUTROPHILS # BLD AUTO: 4.82 X10 (3) UL (ref 1.5–7.7)
NEUTROPHILS # BLD AUTO: 4.82 X10(3) UL (ref 1.5–7.7)
NEUTROPHILS NFR BLD AUTO: 69.6 %
NONHDLC SERPL-MCNC: 126 MG/DL (ref ?–130)
OSMOLALITY SERPL CALC.SUM OF ELEC: 303 MOSM/KG (ref 275–295)
PLATELET # BLD AUTO: 292 10(3)UL (ref 150–450)
POTASSIUM SERPL-SCNC: 3.8 MMOL/L (ref 3.5–5.1)
PROT SERPL-MCNC: 7.1 G/DL (ref 5.7–8.2)
RBC # BLD AUTO: 4.07 X10(6)UL
SODIUM SERPL-SCNC: 144 MMOL/L (ref 136–145)
TRIGL SERPL-MCNC: 131 MG/DL (ref 30–149)
TSI SER-ACNC: 2.35 UIU/ML (ref 0.55–4.78)
VIT B12 SERPL-MCNC: 1118 PG/ML (ref 211–911)
VLDLC SERPL CALC-MCNC: 22 MG/DL (ref 0–30)
WBC # BLD AUTO: 6.9 X10(3) UL (ref 4–11)

## 2025-01-28 PROCEDURE — 80053 COMPREHEN METABOLIC PANEL: CPT

## 2025-01-28 PROCEDURE — 82607 VITAMIN B-12: CPT

## 2025-01-28 PROCEDURE — 85025 COMPLETE CBC W/AUTO DIFF WBC: CPT

## 2025-01-28 PROCEDURE — 84443 ASSAY THYROID STIM HORMONE: CPT

## 2025-01-28 PROCEDURE — 83735 ASSAY OF MAGNESIUM: CPT

## 2025-01-28 PROCEDURE — 80061 LIPID PANEL: CPT

## 2025-01-28 PROCEDURE — 36415 COLL VENOUS BLD VENIPUNCTURE: CPT

## 2025-01-30 ENCOUNTER — TELEPHONE (OUTPATIENT)
Dept: INTERNAL MEDICINE CLINIC | Facility: CLINIC | Age: 75
End: 2025-01-30

## 2025-01-30 NOTE — TELEPHONE ENCOUNTER
Please refer the patient reviewed the blood work from 1/28  - A little dehydration on the blood work, but he was fasting so this should be okay with good water intake  - Vitamin B12 levels are at goal, if he is taking the vitamin B12 supplements at this time can continue with the current dosage without adjustments  - Cholesterol remains controlled  - All other blood work looks good    No medication changes for now, will await reevaluation with Dr. Bell

## 2025-01-30 NOTE — TELEPHONE ENCOUNTER
Patient contacted and relayed 's message below regarding the lab results. Patient verbalized understanding.

## 2025-01-31 ENCOUNTER — PATIENT MESSAGE (OUTPATIENT)
Dept: INTERNAL MEDICINE CLINIC | Facility: CLINIC | Age: 75
End: 2025-01-31

## 2025-02-04 ENCOUNTER — OFFICE VISIT (OUTPATIENT)
Dept: PHYSICAL MEDICINE AND REHAB | Facility: CLINIC | Age: 75
End: 2025-02-04
Payer: MEDICARE

## 2025-02-04 ENCOUNTER — TELEPHONE (OUTPATIENT)
Dept: PHYSICAL THERAPY | Facility: HOSPITAL | Age: 75
End: 2025-02-04

## 2025-02-04 VITALS — HEIGHT: 70 IN | WEIGHT: 186 LBS | BODY MASS INDEX: 26.63 KG/M2

## 2025-02-04 DIAGNOSIS — M51.16 LUMBAR DISC HERNIATION WITH RADICULOPATHY: Primary | ICD-10-CM

## 2025-02-04 PROBLEM — N18.30 CKD (CHRONIC KIDNEY DISEASE) STAGE 3, GFR 30-59 ML/MIN (HCC): Chronic | Status: ACTIVE | Noted: 2025-01-01

## 2025-02-04 PROBLEM — N18.30 CKD (CHRONIC KIDNEY DISEASE) STAGE 3, GFR 30-59 ML/MIN (HCC): Chronic | Status: ACTIVE | Noted: 2025-02-04

## 2025-02-04 PROCEDURE — 1160F RVW MEDS BY RX/DR IN RCRD: CPT | Performed by: PHYSICAL MEDICINE & REHABILITATION

## 2025-02-04 PROCEDURE — 1159F MED LIST DOCD IN RCRD: CPT | Performed by: PHYSICAL MEDICINE & REHABILITATION

## 2025-02-04 PROCEDURE — 99204 OFFICE O/P NEW MOD 45 MIN: CPT | Performed by: PHYSICAL MEDICINE & REHABILITATION

## 2025-02-04 NOTE — H&P
History and Physical    C/C:   Chief Complaint   Patient presents with    Low Back Pain     New patent, referred by Dr. Rodríguez, comes in with L side low back and buttock pain. Also complains of pain in R thigh, R knee and R calf. T/N in bilateral hands and feet. Admits some weankess in R leg. Rates pain 4/10. MRI of L-spine done. Denies PT. Symptoms began 12/2024, denies injury. Denies injecitons. Takes Gabapentin 200mg qNightly.     HPI: 74 year old male with history of L4-5 laminectomy in 2021, CKD presents with new onset right leg pain, weakness and mild low back pain. Began in December of 2024 without inciting event. He has been having cramping in the right calf, burning pain into the rgiht anterior knee. Traces right anterior thigh and calf pain. Right leg feels weak. Has been \"dragging\" the right foot. No falls. He has mild accompanying low back pain. Sitting and standing provokes back pain. Walking is not painful, though the leg is weak. Underwent an MRI of the lumbar spine. Wife states that she has seen some improvement in his ability to tolerate walking.     He has a history of left L4-L5 microdiscectomy in February 2021, done by Dr. Hernandez. Did quite well after the surgery with regards to back/leg pain, but had significant mental impairment following the anesthesia and wants to avoid that if possible.     Allergies: Allergies[1]     Patient-reported ROS: Denies fever, chills, unintended weight loss, night sweats       Physical exam:  Ht 70\"   Wt 186 lb (84.4 kg)   BMI 26.69 kg/m²     4/5 right ankle KE, DF. Normal EHL, ankle inversion, ankle eversion, PF  Decreased sensation to LT dorsum of the foot, medial aspect of the right lower leg  1/4 quad, gastrocs reflexes b/l  Babinski test negative bilaterally  Seated slump test negative    Imaging: MRI of the lumbar spine dated 1/7/2025 independently reviewed, as was reports.  He has a right paracentral/foraminal extrusion at L3-L4 with caudal migration,  impinging upon the right L4 nerve root within the canal    Assessment and plan:  1) lumbar disc herniation with radiculopathy  2) CKD    Lumbar radiculopathy with an associated foot drop.  My initial recommendation was neurosurgical consultation given the weakness, along with physical therapy to work to restore strength in the right lower extremity, however patient and wife are quite hesitant for him to undergo a surgical procedure that would require anesthesia.  He will start physical therapy for neutral to extension lumbar stabilization and for right lower extremity strengthening, continue gabapentin 200 mg nightly.  We discussed increasing the daytime dose, however patient defers as the radicular right leg pain has eased somewhat.  I discussed with them that the role for epidural steroid injection is primarily for reduction in radicular pain, but is not likely to help with the accompanying weakness.    I should see him in follow-up in 4 to 6 weeks.  At that time if all is going well he will probably still have some level of foot drop, though may see some some improved strength at that time.    Arian Bell DO  Physical Medicine and Rehabilitation  [facility]       [1]   Allergies  Allergen Reactions    Dupilumab SWELLING and Tightness in Throat    Mildew OTHER (SEE COMMENTS)     Sneezing    Mold OTHER (SEE COMMENTS)     Sneezing

## 2025-02-04 NOTE — PATIENT INSTRUCTIONS
1) If you have increased leg pain during the daytime you may increase the gabapentin to 100-200mg in the morning, 200mg at night time.   2) Walk to what you can tolerate.   3) start physical therapy.   4) Consider seeing neurosurgery.   5) Follow up in 4-6 weeks.

## 2025-02-06 ENCOUNTER — OFFICE VISIT (OUTPATIENT)
Dept: PHYSICAL THERAPY | Facility: HOSPITAL | Age: 75
End: 2025-02-06
Attending: PHYSICAL MEDICINE & REHABILITATION
Payer: MEDICARE

## 2025-02-06 DIAGNOSIS — M51.16 LUMBAR DISC HERNIATION WITH RADICULOPATHY: Primary | ICD-10-CM

## 2025-02-06 PROCEDURE — 97162 PT EVAL MOD COMPLEX 30 MIN: CPT | Performed by: PHYSICAL THERAPIST

## 2025-02-06 PROCEDURE — 97530 THERAPEUTIC ACTIVITIES: CPT | Performed by: PHYSICAL THERAPIST

## 2025-02-06 PROCEDURE — 97110 THERAPEUTIC EXERCISES: CPT | Performed by: PHYSICAL THERAPIST

## 2025-02-06 NOTE — PROGRESS NOTES
SPINE EVALUATION:     Diagnosis:   Lumbar disc herniation with radiculopathy (M51.16) Patient:  Ben Key (74 year old, male)        Referring Provider: Arian Bell  Today's Date   2/6/2025    Precautions:  Fall Risk; Other (use comment) (high fall risk with R foot drop, previous lumbar spine surgery)   Date of Evaluation: 02/06/25  Next MD visit: 4-5 weeks  Date of Surgery: L4-5 laminectomy, microdisctomy in 2021     PATIENT SUMMARY   Summary of chief complaints: Pain in the LB and R leg into the calf x 5-6 weeks.  States his leg pain is less intense now but gets cramping in his R calf.  History of current condition: Describes an insidous onset,  Denies falls   Pain level: current 3 /10, at best 3 /10, at worst 6 /10  Description of symptoms: R LE pain   Occupation: Retired   Leisure activities/Hobbies: visiting childrne and grandchildren   Prior level of function: independent in all activities, [facility]  Current limitations: limited standing and walking, limited ability to perform sit to stand, limited sitting tolerance, difficutly sleeping  Pt goals: decrease pain, improve his functional mobility without pain  Past medical history was reviewed with Ben.  Significant findings include: bilateral inquinal hernias  I  maging/Tests: CONCLUSION: 1. Multilevel degenerative disc disease throughout the lumbar spine, most notably at L4-L5, where extruded disc material contributes to moderate-severe spinal canal stenosis, right worse than left subarticular zone impingement of the descending nerve roots, and severe right greater than left foraminal impingement.   2. Mild spinal canal narrowing is present at L3-L4 with associated right greater than left subarticular zone and foraminal narrowing.   3. Additional degenerative changes as above described.   4. No acute osseous injury of the lumbar spine.   5. Nonspecific red marrow reconversion may be indicative of hematopoietic disturbance or could  be related to smoking.   6. Partially delineated distal colonic diverticulosis without MR evidence acute complication in the imaged volume.   7. Lesser incidental findings as above.     Ben  has a past medical history of Allergic rhinitis (Since childhood), Arthritis (12 years ago), Back problem, BPH (benign prostatic hyperplasia), Depression (When eczema started), Eczema, Hearing impairment, Hypotension, Hypotension, Lumbar disc herniation with radiculopathy, Pyogenic granuloma of skin (05/06/2024), Right leg weakness, and Visual impairment.    He  has a past surgical history that includes cholecystectomy; colonoscopy; spine surgery procedure unlisted (02/15/2021); other (11/2021); other (10/2021); Cataract extraction w/ intraocular lens implant (Left, 11/2021); Cataract extraction w/  intraocular lens implant (Left, 11/16/2021); Cataract extraction w/  intraocular lens implant (Right, 10/19/2021); back surgery (2/15/2021); cataract (2021); colonoscopy (April, 2021); tonsillectomy (As a child); adj tiss xfer head,fac,hand <10sqcm (Left, 05/07/2024); and colonoscopy (N/A, 7/31/2024).    ASSESSMENT  Ben presents to physical therapy evaluation with primary c/o Pain in the LB and R leg into the calf x 5-6 weeks.  States his leg pain is less intense now but gets cramping in his R calf.. The results of the objective tests and measures show decreased strength in R LE with R foot drop, decreased lumbar ROM, decresaed balance and high fall risk, decreased ability to alternate and reciprocate, (+) neutral tension signs. Functional deficits include but are not limited to limited standing and walking, limited ability to perform sit to stand, limited sitting tolerance, difficutly sleeping. Signs and symptoms are consistent with diagnosis of Lumbar disc herniation with radiculopathy (M51.16). Pt and PT discussed evaluation findings, pathology, POC and HEP.  Pt voiced understanding and performs HEP correctly without reported  pain. Skilled Physical Therapy is medically necessary to address the above impairments and reach functional goals.    OBJECTIVE:   Musculoskeletal:  Observation/Posture: leans his trunk to the R in sitting, wieght on L hip in sitting   Accessory Motion: NT   Palpation: TTP over the posterior aspect of the R knee     Special tests:   (+) SLR R     LINDSEY ROM WNL and Strength (5/5) except below:  (* denotes performed with pain)  Trunk ROM MMT (-/5)     Flex limited 25%       Ext limited 75%      R L R L     Side bend limited 75% limited 75%         Rotation           ,   Hip   ROM MMT (-/5)    R L R L     Flex (L2)     4 5     Ext              Abd             ER             IR             ,   Myotome MMT   MMT (-/5)    R L   Shoulder Abd (C5)       Elbow Ext (C6)       Elbow Flex (C7)       Thumb Ext (C8)       Digit Flex (C8)       Interossei (T1)       Hip Flex (L2) 4 5   Knee Ext (L3) 4 5   Ankle DF (L4) 3+ 5   Grt Toe Ext (L5) 3 5       Flexibility:  LE Flexibility R L     Hip Flexor         Hamstrings short short     ITB         Piriformis         Quads         Gastroc-soleus short shorrt        Neurological:  Sensation: Grossly intact to light touch LINDSEY UE/LE except decresaed sensation to light touch in the R lateral calf   Deep Tendon Reflexes: WNL except NT   UMN: signs and symptoms WNL except NT   Peripheral Neurodynamic: WNL except NA     Balance and Functional Mobility:  Gait: pt ambulates on level ground with decreased stance phase; decreased foot clearance; decreased arm swing; stooped posture/forward lean; difficulty turning; difficulty with initiation; drop foot; other (use comment) (drop foot R).  Balance: SLS: EO R 5 sec, EO L 5 sec         Today's Treatment and Response:   Pt education was provided on exam findings, treatment diagnosis, treatment plan, expectations, and prognosis.  Today's Treatment       2/6/2025   PT Treatment   Therapeutic Exercise - DF in sitting  - heel raises in sitting  - LAQ in  sitting   Therapeutic Activity - education on centralization of symptoms  - education that LBP might increase as his LE symptoms improve  - educated that strength loss is coming from the pressure on her nerve in his back  - education to avoid over fatiques with his HEP  - advised to be careful walking secondary to R foot drop   Therapeutic Exercise Min 8   Therapeutic Activity Min 15   Evaluation Min 30   Total of Timed Procedures 23   Total of Service Based 30   Total Treatment Time 53         Patient was instructed in and issued a HEP for:      Charges:  PT EVAL: Moderate Complexity, Eval Mod, TA1, TE1  In agreement with evaluation findings and clinical rationale, this evaluation involved MODERATE COMPLEXITY decision making due to 1-2 personal factors/comorbidities, 3 or more body structures involved/activity limitations, and evolving symptoms as documented in the evaluation.                                                                         PLAN OF CARE:    Goals: (to be met in 10 visits)   Goals       Therapy Goals      The pt will be able to walk 2-3 city blocks without an increase in symptoms.   The pt will be independent in his HEP   Centralization of symptoms to the lumbar spine.   The pt will report a 25% decrease in pain.   The pt will be able to sleep through the night without waking up from pain.               Frequency / Duration: Patient will be seen 1x/week or a total of 10  visits over a 90 day period. Treatment will include: Gait training; Neuromuscular Re-education; Self-Care Home Management; Therapeutic Activities; Therapeutic Exercise; Home Exercise Program instruction    Education or treatment limitation: None   Rehab Potential: fair     Oswestry Disability Index Score  68% 2/6/2025      Patient/Family/Caregiver was advised of these findings, precautions, and treatment options and has agreed to actively participate in planning and for this course of care.    Thank you for your referral.  Please co-sign or sign and return this letter via fax as soon as possible to [phone]. If you have any questions, please contact me at Dept: [phone]    Sincerely,  Electronically signed by therapist: Sylwia Lindsay PT  Physician's certification required: Yes  I certify the need for these services furnished under this plan of treatment and while under my care.    X___________________________________________________ Date____________________    Certification From: 2/6/2025  To:5/7/2025

## 2025-02-11 ENCOUNTER — OFFICE VISIT (OUTPATIENT)
Dept: PHYSICAL THERAPY | Facility: HOSPITAL | Age: 75
End: 2025-02-11
Attending: PHYSICAL MEDICINE & REHABILITATION
Payer: MEDICARE

## 2025-02-11 PROCEDURE — 97530 THERAPEUTIC ACTIVITIES: CPT | Performed by: PHYSICAL THERAPIST

## 2025-02-11 PROCEDURE — 97116 GAIT TRAINING THERAPY: CPT | Performed by: PHYSICAL THERAPIST

## 2025-02-11 PROCEDURE — 97110 THERAPEUTIC EXERCISES: CPT | Performed by: PHYSICAL THERAPIST

## 2025-02-11 NOTE — PROGRESS NOTES
Patient: Ben Key (74 year old, male) Referring Provider:  Insurance:   Diagnosis: Lumbar disc herniation with radiculopathy (M51.16) Arian BARRIENTOS   Date of Surgery: L4-5 laminectomy, microdisctomy in 2021 Next MD visit:  N/A   Precautions:  Fall Risk; Other (use comment) (high fall risk with R foot drop, previous lumbar spine surgery) 4-5 weeks Referral Information:    Date of Evaluation: Req: 0, Auth: 0, Exp:     02/06/25 POC Auth Visits:  10       Today's Date   2/11/2025    Subjective  States he did his HEP very day.  States he has more numbness in the R knee right after doing his HEP but states that resolved in a couple of minute.Reports he does feel like he is getting a little better.  States he could kick his leg up higher after a couple of days.  Denies any fall.  Tries to walk but states his foot slaps about 1/2 way through his walk.  States he is not having any pain.       Pain: 4/10     Objective  R foot drop during ambulation    R list in standing, bumps into objects on his R side  R trunk lean in sitting         Assessment  No adverse effects to treatment.  Th ept apeared more unstable this date wtih more R lean/forward ally and increased foot drag R.  He had difficulty turning with the R leg getting caught underneath him and reports holding onto furniture or his wife at home.  He fatiqued very quicklywith standing activities and had been advised to use the RW at all times secondary to his LE weakness.  Wife informed to be with the patient while walking and to him use the RW at all time.  The pt and his wife reported understanding of instructcions.    Goals (to be met in 10 visits)   Goals       Therapy Goals      The pt will be able to walk 2-3 city blocks without an increase in symptoms.   The pt will be independent in his HEP   Centralization of symptoms to the lumbar spine.   The pt will report a 25% decrease in pain.   The pt will be able to sleep through the night without  waking up from pain.              Plan  Continue PT - will attempt strengthening in supine and SL positions next visit secondary to incresed faitque with standing activities this date    Treatment Last 4 Visits       2/6/2025 2/11/2025   PT Treatment   Treatment Day  2   Therapeutic Exercise - DF in sitting  - heel raises in sitting  - LAQ in sitting - Calf raises in standing   - side stepping   - bar squats with CGA  - weight shifting with shadow boxing     Therapeutic Activity - education on centralization of symptoms  - education that LBP might increase as his LE symptoms improve  - educated that strength loss is coming from the pressure on her nerve in his back  - education to avoid over fatiques with his HEP  - advised to be careful walking secondary to R foot drop - sit to stand from the chair holding ball with CGA   Gait Training  - Gait training with a Straight Cane  - Gait training with rolling walker   Therapeutic Exercise Min 8 15   Therapeutic Activity Min 15 10   Gait Training Min  25   Evaluation Min 30    Total of Timed Procedures 23 50   Total of Service Based 30 0   Total Treatment Time 53 50         HEP  Continue his current HEP    Charges     TE1, TA1, Gait 1

## 2025-02-20 ENCOUNTER — OFFICE VISIT (OUTPATIENT)
Dept: NEUROLOGY | Facility: CLINIC | Age: 75
End: 2025-02-20
Payer: MEDICARE

## 2025-02-20 VITALS — BODY MASS INDEX: 26.77 KG/M2 | WEIGHT: 187 LBS | HEIGHT: 70 IN

## 2025-02-20 DIAGNOSIS — R41.9 NEUROCOGNITIVE DISORDER: Primary | ICD-10-CM

## 2025-02-20 DIAGNOSIS — E56.0 VITAMIN E DEFICIENCY: ICD-10-CM

## 2025-02-20 DIAGNOSIS — E53.8 VITAMIN B12 DEFICIENCY: ICD-10-CM

## 2025-02-20 DIAGNOSIS — G62.9 LENGTH-DEPENDENT PERIPHERAL NEUROPATHY: ICD-10-CM

## 2025-02-20 PROCEDURE — 99214 OFFICE O/P EST MOD 30 MIN: CPT | Performed by: OTHER

## 2025-02-20 PROCEDURE — 1160F RVW MEDS BY RX/DR IN RCRD: CPT | Performed by: OTHER

## 2025-02-20 PROCEDURE — 1159F MED LIST DOCD IN RCRD: CPT | Performed by: OTHER

## 2025-02-20 RX ORDER — SODIUM FLUORIDE 6 MG/ML
PASTE, DENTIFRICE DENTAL
COMMUNITY
Start: 2025-01-28

## 2025-02-20 RX ORDER — DONEPEZIL HYDROCHLORIDE 5 MG/1
5 TABLET, FILM COATED ORAL NIGHTLY
Qty: 90 TABLET | Refills: 3 | Status: SHIPPED | OUTPATIENT
Start: 2025-02-20 | End: 2026-02-15

## 2025-02-20 NOTE — PROGRESS NOTES
[facility]  [address], [address]  [facility] [address]  [phone]          Established  Follow Up Visit       Date: February 20, 2025  Patient Name: Ben Key   MRN: [id number]  Primary physician: Tori Romero  [facility] [address]    Interval History:   --Doing well.  Still dizzy, described as unsteady on his feet, not lightheaded or vertiginous.  Not taking vitamin B12 anymore.  Has been having hypersalivation, tried Scopolamine patch but it led to confusion, so he stopped taking it.  Hypersalivation has been going on for about 4 months.  Has been going to PT for lumbar spine herniated disc.       OUTPATIENT MEDICATIONS  Medications Ordered Prior to Encounter[1]      PHYSICAL EXAM:   Ht 70\"   Wt 187 lb (84.8 kg)   BMI 26.83 kg/m²   General appearance: Well appearing, and in no acute distress  Skin: skin color, texture normal.  No rashes or lesions.    Head: Normocephalic, atraumatic.    Neurological exam:    Mental Status:   Attention/Concentration: intact attention on bedside test   Fund of knowledge: reduced   Speech: no dysarthria or aphasia   Affect: normal, pleasant and cooperative     LABS/DATA:  Reviewed most recent CBC, CMP, vitamin B12, , TSH, magnesium      IMAGING:  MRI lumbar spine  CONCLUSION:   1. Multilevel degenerative disc disease throughout the lumbar spine, most notably at L4-L5, where extruded disc material contributes to moderate-severe spinal canal stenosis, right worse than left subarticular zone impingement of the descending nerve   roots, and severe right greater than left foraminal impingement.      2. Mild spinal canal narrowing is present at L3-L4 with associated right greater than left subarticular zone and foraminal narrowing.   I PERSONALLY REVIEWED THESE IMAGES.     ASSESSMENT:  The patient is a 74 year old man with past medical history of Alzheimer's disease, peripheral neuropathy the setting of vitamin B12 deficiency,  hyperlipidemia, depression, erythroderma, lumbar radiculopathy, BPH, hearing loss, MGUS who presents for follow-up.      -MRI brain 6/16/2023 with no acute findings, minor white matter changes, generalized volume loss; noted temporal lobe atrophy not mentioned on radiological report  -MRI cervical spine 7/17/2023 is significantly motion degraded, multilevel degenerative changes worse at C3-4 with some mass effect on the ventral cord though cord itself appears normal  -Normal/negative myasthenia gravis panel including MuSK antibodies, TSH, ammonia, aldolase, methylmalonic acid, ceruloplasmin, paraneoplastic panel, vitamin B12, RPR, vitamin B6, vitamin B1 folic acid, copper  -Minimally elevated CK  -Vitamin E deficiency   -MGUS  - --> 102   -BMP magnesium normal, vitamin E levels low  -Neuropsychological testing more indicative of major depression with mild cognitive impairment.  Primary deficit working memory.     Neurological examination was significant for MoCA of 13/30 with global deficits and a length dependent peripheral neuropathy.     Neurocognitive disorder: Alzheimer's disease, pseudodementia   Vitamin E deficiency may be contributing to cognitive impairment; vtamin E supplementation completed   Poor sleep  -Patient did not want to schedule PET scan.  -He is not driving anymore.   -Reduce Donepezil to 5 mg daily to see if helps with hypersalivation (and potentially any dizziness if there is a bradycardia-related component)   -Continue memantine 10 mg daily (renal dosing)       Length-dependent peripheral neuropathy in the setting of history of vitamin B12 deficiency, vitamin E deficiency, chronic kidney disease.  Status post vitamin B12 and vitamin E supplementation   Leg cramps probably also related to neuropathy  - Multivitamin  - Fall precautions   -Physical therapy  - Podiatry, has been referred by primary care physician    Follow-up in about 3 to 4 months    Discussed indication, administration,  dose, and side effects with patient of any medications personally prescribed. Patient was advised to let my office know if they have any questions or concerns.       Today, I personally spent 20 minutes in this case, including chart review, time spent with patient doing face to face evaluation w/ interview and exam and patient education, counseling, and time was spent in patient education, counseling, and coordination of care as described above.   Issues discussed: Diagnosis and implications on future health, benefits and side effects of present and future medications, test results as well as further testing and medications required.    This note was prepared using Dragon Medical voice recognition dictation software and as a result, errors may occur. When identified, these errors have been corrected. While every attempt is made to correct errors during dictation, discrepancies may still exist    DAVID Martinez DO   Staff Physician, Neurology   2/20/2025  9:11 AM               [1]   Current Outpatient Medications on File Prior to Visit   Medication Sig Dispense Refill    SODIUM FLUORIDE 5000 PPM 1.1 % Dental Paste USE FOR 4-5 MINS AT NIGHT NO DRINKING OR RINSING AFTER      gabapentin 100 MG Oral Cap Take 2 capsules (200 mg total) by mouth nightly. (Patient taking differently: Take 1 capsule (100 mg total) by mouth nightly.) 90 capsule 1    memantine 10 MG Oral Tab Take 1 tablet (10 mg total) by mouth daily. 90 tablet 3    DONEPEZIL 10 MG Oral Tab TAKE 1 TABLET BY MOUTH EVERY DAY AT NIGHT 90 tablet 3    midodrine 5 MG Oral Tab Take 1 tablet (5 mg total) by mouth in the morning and 1 tablet (5 mg total) at noon and 1 tablet (5 mg total) in the evening. 270 tablet 3    Upadacitinib ER (RINVOQ) 15 MG Oral Tablet 24 Hr Take 1 tablet by mouth daily.      melatonin 5 MG Oral Cap Take 1 capsule (5 mg total) by mouth nightly.      acetaminophen 500 MG Oral Tab Take 2 tablets (1,000 mg total) by mouth in the morning, at noon, and  at bedtime.  0    Scopolamine 1.5mg TD patch 1mg/3days Place 1 patch onto the skin every third day. 24 patch 0    HYDROcodone-acetaminophen (NORCO) 5-325 MG Oral Tab Take 1 tablet by mouth every 6 (six) hours as needed for Pain. (Patient not taking: Reported on 1/27/2025) 20 tablet 0    cyclobenzaprine 10 MG Oral Tab Take 1 tablet (10 mg total) by mouth 3 (three) times daily as needed for Muscle spasms. (Patient not taking: Reported on 2/20/2025) 30 tablet 1    Multiple Vitamins-Minerals (CENTRUM SILVER 50+MEN) Oral Tab every other day. (Patient not taking: Reported on 1/27/2025)      Cyanocobalamin (VITAMIN B12) 500 MCG Oral Tab Take 500 mcg by mouth daily.       No current facility-administered medications on file prior to visit.

## 2025-03-03 ENCOUNTER — OFFICE VISIT (OUTPATIENT)
Dept: PODIATRY CLINIC | Facility: CLINIC | Age: 75
End: 2025-03-03
Payer: MEDICARE

## 2025-03-03 DIAGNOSIS — G62.9 NEUROPATHY: ICD-10-CM

## 2025-03-03 DIAGNOSIS — L60.3 NAIL DYSTROPHY: Primary | ICD-10-CM

## 2025-03-03 PROCEDURE — 99203 OFFICE O/P NEW LOW 30 MIN: CPT | Performed by: STUDENT IN AN ORGANIZED HEALTH CARE EDUCATION/TRAINING PROGRAM

## 2025-03-03 PROCEDURE — 1159F MED LIST DOCD IN RCRD: CPT | Performed by: STUDENT IN AN ORGANIZED HEALTH CARE EDUCATION/TRAINING PROGRAM

## 2025-03-03 NOTE — PROGRESS NOTES
[facility] Podiatry  Progress Note      eBn Key is a 74 year old male.   Chief Complaint   Patient presents with    Toenail Care     New pt-  nail trim and foot check - stated has intermittent numbness and tingling - LOV w/ PCP Dr. Rodríguez on 01/27/2025         HPI:     Patient is a pleasant 74-year-old male presents to clinic accompanied by his wife for bilateral foot evaluation.  He has got intermittent numbness and tingling to lower extremity due to back degeneration.  Patient does take gabapentin.  Admits to difficulty trimming his toenails.    Allergies: Dupilumab, Mildew, and Mold    Current Outpatient Medications   Medication Sig Dispense Refill    SODIUM FLUORIDE 5000 PPM 1.1 % Dental Paste USE FOR 4-5 MINS AT NIGHT NO DRINKING OR RINSING AFTER      donepezil 5 MG Oral Tab Take 1 tablet (5 mg total) by mouth nightly. 90 tablet 3    gabapentin 100 MG Oral Cap Take 2 capsules (200 mg total) by mouth nightly. (Patient taking differently: Take 1 capsule (100 mg total) by mouth nightly.) 90 capsule 1    memantine 10 MG Oral Tab Take 1 tablet (10 mg total) by mouth daily. 90 tablet 3    midodrine 5 MG Oral Tab Take 1 tablet (5 mg total) by mouth in the morning and 1 tablet (5 mg total) at noon and 1 tablet (5 mg total) in the evening. 270 tablet 3    Upadacitinib ER (RINVOQ) 15 MG Oral Tablet 24 Hr Take 1 tablet by mouth daily.      melatonin 5 MG Oral Cap Take 1 capsule (5 mg total) by mouth nightly.      acetaminophen 500 MG Oral Tab Take 2 tablets (1,000 mg total) by mouth in the morning, at noon, and at bedtime.  0      Past Medical History:    Allergic rhinitis    Hayfever    Arthritis    Work related    Back problem    BPH (benign prostatic hyperplasia)    Depression    Eczema    Hearing impairment    bilateral    Hypotension    orthostatic    Hypotension    Lumbar disc herniation with radiculopathy    Pyogenic granuloma of skin    Left thumb pyogenic granuloma    Right leg weakness    Visual  impairment    glasses      Past Surgical History:   Procedure Laterality Date    Adj tiss xfer head,fac,hand <10sqcm Left 2024    Wide excision pyogenic granuloma L thumb,flap reconstruction    Back surgery  2/15/2021    Clean out disc    Cataract      Cataract extraction w/  intraocular lens implant Left 2021    PC IOL topical IV sedation- Dr. Barriga @ [facility]    Cataract extraction w/  intraocular lens implant Right 10/19/2021    Dr. Barriga    Cataract extraction w/ intraocular lens implant Left 2021    Dr. Barriga     Cholecystectomy      Colonoscopy      Colonoscopy      Colonoscopy N/A 2024    Procedure: COLONOSCOPY;  Surgeon: Khai Doty MD;  Location: [facility] ENDOSCOPY    Other  2021    left eye     Other  10/2021    right eye    Spine surgery procedure unlisted  02/15/2021    LEFT LUMBAR 4 -LUMBAR 5 MICRODISCECTOMY per Dr. Hernandez    Tonsillectomy  As a child      Family History   Problem Relation Age of Onset    Other (Dementia) Mother         Late 70s    Glaucoma Mother     Other (partial colectomy) Mother     Dementia Mother         In later years    Depression Mother     Heart Disorder Father         Bypass surgery    Macular degeneration Neg     Diabetes Neg       Social History     Socioeconomic History    Marital status:    Tobacco Use    Smoking status: Former     Current packs/day: 0.00     Types: Cigarettes     Quit date: 1976     Years since quittin.9     Passive exposure: Past    Smokeless tobacco: Never    Tobacco comments:     Long time smoker, finally quit under doctor care   Vaping Use    Vaping status: Never Used   Substance and Sexual Activity    Alcohol use: Not Currently     Comment: Due to medications I have not drank for several years.    Drug use: Never   Other Topics Concern    Caffeine Concern Yes     Comment: soda 4 cans a day    Exercise No    Seat Belt No    Special Diet No    Stress Concern No    Weight Concern No    Pt has a  pacemaker No    Pt has a defibrillator No    Reaction to local anesthetic No           REVIEW OF SYSTEMS:     Denies nause, fever, chills  No calf pain  Denies chest pain or SOB      EXAM:   There were no vitals taken for this visit.  GENERAL: well developed, well nourished, in no apparent distress  EXTREMITIES:   1. Integument: Normal skin temperature and turgor. Toenails x 10 are elongated and incurvated  2. Vascular: Dorsalis pedis two out of four bilateral and posterior tibial pulses two out of   four bilateral, capillary refill normal.   3. Musculoskeletal: All muscle groups are graded 5 out of 5 in the foot and ankle.   4. Neurological: Normal sharp dull sensation; reflexes normal.             ASSESSMENT AND PLAN:   Diagnoses and all orders for this visit:    Nail dystrophy    Neuropathy        Plan:       -Patient examined, chart history reviewed.  -Discussed importance of proper pedal hygiene, regular foot checks, and tight glucose control.  -Sharply debrided nails x10 with a sterile nail nipper achieving a 20% reduction in thickness and length, without incident.   -Ambulate with supportive shoes and inserts and avoid walking barefoot.  -Educated patient on acute signs of infection advised patient to seek immediate medical attention if symptoms arise.    RTC in 3 weeks    The patient indicates understanding of these issues and agrees to the plan.        Cielo Mckeon DPM

## 2025-03-04 ENCOUNTER — TELEPHONE (OUTPATIENT)
Dept: PHYSICAL MEDICINE AND REHAB | Facility: CLINIC | Age: 75
End: 2025-03-04

## 2025-03-04 ENCOUNTER — OFFICE VISIT (OUTPATIENT)
Dept: PHYSICAL MEDICINE AND REHAB | Facility: CLINIC | Age: 75
End: 2025-03-04
Payer: MEDICARE

## 2025-03-04 VITALS — WEIGHT: 187 LBS | BODY MASS INDEX: 26.77 KG/M2 | HEIGHT: 70 IN

## 2025-03-04 DIAGNOSIS — M51.16 LUMBAR DISC HERNIATION WITH RADICULOPATHY: Primary | ICD-10-CM

## 2025-03-04 PROBLEM — J41.0 SMOKERS' COUGH (HCC): Chronic | Status: ACTIVE | Noted: 2025-01-01

## 2025-03-04 PROBLEM — J41.0 SMOKERS' COUGH (HCC): Chronic | Status: ACTIVE | Noted: 2025-03-04

## 2025-03-04 PROCEDURE — 99214 OFFICE O/P EST MOD 30 MIN: CPT | Performed by: PHYSICAL MEDICINE & REHABILITATION

## 2025-03-04 PROCEDURE — 1159F MED LIST DOCD IN RCRD: CPT | Performed by: PHYSICAL MEDICINE & REHABILITATION

## 2025-03-04 PROCEDURE — 1160F RVW MEDS BY RX/DR IN RCRD: CPT | Performed by: PHYSICAL MEDICINE & REHABILITATION

## 2025-03-04 NOTE — TELEPHONE ENCOUNTER
Patient has been scheduled for Right L5 transforaminal epidural steroid injection on 3/10/25 at the [facility] with Dr. Bell.   Anesthesia type:  Local  Please note: The [facility] will call the business day prior to discuss the exact time/arrival and additional instructions for your appointment.  Patient was advised that if he/she does receive the covid vaccine it needs to be at least 2 weeks before or after the injection.  Medications and allergies reviewed.  Educated to hold NSAIDS (Aleve, Ibuprofen, Motrin, Advil) and anti-inflammatories (Meloxicam, Naproxen, Diclofenac, Celebrex) and for cervical injections must hold Multi-Vitamins, Vitamin E, Fish Oil/Omega-3.  Patient informed of [facility]'s  policy:  The patient will require transportation arrangements to and from the procedure, with the  present on site for the entire visit.  Without a , the appointment is subject to cancellation.    [facility] is located in the [facility] 1st floor [address], [place] [address].   may park in the yellow/purple parking lot.  Patient verbalized understanding and agrees with plan.  Scheduled in Epic: Yes  Scheduled in Surgical Case: Yes  Follow up appointment made: NOV: Visit date not found

## 2025-03-04 NOTE — TELEPHONE ENCOUNTER
Initiated authorization for Right L5 TFESI under fluoroscopy. CPT/HCPCS 09978 dx:M51.16 to be done at [facility] with [employer] portal.    Status: Approved  Reference/Authorization # [id number]  Valid: 3/4/25-8/31/25  Authorization is not a guarantee of payment and may be subject to review once claim is submitted.

## 2025-03-04 NOTE — PROGRESS NOTES
Progress note    C/C:   Chief Complaint   Patient presents with    Follow - Up     LOV 02/04/2025 Follow up on back pain. Numbness and tingling throughout the right leg. Pain in right hip. Taking gabapentin. Two sessions of physical therapy, next session is in March. Reports pins and needles throughout back. Pain level 5/10      HPI: 74-year-old male presents for follow-up.  Since last time I saw him he has done 2 sessions of physical therapy.  Continues to have weakness in the right leg and what he describes as a \"shadow\" of pain in the right knee and lateral thigh.  If he stands for long enough he will need to sit due to both a sense of weakness and pain in the right leg.  Has been doing HEP. No falls, but has a had a few close calls. Sometimes uses a RW.  There is associated numbness and tingling in the thigh to the knee, and occasionally in the lower leg. Takes gabapentin 100mg at night time without side effects.     Pertinent allergies: Allergies[1]     Physical exam:  Ht 70\"   Wt 187 lb (84.8 kg)   BMI 26.83 kg/m²      Lumbar spine exam:    ROM deferred due to poor balance  5/5 hip flexion. There is give way strength in the right moreso than left lower extremities that makes it difficult to accurately grade his strength. He has at least 4/5 strength in both distal lower extremities.  Decreased sensation to LT right dorsum of the foot, medial aspect of the lower leg  1/4 quad, gastrocs reflexes b/l  Babinski test negative bilaterally  Seated slump test + for right leg symptoms    Imaging: no new imaging to review    Assessment and plan  Lumbar disc herniation with radiculopathy  CKD    On description, it sounds as though he is limited somewhat by pain, enough where it seems reasonable to do a epidural steroid injection and see if this helps him tolerate movement in a better fashion. I discussed with him and his wife that the injection alone will not help restore strength in the right leg, and he is going to  need to continue PT and HEP for that. We discussed the risks of procedure, including bleeding, infection, nerve injury, HA; elects to proceed. Continue current dose of gabapentin.     Arian Bell DO  Physical Medicine and Rehabilitation  [facility]       [1]   Allergies  Allergen Reactions    Dupilumab SWELLING and Tightness in Throat    Mildew OTHER (SEE COMMENTS)     Sneezing    Mold OTHER (SEE COMMENTS)     Sneezing

## 2025-03-05 ENCOUNTER — TELEPHONE (OUTPATIENT)
Age: 75
End: 2025-03-05

## 2025-03-05 NOTE — TELEPHONE ENCOUNTER
M for the patient's daughter Ирина to reschedule her Father's appointment with Dr. Bustamante. 3/5/25

## 2025-03-07 ENCOUNTER — LAB ENCOUNTER (OUTPATIENT)
Dept: LAB | Facility: HOSPITAL | Age: 75
End: 2025-03-07
Attending: INTERNAL MEDICINE
Payer: MEDICARE

## 2025-03-07 DIAGNOSIS — D47.2 MGUS (MONOCLONAL GAMMOPATHY OF UNKNOWN SIGNIFICANCE): ICD-10-CM

## 2025-03-07 DIAGNOSIS — G62.9 NEUROPATHY: ICD-10-CM

## 2025-03-07 LAB
ALBUMIN SERPL-MCNC: 4.3 G/DL (ref 3.2–4.8)
ALBUMIN/GLOB SERPL: 1.5 {RATIO} (ref 1–2)
ALP LIVER SERPL-CCNC: 87 U/L
ALT SERPL-CCNC: 21 U/L
ANION GAP SERPL CALC-SCNC: 7 MMOL/L (ref 0–18)
AST SERPL-CCNC: 27 U/L (ref ?–34)
BASOPHILS # BLD AUTO: 0.03 X10(3) UL (ref 0–0.2)
BASOPHILS NFR BLD AUTO: 0.4 %
BILIRUB SERPL-MCNC: 0.6 MG/DL (ref 0.2–1.1)
BUN BLD-MCNC: 27 MG/DL (ref 9–23)
BUN/CREAT SERPL: 20.5 (ref 10–20)
CALCIUM BLD-MCNC: 9.2 MG/DL (ref 8.7–10.4)
CHLORIDE SERPL-SCNC: 106 MMOL/L (ref 98–112)
CO2 SERPL-SCNC: 26 MMOL/L (ref 21–32)
CREAT BLD-MCNC: 1.32 MG/DL
DEPRECATED RDW RBC AUTO: 44.2 FL (ref 35.1–46.3)
EGFRCR SERPLBLD CKD-EPI 2021: 56 ML/MIN/1.73M2 (ref 60–?)
EOSINOPHIL # BLD AUTO: 0.15 X10(3) UL (ref 0–0.7)
EOSINOPHIL NFR BLD AUTO: 2.1 %
ERYTHROCYTE [DISTWIDTH] IN BLOOD BY AUTOMATED COUNT: 12.3 % (ref 11–15)
FASTING STATUS PATIENT QL REPORTED: YES
GLOBULIN PLAS-MCNC: 2.9 G/DL (ref 2–3.5)
GLUCOSE BLD-MCNC: 85 MG/DL (ref 70–99)
HCT VFR BLD AUTO: 37.7 %
HGB BLD-MCNC: 12.7 G/DL
IGA SERPL-MCNC: 153 MG/DL (ref 40–350)
IGM SERPL-MCNC: 105.6 MG/DL (ref 50–300)
IMM GRANULOCYTES # BLD AUTO: 0.03 X10(3) UL (ref 0–1)
IMM GRANULOCYTES NFR BLD: 0.4 %
IMMUNOGLOBULIN PNL SER-MCNC: 1339 MG/DL (ref 650–1600)
LYMPHOCYTES # BLD AUTO: 1.13 X10(3) UL (ref 1–4)
LYMPHOCYTES NFR BLD AUTO: 15.9 %
MCH RBC QN AUTO: 33.2 PG (ref 26–34)
MCHC RBC AUTO-ENTMCNC: 33.7 G/DL (ref 31–37)
MCV RBC AUTO: 98.4 FL
MONOCYTES # BLD AUTO: 0.79 X10(3) UL (ref 0.1–1)
MONOCYTES NFR BLD AUTO: 11.1 %
NEUTROPHILS # BLD AUTO: 4.97 X10 (3) UL (ref 1.5–7.7)
NEUTROPHILS # BLD AUTO: 4.97 X10(3) UL (ref 1.5–7.7)
NEUTROPHILS NFR BLD AUTO: 70.1 %
OSMOLALITY SERPL CALC.SUM OF ELEC: 292 MOSM/KG (ref 275–295)
PLATELET # BLD AUTO: 281 10(3)UL (ref 150–450)
POTASSIUM SERPL-SCNC: 3.9 MMOL/L (ref 3.5–5.1)
PROT SERPL-MCNC: 7.2 G/DL (ref 5.7–8.2)
RBC # BLD AUTO: 3.83 X10(6)UL
SODIUM SERPL-SCNC: 139 MMOL/L (ref 136–145)
WBC # BLD AUTO: 7.1 X10(3) UL (ref 4–11)

## 2025-03-07 PROCEDURE — 84165 PROTEIN E-PHORESIS SERUM: CPT

## 2025-03-07 PROCEDURE — 83521 IG LIGHT CHAINS FREE EACH: CPT

## 2025-03-07 PROCEDURE — 82784 ASSAY IGA/IGD/IGG/IGM EACH: CPT

## 2025-03-07 PROCEDURE — 86334 IMMUNOFIX E-PHORESIS SERUM: CPT

## 2025-03-07 PROCEDURE — 85025 COMPLETE CBC W/AUTO DIFF WBC: CPT

## 2025-03-07 PROCEDURE — 36415 COLL VENOUS BLD VENIPUNCTURE: CPT

## 2025-03-07 PROCEDURE — 80053 COMPREHEN METABOLIC PANEL: CPT

## 2025-03-10 ENCOUNTER — APPOINTMENT (OUTPATIENT)
Age: 75
End: 2025-03-10
Attending: STUDENT IN AN ORGANIZED HEALTH CARE EDUCATION/TRAINING PROGRAM
Payer: MEDICARE

## 2025-03-10 ENCOUNTER — TELEPHONE (OUTPATIENT)
Dept: INTERNAL MEDICINE CLINIC | Facility: CLINIC | Age: 75
End: 2025-03-10

## 2025-03-10 LAB
ALBUMIN SERPL ELPH-MCNC: 3.93 G/DL (ref 3.75–5.21)
ALBUMIN/GLOB SERPL: 1.33 {RATIO} (ref 1–2)
ALPHA1 GLOB SERPL ELPH-MCNC: 0.28 G/DL (ref 0.19–0.46)
ALPHA2 GLOB SERPL ELPH-MCNC: 0.75 G/DL (ref 0.48–1.05)
B-GLOBULIN SERPL ELPH-MCNC: 1.41 G/DL (ref 0.68–1.23)
GAMMA GLOB SERPL ELPH-MCNC: 0.53 G/DL (ref 0.62–1.7)
KAPPA LC FREE SER-MCNC: 2.14 MG/DL (ref 0.33–1.94)
KAPPA LC FREE/LAMBDA FREE SER NEPH: 0.83 {RATIO} (ref 0.26–1.65)
LAMBDA LC FREE SERPL-MCNC: 2.58 MG/DL (ref 0.57–2.63)
PROT SERPL-MCNC: 6.9 G/DL (ref 5.7–8.2)

## 2025-03-10 NOTE — TELEPHONE ENCOUNTER
Per 's 1/27/2025 office visit note, \"Take gabapentin 200 mg nightly.\" Per med module and 's 3/4/2025 office visit note, patient taking Gabapentin 100mg nightly.    To Bonita to please review the change in directions

## 2025-03-13 RX ORDER — GABAPENTIN 100 MG/1
200 CAPSULE ORAL NIGHTLY
Qty: 180 CAPSULE | Refills: 1 | Status: SHIPPED | OUTPATIENT
Start: 2025-03-13

## 2025-03-13 NOTE — TELEPHONE ENCOUNTER
Spoke to pt - he reports he is following  prescribing 20 mg at bedtime;  pt requests 90 day supply    Refill request is for a maintenance medication and has met the criteria specified in the Ambulatory Medication Refill Standing Order for eligibility, visits, laboratory, alerts and was sent to the requested pharmacy.    Requested Prescriptions     Signed Prescriptions Disp Refills    gabapentin 100 MG Oral Cap 180 capsule 1     Sig: TAKE 2 CAPSULES BY MOUTH NIGHTLY     Authorizing Provider: BERNIE MAZARIEGOS     Ordering User: JOVANY VILLEGAS

## 2025-03-20 ENCOUNTER — OFFICE VISIT (OUTPATIENT)
Dept: PHYSICAL THERAPY | Facility: HOSPITAL | Age: 75
End: 2025-03-20
Attending: PHYSICAL MEDICINE & REHABILITATION
Payer: MEDICARE

## 2025-03-20 PROCEDURE — 97110 THERAPEUTIC EXERCISES: CPT | Performed by: PHYSICAL THERAPIST

## 2025-03-20 PROCEDURE — 97530 THERAPEUTIC ACTIVITIES: CPT | Performed by: PHYSICAL THERAPIST

## 2025-03-21 NOTE — PROGRESS NOTES
Patient: Ben Key (74 year old, male) Referring Provider:  Insurance:   Diagnosis: Lumbar disc herniation with radiculopathy (M51.16) [facility]   Date of Surgery: L4-5 laminectomy, microdisctomy in 2021 Next MD visit:  N/A   Precautions:  Fall Risk; Other (use comment) (high fall risk with R foot drop, previous lumbar spine surgery) 4-5 weeks Referral Information:    Date of Evaluation: Req: 0, Auth: 0, Exp:     02/06/25 POC Auth Visits:  10       Today's Date   3/20/2025    Subjective  Reported he had a BONY with Dr. Bell.  Reports he has less pain but does experience burning in his R knee. Denies falls since the last visit.  States he is using the rolling walker more often because of his wife's insistance.       Pain: 2/10     Objective  R DF strength 4-5       Assessment  No adverse effects to treatment. The pt reports using his silver walker more than he use to. Dispalsy improve R DF strength from last visit.  Continues to have difficutly with spactial awareness and following directions.  Updated HEP with chair exercises one for safety.    Goals (to be met in 10 visits)   Therapy Goals         The pt will be able to walk 2-3 city blocks without an increase in symptoms.   The pt will be independent in his HEP   Centralization of symptoms to the lumbar spine.   The pt will report a 25% decrease in pain.   The pt will be able to sleep through the night without waking up from pain.           Plan  Continue PT  - continue fucnctional strrengthing and safety training    Treatment Last 4 Visits       2/6/2025 2/11/2025 3/20/2025   PT Treatment   Treatment Day  2 3   Therapeutic Exercise - DF in sitting  - heel raises in sitting  - LAQ in sitting - Calf raises in standing   - side stepping   - bar squats with CGA  - weight shifting with shadow boxing      Therapeutic Activity - education on centralization of symptoms  - education that LBP might increase as his LE symptoms improve  - educated  that strength loss is coming from the pressure on her nerve in his back  - education to avoid over fatiques with his HEP  - advised to be careful walking secondary to R foot drop - sit to stand from the chair holding ball with CGA    Gait Training  - Gait training with a Straight Cane  - Gait training with rolling walker    Therapeutic Exercise Min 8 15    Therapeutic Activity Min 15 10    Gait Training Min  25    Evaluation Min 30     Total of Timed Procedures 23 50    Total of Service Based 30 0    Total Treatment Time 53 50           2/11/2025 3/20/2025   Spine Treatment   Treatment Day 2 3   Therapeutic Exercise  - LAQ  - Marching in sitting  - DF in sitting  - ball squeeze in sitting  - marching in standing       Therapeutic Activity  - sit to stand from chair  - side stepping at the bar  - walker height adjustment/tennis balls placed         Therapeutic Exercise Minutes  30   Therapeutic Activity Minutes  15   Total Time Of Timed Procedures  45   Total Time Of Service-Based Procedures  0   Total Treatment Time  45   HEP  - ball squeeze in sitting  - marching in sitting  - DF in sitting        HEP  - ball squeeze in sitting  - marching in sitting  - DF in sitting    Charges  TE2, TA1

## 2025-03-27 ENCOUNTER — OFFICE VISIT (OUTPATIENT)
Dept: PHYSICAL THERAPY | Facility: HOSPITAL | Age: 75
End: 2025-03-27
Attending: PHYSICAL MEDICINE & REHABILITATION
Payer: MEDICARE

## 2025-03-27 PROCEDURE — 97110 THERAPEUTIC EXERCISES: CPT | Performed by: PHYSICAL THERAPIST

## 2025-03-27 PROCEDURE — 97530 THERAPEUTIC ACTIVITIES: CPT | Performed by: PHYSICAL THERAPIST

## 2025-03-27 NOTE — PROGRESS NOTES
Patient: Ben Key (74 year old, male) Referring Provider:  Insurance:   Diagnosis: Lumbar disc herniation with radiculopathy (M51.16) [facility]   Date of Surgery: L4-5 laminectomy, microdisctomy in 2021 Next MD visit:  N/A   Precautions:  Fall Risk; Other (use comment) (high fall risk with R foot drop, previous lumbar spine surgery) 4-5 weeks Referral Information:    Date of Evaluation: Req: 0, Auth: 0, Exp:     02/06/25 POC Auth Visits:  10       Today's Date   3/27/2025    Subjective  Reports his leg is feeling a little better.  States the pain is less but he still has burning pain in the R LE.  States he also gets cramping in his hands.  States he is using the RW with the tennis balls on the back posts but states he doens't use the RW when he goes to the bathroom at night.       Pain: 2/10     Objective          Patient leans R in sitting and standing   Assessment  No adverse effects to treatment.  The pt was able to follow directions more this date.  But his R LE fatiqued with standing exercises.  Encouarged use of a the RW at all times.  Note the pt c/o cramping in the hands - advised to inform the doctor at his follow up visit and tape wash clothes around the handles of he RW.    Goals (to be met in 10 visits)   Therapy Goals         The pt will be able to walk 2-3 city blocks without an increase in symptoms.   The pt will be independent in his HEP   Centralization of symptoms to the lumbar spine.   The pt will report a 25% decrease in pain.   The pt will be able to sleep through the night without waking up from pain.               Plan  Continue PT  - continue fucnctional strrengthing and safety training    Treatment Last 4 Visits  Treatment Day: 4       3/20/2025 3/27/2025   Spine Treatment   Therapeutic Exercise - LAQ  - Marching in sitting  - DF in sitting  - ball squeeze in sitting  - marching in standing     Standing  - CR's  - side stepping  - marching  - alternating hip  extension    Sitting  - LAQ with ball holds  - adductor squeeze with exhalation   Therapeutic Activity - sit to stand from chair  - side stepping at the bar  - walker height adjustment/tennis balls placed       - sit to stand from chair  - use of wash clothes around the handles of the RW  - advised to use the RW at all times, especially at night when he is going to the bathroom     Therapeutic Exercise Minutes 30 30   Therapeutic Activity Minutes 15 10   Total Time Of Timed Procedures 45 40   Total Time Of Service-Based Procedures 0 0   Total Treatment Time 45 40   HEP - ball squeeze in sitting  - marching in sitting  - DF in sitting Sitting  - LAQ with ball holds  - adductor squeeze with exhalation        HEP  Sitting  - LAQ with ball holds  - adductor squeeze with exhalation    Charges  TE2, TA1

## 2025-03-28 ENCOUNTER — HOSPITAL ENCOUNTER (EMERGENCY)
Facility: HOSPITAL | Age: 75
Discharge: NURSING, REHAB OR LONG-TERM CARE | End: 2025-03-29
Attending: STUDENT IN AN ORGANIZED HEALTH CARE EDUCATION/TRAINING PROGRAM
Payer: MEDICARE

## 2025-03-28 ENCOUNTER — TELEPHONE (OUTPATIENT)
Dept: NEUROLOGY | Facility: CLINIC | Age: 75
End: 2025-03-28

## 2025-03-28 DIAGNOSIS — R45.851 SUICIDAL IDEATION: ICD-10-CM

## 2025-03-28 DIAGNOSIS — F32.A DEPRESSION, UNSPECIFIED DEPRESSION TYPE: Primary | ICD-10-CM

## 2025-03-28 LAB
AMPHET UR QL SCN: NEGATIVE
ANION GAP SERPL CALC-SCNC: 7 MMOL/L (ref 0–18)
APAP SERPL-MCNC: 3.4 UG/ML (ref 10–20)
BARBITURATES UR QL SCN: NEGATIVE
BASOPHILS # BLD AUTO: 0.03 X10(3) UL (ref 0–0.2)
BASOPHILS NFR BLD AUTO: 0.6 %
BENZODIAZ UR QL SCN: NEGATIVE
BUN BLD-MCNC: 24 MG/DL (ref 9–23)
BUN/CREAT SERPL: 17.4 (ref 10–20)
CALCIUM BLD-MCNC: 9.4 MG/DL (ref 8.7–10.4)
CANNABINOIDS UR QL SCN: NEGATIVE
CHLORIDE SERPL-SCNC: 106 MMOL/L (ref 98–112)
CO2 SERPL-SCNC: 27 MMOL/L (ref 21–32)
COCAINE UR QL: NEGATIVE
CREAT BLD-MCNC: 1.38 MG/DL
CREAT UR-SCNC: 70.6 MG/DL
DEPRECATED RDW RBC AUTO: 45.1 FL (ref 35.1–46.3)
EGFRCR SERPLBLD CKD-EPI 2021: 53 ML/MIN/1.73M2 (ref 60–?)
EOSINOPHIL # BLD AUTO: 0.1 X10(3) UL (ref 0–0.7)
EOSINOPHIL NFR BLD AUTO: 1.9 %
ERYTHROCYTE [DISTWIDTH] IN BLOOD BY AUTOMATED COUNT: 12.8 % (ref 11–15)
ETHANOL SERPL-MCNC: <3 MG/DL (ref ?–3)
FENTANYL UR QL SCN: NEGATIVE
GLUCOSE BLD-MCNC: 97 MG/DL (ref 70–99)
HCT VFR BLD AUTO: 38.1 %
HGB BLD-MCNC: 12.9 G/DL
IMM GRANULOCYTES # BLD AUTO: 0.02 X10(3) UL (ref 0–1)
IMM GRANULOCYTES NFR BLD: 0.4 %
LYMPHOCYTES # BLD AUTO: 1.2 X10(3) UL (ref 1–4)
LYMPHOCYTES NFR BLD AUTO: 23 %
MCH RBC QN AUTO: 32.3 PG (ref 26–34)
MCHC RBC AUTO-ENTMCNC: 33.9 G/DL (ref 31–37)
MCV RBC AUTO: 95.5 FL
MDMA UR QL SCN: NEGATIVE
METHADONE UR QL SCN: NEGATIVE
MONOCYTES # BLD AUTO: 0.59 X10(3) UL (ref 0.1–1)
MONOCYTES NFR BLD AUTO: 11.3 %
NEUTROPHILS # BLD AUTO: 3.27 X10 (3) UL (ref 1.5–7.7)
NEUTROPHILS # BLD AUTO: 3.27 X10(3) UL (ref 1.5–7.7)
NEUTROPHILS NFR BLD AUTO: 62.8 %
OPIATES UR QL SCN: NEGATIVE
OSMOLALITY SERPL CALC.SUM OF ELEC: 294 MOSM/KG (ref 275–295)
OXYCODONE UR QL SCN: NEGATIVE
PCP UR QL SCN: NEGATIVE
PLATELET # BLD AUTO: 260 10(3)UL (ref 150–450)
POTASSIUM SERPL-SCNC: 4.1 MMOL/L (ref 3.5–5.1)
RBC # BLD AUTO: 3.99 X10(6)UL
SALICYLATES SERPL-MCNC: <3 MG/DL (ref 3–20)
SARS-COV-2 RNA RESP QL NAA+PROBE: NOT DETECTED
SODIUM SERPL-SCNC: 140 MMOL/L (ref 136–145)
WBC # BLD AUTO: 5.2 X10(3) UL (ref 4–11)

## 2025-03-28 RX ORDER — GABAPENTIN 100 MG/1
200 CAPSULE ORAL NIGHTLY
Status: DISCONTINUED | OUTPATIENT
Start: 2025-03-28 | End: 2025-03-29

## 2025-03-28 RX ORDER — DONEPEZIL HYDROCHLORIDE 5 MG/1
5 TABLET, FILM COATED ORAL NIGHTLY
Status: DISCONTINUED | OUTPATIENT
Start: 2025-03-28 | End: 2025-03-29

## 2025-03-28 RX ORDER — MIDODRINE HYDROCHLORIDE 2.5 MG/1
2.5 TABLET ORAL 3 TIMES DAILY
Status: DISCONTINUED | OUTPATIENT
Start: 2025-03-29 | End: 2025-03-29

## 2025-03-28 NOTE — TELEPHONE ENCOUNTER
Patients wife paged about patient worsening depression and developing suicidal ideation. She was not sure if he had a plan. Advised to come to ED

## 2025-03-28 NOTE — ED INITIAL ASSESSMENT (HPI)
Patient coming into the ER for SI. Patient states that he has been having SI for the past couple months with \"everything blowing up today\". Patient states \"I tried today\". When asked what he tried he states \"I tried to hang myself but I stopped\". No prior hx.

## 2025-03-29 VITALS
DIASTOLIC BLOOD PRESSURE: 80 MMHG | OXYGEN SATURATION: 98 % | SYSTOLIC BLOOD PRESSURE: 116 MMHG | HEART RATE: 81 BPM | TEMPERATURE: 98 F | RESPIRATION RATE: 20 BRPM

## 2025-03-29 PROBLEM — F33.2 SEVERE EPISODE OF RECURRENT MAJOR DEPRESSIVE DISORDER, WITHOUT PSYCHOTIC FEATURES (HCC): Status: ACTIVE | Noted: 2025-03-29

## 2025-03-29 PROBLEM — R45.851 SUICIDAL IDEATION: Status: ACTIVE | Noted: 2025-01-01

## 2025-03-29 PROBLEM — R45.851 SUICIDAL IDEATION: Status: ACTIVE | Noted: 2025-03-29

## 2025-03-29 PROBLEM — F33.2 SEVERE EPISODE OF RECURRENT MAJOR DEPRESSIVE DISORDER, WITHOUT PSYCHOTIC FEATURES (HCC): Status: ACTIVE | Noted: 2025-01-01

## 2025-03-29 PROCEDURE — 90792 PSYCH DIAG EVAL W/MED SRVCS: CPT | Performed by: OTHER

## 2025-03-29 RX ORDER — ARIPIPRAZOLE 2 MG/1
1 TABLET ORAL DAILY
Status: DISCONTINUED | OUTPATIENT
Start: 2025-03-29 | End: 2025-03-29

## 2025-03-29 RX ORDER — VENLAFAXINE HYDROCHLORIDE 37.5 MG/1
37.5 CAPSULE, EXTENDED RELEASE ORAL
Status: DISCONTINUED | OUTPATIENT
Start: 2025-03-30 | End: 2025-03-29

## 2025-03-29 NOTE — ED QUICK NOTES
[facility] to [facility]  Patient accepted to Dr. Hoang  Will return call when transportation is able to be arranged

## 2025-03-29 NOTE — ED NOTES
This writer presented to the bedside of patient to introduce self and purpose of Level of Care assessment. Patient wife and son were both in the room.     Per patient, \" Opal been dealing with a lot with my health lately. Last couple of weeks - things have been changing for me, impacted me- i loss my mind. I been able to sleep in days. I told my wife today about me trying to harm myself. She is my backbone. I don't like having difficulty trying to say things.\"      Ирина Key   Wife (POA)  [phone]     Per wife, \" He has been having dealing with chronic back pain for the last 6 months. He is not able to do the things he use to do, which has made him depressed. He can't drive anymore. He told me to sit down, and how he needed to tell me something. He had a plan to commit suicide. He had already gathered some rope from the balcony and placed them in the night stand. I called the doctor, who advised us to come to ER. He had thoughts before without a plan, no outpatient treatment history.\"    This writer reviewed with ER provider about patient feeling suicidal with plan to harm self.    This writer and patient reviewed Voluntary Admission: I have reviewed the Rights of Voluntary Admittee and the Rights of Individuals Receiving Mental Health and Developmental Disability Services with the patient. I have given a copy of these rights and a copy of the Application For Voluntary Admission to the patient.

## 2025-03-29 NOTE — CONSULTS
[facility]  part of [facility]    Report of Consultation    Ben Key Patient Status:  Emergency    12/15/1950 MRN [id number]   Location [facility] EMERGENCY DEPARTMENT Attending Cornelius Hill MD   Hosp Day # 0 PCP Gabriel Rodríguez MD     Date of Admission:  3/28/2025  Date of Consult:  2025   Reason for Consultation:   Patient presented with suicidal ideation and a plan, Cornelius Hernandes MD requested psychiatric consult for evaluation and advice.    Consult Duration     The patient seen for initial psychiatric consult evaluation.   Record reviewed, communication with attending, communication with RN and patient seen face to face evaluation.    History of Present Illness:   Patient is a 74 year old   male with history of orthostatic hypotension, anxiety, depression, BPH and hearing impairment who presented to the hospital reporting suicidal ideation with a plan.  Patient admitted to the ED and psych consult requested for evaluation and advice.      Reviewing the chart indicated that patient presents to [facility] by car accompanied with wife and son. Patient informed wife feeling suicidal with plan to hang self. Patient showed wife rope gathered to complete act. Patient has no history of behavioral health treatment. Patient presents depressed mood, difficulty concentrating, sleeping and feeling overwhelmed.     Lab indicated slight dehydration otherwise vitamin B12 is 1118, TSH is 2.34 in January and current hemoglobin 12.9.  Last brain CT scan done in  demonstrating some vascular ischemic changes.      The patient seen today in the presence of his wife by his permission.  The patient calm and cooperative laying down in his bed demonstrating slight psychomotor slowness otherwise cognitively reasonably and reliable in his presentation.    The patient reporting that he worked hard in his life to enjoy shelter but then started having  multiple medical condition and back surgery with difficulty walking and that impaired his physical functionality subsequently his emotional wellbeing.  Patient reported that he took antidepressant medication for 3 years in the past but not on any antidepressants recently.    Patient can admitted that he has been depressed for a while since he started having physical limitation and impairment in his functionality.  Patient reporting today that he has difficulty even and minimum self-care where he cannot utilize toileting on his own but even urination or using the washroom has becoming a challenge and he need to have help.    Patient admitted increased sense of hopelessness, helplessness, worthlessness and sense of guilt.  Patient has lost interest in activity for long time and has been isolated socially with lack of energy, lack of motivation and decrease in his concentration.    Patient admitted that he has been entertaining the thought of dying for while and recently come to conclusion that he is a burden on the family and they should be better without him.  Patient has been formulating a suicidal plan and has been collecting ropes from the balcony to hang himself.    The patient today admitted serious intention and plan.  Patient otherwise after he shared his thought with his wife who inform the family he was surprised that no one wanted him to die.  Patient has been demonstrating intense worthlessness and severe melancholic depression.  Patient has been demonstrating high risk indicating the need for inpatient admission for safety and stabilization.    Otherwise the patient has been reporting that he has also lost control over his body with the frequent dizziness and he is unable to drive.  Patient reporting increased racing thoughts, rumination and difficulty sleeping.  Patient otherwise denying any panic attack.     The patient is not demonstrating any floresita or psychosis  The patient denies auditory or visual  hallucinations  The patient admitted that his concentration has been declining and the patient has been given donepezil by his PCP.    Collateral information collected by ER crisis team,  Ирина Key   Wife (POA)  [phone]     Per wife, \" He has been having dealing with chronic back pain for the last 6 months. He is not able to do the things he use to do, which has made him depressed. He can't drive anymore. He told me to sit down, and how he needed to tell me something. He had a plan to commit suicide. He had already gathered some rope from the balcony and placed them in the night stand. I called the doctor, who advised us to come to ER. He had thoughts before without a plan, no outpatient treatment history.\"    The patient has been demonstrating feelings of hopelessness, helplessness, worthlessness, low mood, low energy, decreased motivation with increased anxiety, worry, ruminations with impairment in sleep. The patient denies any suicidal ideations. The patient is agreeable to medications changes and to follow up with outpatient psychiatry providers.     Past Psychiatric/Medication History:  1. Prior diagnoses: Depression  2. Past psychiatric inpatient: None  3. Past outpatient history: PCP  4. Past suicide history: Never.   5. Medication history: None    Social History:   Patient is  lives with his wife.  Patient is retired engineering.  Patient denied history of alcohol or substance abuse.  Patient reporting that he has 2 adult children and they are very supportive.  Patient have good relationship with his wife    Family History:  Deny any psychiatric family history  Medical History:   Past Medical History  Past Medical History:    Allergic rhinitis    Hayfever    Anxiety    Arthritis    Work related    Back problem    BPH (benign prostatic hyperplasia)    Depression    Eczema    Hearing impairment    bilateral    Hypotension    orthostatic    Hypotension    Lumbar disc herniation with  radiculopathy    Pyogenic granuloma of skin    Left thumb pyogenic granuloma    Right leg weakness    Visual impairment    glasses       Past Surgical History  Past Surgical History:   Procedure Laterality Date    Adj tiss xfer head,fac,hand <10sqcm Left 2024    Wide excision pyogenic granuloma L thumb,flap reconstruction    Back surgery  2/15/2021    Clean out disc    Cataract      Cataract extraction w/  intraocular lens implant Left 2021    PC IOL topical IV sedation- Dr. Barriga @ [facility]    Cataract extraction w/  intraocular lens implant Right 10/19/2021    Dr. Barriga    Cataract extraction w/ intraocular lens implant Left 2021    Dr. Barriga     Cholecystectomy      Colonoscopy      Colonoscopy      Colonoscopy N/A 2024    Procedure: COLONOSCOPY;  Surgeon: Khai Doty MD;  Location: [facility] ENDOSCOPY    Other  2021    left eye     Other  10/2021    right eye    Spine surgery procedure unlisted  02/15/2021    LEFT LUMBAR 4 -LUMBAR 5 MICRODISCECTOMY per Dr. Hernandez    Tonsillectomy  As a child       Family History  Family History   Problem Relation Age of Onset    Other (Dementia) Mother         Late 70s    Glaucoma Mother     Other (partial colectomy) Mother     Dementia Mother         In later years    Depression Mother     Anxiety Mother     Heart Disorder Father         Bypass surgery    Heart Attack Father     Heart Disease Father     Macular degeneration Neg     Diabetes Neg        Social History  Social History     Socioeconomic History    Marital status:    Tobacco Use    Smoking status: Former     Current packs/day: 0.00     Average packs/day: 2.0 packs/day for 30.0 years (60.0 ttl pk-yrs)     Types: Cigarettes     Quit date: 1976     Years since quittin.0     Passive exposure: Past    Smokeless tobacco: Never    Tobacco comments:     Long time smoker, finally quit under doctor care   Vaping Use    Vaping status: Never Used   Substance and Sexual Activity     Alcohol use: Not Currently     Comment: Due to medications I have not drank for several years.    Drug use: Never   Other Topics Concern    Caffeine Concern Yes     Comment: soda 4 cans a day    Exercise No    Seat Belt No    Special Diet No    Stress Concern No    Weight Concern No    Pt has a pacemaker No    Pt has a defibrillator No    Reaction to local anesthetic No     Social Drivers of Health     Food Insecurity: Patient Declined (11/11/2024)    Received from [facility]    Hunger Vital Sign     Worried About Running Out of Food in the Last Year: Patient declined     Ran Out of Food in the Last Year: Patient declined   Transportation Needs: Patient Declined (11/11/2024)    Received from [facility]    PRAPARE - Transportation     Lack of Transportation (Medical): Patient declined     Lack of Transportation (Non-Medical): Patient declined   Housing Stability: Unknown (6/3/2022)    Received from [facility], [facility]    Housing Stability Vital Sign     Unable to Pay for Housing in the Last Year: No     Unstable Housing in the Last Year: No           Current Medications:  Current Facility-Administered Medications   Medication Dose Route Frequency    midodrine (ProAmatine) tab 2.5 mg  2.5 mg Oral TID    donepezil (Aricept) tab 5 mg  5 mg Oral Nightly    gabapentin (Neurontin) cap 200 mg  200 mg Oral Nightly    melatonin cap/tab 5 mg  5 mg Oral Nightly     (Not in a hospital admission)      Allergies  Allergies[1]    Review of Systems:   As by Admitting/Attending    Results:   Laboratory Data:  Lab Results   Component Value Date    WBC 5.2 03/28/2025    HGB 12.9 (L) 03/28/2025    HCT 38.1 (L) 03/28/2025    .0 03/28/2025    CREATSERUM 1.38 (H) 03/28/2025    BUN 24 (H) 03/28/2025     03/28/2025    K 4.1 03/28/2025     03/28/2025    CO2 27.0 03/28/2025    GLU 97 03/28/2025    CA 9.4 03/28/2025    ALB 4.3  03/07/2025    ALB 3.93 03/07/2025    ALKPHO 87 03/07/2025    TP 7.2 03/07/2025    TP 6.9 03/07/2025    AST 27 03/07/2025    ALT 21 03/07/2025    PTT 29.7 02/03/2021    INR 0.91 02/03/2021    PTP 12.1 02/03/2021    T4F 1.0 07/16/2023    TSH 2.349 01/28/2025    PSA 0.9 03/15/2018    MG 2.0 01/28/2025     (H) 07/16/2023    B12 1,118 (H) 01/28/2025    ETOH <3 03/28/2025         Imaging:  No results found.    Vital Signs:   Blood pressure 112/61, pulse 72, temperature 98.1 °F (36.7 °C), temperature source Temporal, resp. rate 18, SpO2 97%.    Mental Status Exam:   Appearance: Stated age male, in hospital gown, laying down in hospital bed.  Psychomotor: No psychomotor agitation, or retardation.  Slight psychomotor slowness  Orientation: Alert and oriented to person, place, time and condition.  Gait: Not evaluated.  Patient complaining of physical impairment  Attitude/Coorperation: Cooperative and attentive.  Patient was reliable  Behavior: Appropriate.  Speech: Regular rate and rhythm speech.  Slight soft low-tone speech  Mood: Patient reporting depressed and hopeless mood.  Affect: Dysphoric affect, congruent with the mood.  Thought process: Linear and appropriate.  Thought content: Patient admitted having suicidal ideation with intention and plan.  Perceptions: Patient denies any auditory or visual hallucinations.  Concentration: Grossly reasonable  Memory: Grossly reasonable  Intellect: Average.  Judgment and Insight: Questionable.  Patient had severe depressive episode has been clouding his thoughts and causing suicidal ideation    Impression:     Suicidal ideation.  Major depressive disorder, recurrent severe without psychotic feature    The patient is a 74-year-old   male with a chronic medical history and chronic back pain with recurrent dizziness and history of depression in the past who has been demonstrating exacerbation of depression presented by increased hopelessness, helplessness,  worthlessness and guilt.    Patient today admitted that he has serious and determine suicidal plan resulted from his depression and hopelessness with intention to hang himself.    Discussed risk and benefit, acknowledging the current symptom and severity.  At this point, I would recommend the following approach:     Focus on safety.  Continue inpatient psych admission process.  Patient signed voluntarily.  Focus on education and support.  Focus on insight orientation helping the patient understand diagnosis and treatment plan.  Start Effexor XR 37.5 mg daily.  Start Abilify 1 mg daily.  Continue his medication of donepezil 5 mg daily.  Processed with patient and wife at length, the initiation of the above psychotropic medications I advised the patient of the risks, benefits, alternatives and potential side effects. The patient consents to administration of the medications and understands the right to refuse medications at any time. The patient verbalized understanding.   Coordinate plan with team    Orders This Visit:  Orders Placed This Encounter   Procedures    CBC With Differential With Platelet    Basic Metabolic Panel (8)    Ethyl Alcohol    Acetaminophen (Tylenol), S    Drug Abuse Panel 11 screen    Salicylate, Serum    SARS-CoV-2 by PCR ([employer])       Meds This Visit:  Requested Prescriptions      No prescriptions requested or ordered in this encounter       Drake Morris MD  3/29/2025    Note to Patient: The 21st Century Cures Act makes medical notes like these available to patients in the interest of transparency. However, be advised this is a medical document. It is intended as peer to peer communication. It is written in medical language and may contain abbreviations or verbiage that are unfamiliar. It may appear blunt or direct. Medical documents are intended to carry relevant information, facts as evident, and the clinical opinion of the practitioner. This note may have been transcribed using a  voice dictation system. Voice recognition errors may occur. This should not be taken to alter the content or meaning of this note.            [1]   Allergies  Allergen Reactions    Dupilumab SWELLING and Tightness in Throat    Mildew OTHER (SEE COMMENTS)     Sneezing    Mold OTHER (SEE COMMENTS)     Sneezing

## 2025-03-29 NOTE — ED PROVIDER NOTES
[facility] Emergency Department Note  Patient: Ben Key Age: 74 year old Sex: male      MRN: [id number]  : 12/15/1950    Patient Seen in: [facility] Emergency Department    History     Chief Complaint   Patient presents with    Eval-P     Stated Complaint: Eval-p, SI    History obtained from: Patient    74-year-old male with a past medical history of orthostatic hypotension, anxiety, depression, BPH presenting today for psychiatric evaluation.  Patient states that he has been having worsening depression symptoms over the past 6 months.  States that he has been having suicidal ideations.  He states that today, he plan to hang himself in an attempt to end his life.  He states that he told his wife about his symptoms and his wife brought him to the emergency department today.  He states that his depression symptoms are largely based on the fact that he has been having slowly declining health as he ages and he feels if he is losing his function.    Review of Systems:  Review of Systems  Positive for stated complaint: Eval-p, SI. Constitutional and vital signs reviewed. All other systems reviewed and negative except as noted above.    Patient History:  Past Medical History:    Allergic rhinitis    Hayfever    Anxiety    Arthritis    Work related    Back problem    BPH (benign prostatic hyperplasia)    Depression    Eczema    Hearing impairment    bilateral    Hypotension    orthostatic    Hypotension    Lumbar disc herniation with radiculopathy    Pyogenic granuloma of skin    Left thumb pyogenic granuloma    Right leg weakness    Visual impairment    glasses       Past Surgical History:   Procedure Laterality Date    Adj tiss xfer head,fac,hand <10sqcm Left 2024    Wide excision pyogenic granuloma L thumb,flap reconstruction    Back surgery  2/15/2021    Clean out disc    Cataract      Cataract extraction w/  intraocular lens implant Left 2021    PC IOL topical IV sedation- Dr. Barriga @  [facility]    Cataract extraction w/  intraocular lens implant Right 10/19/2021    Dr. Barriga    Cataract extraction w/ intraocular lens implant Left 2021    Dr. Barriga     Cholecystectomy      Colonoscopy      Colonoscopy      Colonoscopy N/A 2024    Procedure: COLONOSCOPY;  Surgeon: Khai Doty MD;  Location: [facility] ENDOSCOPY    Other  2021    left eye     Other  10/2021    right eye    Spine surgery procedure unlisted  02/15/2021    LEFT LUMBAR 4 -LUMBAR 5 MICRODISCECTOMY per Dr. Hernandez    Tonsillectomy  As a child        Family History   Problem Relation Age of Onset    Other (Dementia) Mother         Late 70s    Glaucoma Mother     Other (partial colectomy) Mother     Dementia Mother         In later years    Depression Mother     Anxiety Mother     Heart Disorder Father         Bypass surgery    Heart Attack Father     Heart Disease Father     Macular degeneration Neg     Diabetes Neg        Specific Social Determinants of Health:   Social History     Socioeconomic History    Marital status:    Tobacco Use    Smoking status: Former     Current packs/day: 0.00     Average packs/day: 2.0 packs/day for 30.0 years (60.0 ttl pk-yrs)     Types: Cigarettes     Quit date: 1976     Years since quittin.0     Passive exposure: Past    Smokeless tobacco: Never    Tobacco comments:     Long time smoker, finally quit under doctor care   Vaping Use    Vaping status: Never Used   Substance and Sexual Activity    Alcohol use: Not Currently     Comment: Due to medications I have not drank for several years.    Drug use: Never   Other Topics Concern    Caffeine Concern Yes     Comment: soda 4 cans a day    Exercise No    Seat Belt No    Special Diet No    Stress Concern No    Weight Concern No    Pt has a pacemaker No    Pt has a defibrillator No    Reaction to local anesthetic No     Social Drivers of Health     Food Insecurity: Patient Declined (2024)    Received from [facility]  [facility]    Hunger Vital Sign     Worried About Running Out of Food in the Last Year: Patient declined     Ran Out of Food in the Last Year: Patient declined   Transportation Needs: Patient Declined (11/11/2024)    Received from [facility]    PRAPARE - Transportation     Lack of Transportation (Medical): Patient declined     Lack of Transportation (Non-Medical): Patient declined   Housing Stability: Unknown (6/3/2022)    Received from [facility], [facility]    Housing Stability Vital Sign     Unable to Pay for Housing in the Last Year: No     Unstable Housing in the Last Year: No           PSFH elements reviewed from today and agreed except as otherwise stated in HPI.    Physical Exam     ED Triage Vitals   BP 03/28/25 2122 (!) 169/80   Pulse 03/28/25 1825 60   Resp 03/28/25 1825 16   Temp 03/28/25 2122 98.1 °F (36.7 °C)   Temp src 03/28/25 2122 Temporal   SpO2 03/28/25 2122 97 %   O2 Device 03/28/25 2122 None (Room air)       Current:BP (!) 169/80   Pulse 58   Temp 98.1 °F (36.7 °C) (Temporal)   Resp 16   SpO2 97%         Physical Exam  Constitutional:       Appearance: He is well-developed.   HENT:      Head: Normocephalic and atraumatic.      Right Ear: External ear normal.      Left Ear: External ear normal.      Nose: Nose normal.   Eyes:      Conjunctiva/sclera: Conjunctivae normal.      Pupils: Pupils are equal, round, and reactive to light.   Cardiovascular:      Rate and Rhythm: Normal rate and regular rhythm.      Heart sounds: Normal heart sounds.   Pulmonary:      Effort: Pulmonary effort is normal.      Breath sounds: Normal breath sounds.   Abdominal:      General: Bowel sounds are normal.      Palpations: Abdomen is soft.      Tenderness: There is no abdominal tenderness.   Musculoskeletal:         General: Normal range of motion.      Cervical back: Normal range of motion and neck supple.   Skin:     General: Skin is warm and  dry.      Findings: No rash.   Neurological:      General: No focal deficit present.      Mental Status: He is alert and oriented to person, place, and time.      Deep Tendon Reflexes: Reflexes are normal and symmetric.   Psychiatric:         Mood and Affect: Mood normal.         Behavior: Behavior normal.         ED Course   Labs:   Labs Reviewed   CBC WITH DIFFERENTIAL WITH PLATELET - Abnormal; Notable for the following components:       Result Value    HGB 12.9 (*)     HCT 38.1 (*)     All other components within normal limits   BASIC METABOLIC PANEL (8) - Abnormal; Notable for the following components:    BUN 24 (*)     Creatinine 1.38 (*)     eGFR-Cr 53 (*)     All other components within normal limits   ACETAMINOPHEN (TYLENOL), S - Abnormal; Notable for the following components:    Acetaminophen 3.4 (*)     All other components within normal limits   SALICYLATE, SERUM - Abnormal; Notable for the following components:    Salicylate <3.0 (*)     All other components within normal limits   ETHYL ALCOHOL - Normal   SARS-COV-2 BY PCR (GENEXPERT) - Normal   DRUG ABUSE PANEL 11 SCREEN     Radiology findings:  I personally reviewed the images.   No results found.    EKG as interpreted by me: Ventricular rate 60, normal sinus rhythm, normal axis, no IN interval prolongation, narrow QRS, QTc 414 ms, no ST segment elevations or depressions, no abnormal T wave inversions  Cardiac Monitor: Interpreted by me.   Pulse Readings from Last 1 Encounters:   03/28/25 58   , sinus,     External non-ED records reviewed independently by me: Baseline creatinine of 1.32 based on labs from 3 weeks ago    MDM   74-year-old male with a past medical history of orthostatic hypotension, anxiety, depression, BPH presented for evaluation of suicidal ideations with plans to hang himself.  Upon arrival emergency department, he has no acute physical complaints.    Differential diagnoses considered includes, but is not limited to: Mood disorder,  depression, suicidal ideation    Will obtain the following tests: Medical clearance labs, EKG  Please see ED course for my independent review of these tests/imaging results.    Initial Medications/Therapeutics administered: Will provide home nightly medications    Chronic conditions affecting care: Orthostatic hypotension, anxiety, depression, BPH      ED Course as of 03/28/25 2145  ------------------------------------------------------------  Time: 03/28 2142  Comment: Labs reassuring.  Patient is medically cleared for transfer to inpatient psychiatric facility.  He was evaluated by ED psych liaison who agrees with plan for inpatient hospitalization for stabilization of his suicidal ideations with a plan.  Petition and certification completed.            Disposition and Plan     Clinical Impression:  1. Depression, unspecified depression type    2. Suicidal ideation        Disposition:  Psychiatric transfer    Follow-up:  No follow-up provider specified.    Medications Prescribed:  Current Discharge Medication List            This note may have been created using voice dictation technology and may include inadvertent errors.      Karen Sequeira MD  Emergency Medicine

## 2025-03-29 NOTE — ED QUICK NOTES
Rounding Completed    Plan of Care reviewed. Waiting for Psych placement.  Elimination needs assessed.  Provided Update and elimination needs. No other needs at this time. Room Safety check done. Sitter at bedside with 15 min monitoring.      Bed is locked and in lowest position. Call light within reach.

## 2025-03-29 NOTE — ED QUICK NOTES
Patient went to Rm 24, handoff report given to Spencer WILLS. Patient resting comfortably in the cart, no needs at the time of transport to new room.
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
## 2025-03-29 NOTE — ED QUICK NOTES
Rounding Completed    Plan of Care reviewed. Waiting for psychiatric evaluation.  Elimination needs assessed.  Provided food.    Bed is locked and in lowest position. Call light within reach.

## 2025-03-29 NOTE — ED QUICK NOTES
Patient resting on cart  Sitter remains at bedside for 1:1 observation  Room secured for safety  Plan of care reviewed  Awaiting placement

## 2025-03-29 NOTE — ED QUICK NOTES
Rounding Completed     Plan of Care reviewed. Waiting for placement.  Elimination needs assessed.     Pt sleeping     Bed is locked and in lowest position. Call light within reach.     Sitter at the door

## 2025-03-30 LAB
ATRIAL RATE: 60 BPM
P AXIS: 32 DEGREES
P-R INTERVAL: 166 MS
Q-T INTERVAL: 414 MS
QRS DURATION: 84 MS
QTC CALCULATION (BEZET): 414 MS
R AXIS: 32 DEGREES
T AXIS: 39 DEGREES
VENTRICULAR RATE: 60 BPM

## 2025-03-31 ENCOUNTER — PATIENT MESSAGE (OUTPATIENT)
Dept: INTERNAL MEDICINE CLINIC | Facility: CLINIC | Age: 75
End: 2025-03-31

## 2025-03-31 ENCOUNTER — PATIENT MESSAGE (OUTPATIENT)
Dept: NEUROLOGY | Facility: CLINIC | Age: 75
End: 2025-03-31

## 2025-03-31 NOTE — TELEPHONE ENCOUNTER
PHUC to Dr. DERRELL Scott seen at [facility] for SI and transferred to inpatient psych

## 2025-04-01 ENCOUNTER — APPOINTMENT (OUTPATIENT)
Dept: PHYSICAL THERAPY | Facility: HOSPITAL | Age: 75
End: 2025-04-01
Attending: PHYSICAL MEDICINE & REHABILITATION
Payer: MEDICARE

## 2025-04-06 ENCOUNTER — PATIENT MESSAGE (OUTPATIENT)
Dept: NEUROLOGY | Facility: CLINIC | Age: 75
End: 2025-04-06

## 2025-04-06 DIAGNOSIS — R41.9 NEUROCOGNITIVE DISORDER: Primary | ICD-10-CM

## 2025-04-08 ENCOUNTER — APPOINTMENT (OUTPATIENT)
Dept: PHYSICAL THERAPY | Facility: HOSPITAL | Age: 75
End: 2025-04-08
Attending: PHYSICAL MEDICINE & REHABILITATION
Payer: MEDICARE

## 2025-04-08 ENCOUNTER — PATIENT MESSAGE (OUTPATIENT)
Dept: INTERNAL MEDICINE CLINIC | Facility: CLINIC | Age: 75
End: 2025-04-08

## 2025-04-08 ENCOUNTER — TELEPHONE (OUTPATIENT)
Dept: INTERNAL MEDICINE CLINIC | Facility: CLINIC | Age: 75
End: 2025-04-08

## 2025-04-08 NOTE — TELEPHONE ENCOUNTER
Patient's wife Ирина is calling to schedule a Hospital Follow Up appointment.  Patient was released from the hospital on 4/4, there are no openings, can patient be added to the schedule?    Also, patient has not had a bowel movement for 7 days, is there a recommendation for an OTC medication he can take?    Please call [phone]

## 2025-04-09 ENCOUNTER — TELEPHONE (OUTPATIENT)
Dept: INTERNAL MEDICINE CLINIC | Facility: CLINIC | Age: 75
End: 2025-04-09

## 2025-04-09 NOTE — TELEPHONE ENCOUNTER
Called Ирина and set up hospital follow up on 4/25/25.  Ирина still looking for direction on the constipation.  She mentions that they have Mirilax and Milk of Mag in the house, but she is not sure she can give any of those based on all his other meds.

## 2025-04-09 NOTE — TELEPHONE ENCOUNTER
Please advise - called spouse per HIPAA who states patient did not have a BM for 4 days. They have Miralax and  Milk of Magnesia on hand and want to know which he can take with all his other medications - to    independent

## 2025-04-09 NOTE — TELEPHONE ENCOUNTER
Earliest appointment we can on the first Thursday 4/24 -12:30 PM or Friday 4/25 1:30 PM.  Please let me know what they select

## 2025-04-09 NOTE — TELEPHONE ENCOUNTER
Wife, Ирина requests call back with recommendation for patient's constipation   [phone]     Patient has been constipated for about 4 days  Not sure if it is due to medications or what the problem is     They have Miralax and Milk of magnesia on hand  What can he take along with his daily meds

## 2025-04-09 NOTE — TELEPHONE ENCOUNTER
Take Miralax 17g every day  Drink fluids.  Can given warm prune juice.  If still constipated, can take 30cc of milk of magnesia

## 2025-04-09 NOTE — TELEPHONE ENCOUNTER
- Aggressive bowel regimen:     1.  Conservative measures include staying well-hydrated, eating high-fiber foods including fruits/vegetables.  2.  Use of Colace  3.  Addition of a fiber supplement/stool softener.  We recommend over-the-counter MiraLAX or Metamucil 1 dose in the morning  4.  If no improvement, add Senokot 1 tablet in the morning  5.  If still no improvement, you may need to double the dose of MiraLAX/Metamucil.  One dose in the morning and 1 dose in the evening  6.  If still no improvement, Take 2 tablets in the morning of Senokot  7. If still no improvement, would benefit from 1 dose of milk of magnesia     -May need to continue to consider additional GI evaluation if we cannot control symptoms further      To the pool, lets proceed with flowsheet as above.  Right now we are on step 4 we can incorporate the Senokot with the stool softener such as MiraLAX/Metamucil.  Then proceed accordingly on doubling the dosage.  Will reevaluate when I see him in clinic

## 2025-04-10 RX ORDER — GABAPENTIN 100 MG/1
CAPSULE ORAL
Qty: 360 CAPSULE | Refills: 1 | Status: SHIPPED | OUTPATIENT
Start: 2025-04-10

## 2025-04-10 NOTE — TELEPHONE ENCOUNTER
Patient wife called to ask for a medication for her  to help him sleep at night; requesting a call back.

## 2025-04-10 NOTE — TELEPHONE ENCOUNTER
Noted message.  Dr. Martinez reached out and discontinued the memantine and donepezi 4/6.  Has appointment me 4/25

## 2025-04-10 NOTE — TELEPHONE ENCOUNTER
Called pt's spouse Ирина and informed of new Gabapentin orders as per Dr. Ziegler.  Also sending information via [employer] as requested. Answered Ирина's questions, she was able to verbalize and repeat medication instructions. Ирина agreed to contact office if there are questions/concerns.

## 2025-04-13 ENCOUNTER — APPOINTMENT (OUTPATIENT)
Dept: MRI IMAGING | Facility: HOSPITAL | Age: 75
End: 2025-04-13
Attending: EMERGENCY MEDICINE
Payer: MEDICARE

## 2025-04-13 PROCEDURE — 70551 MRI BRAIN STEM W/O DYE: CPT | Performed by: EMERGENCY MEDICINE

## 2025-04-14 ENCOUNTER — PATIENT MESSAGE (OUTPATIENT)
Dept: INTERNAL MEDICINE CLINIC | Facility: CLINIC | Age: 75
End: 2025-04-14

## 2025-04-14 ENCOUNTER — TELEPHONE (OUTPATIENT)
Age: 75
End: 2025-04-14

## 2025-04-15 ENCOUNTER — APPOINTMENT (OUTPATIENT)
Dept: PHYSICAL THERAPY | Facility: HOSPITAL | Age: 75
End: 2025-04-15
Attending: PHYSICAL MEDICINE & REHABILITATION
Payer: MEDICARE

## 2025-04-21 ENCOUNTER — PATIENT MESSAGE (OUTPATIENT)
Dept: INTERNAL MEDICINE CLINIC | Facility: CLINIC | Age: 75
End: 2025-04-21

## 2025-04-23 ENCOUNTER — TELEPHONE (OUTPATIENT)
Dept: INTERNAL MEDICINE CLINIC | Facility: CLINIC | Age: 75
End: 2025-04-23

## 2025-04-23 ENCOUNTER — TELEPHONE (OUTPATIENT)
Dept: PALLIATIVE CARE | Facility: HOSPITAL | Age: 75
End: 2025-04-23

## 2025-04-23 NOTE — TELEPHONE ENCOUNTER
I received a cold call from pt's dtr Mayra who talked with me about her father's current condition. She tells me he has mild cognitive impairment, but no other significant chronic health issues. She says he is currently at [facility] for psychosis/depression/suicidal ideation management and has had a significant functional/cognitive decline and has not been eating for 9 days. She says they want him to go to [facility] but family does not feel he will be able to progress and improve. I talked with her about the differences between community palliative care vs hospice care. She says family is leaning towards comfort route and says she also [facility]. I encouraged her to talk with SW at facility to help with planning and keep Dr. Rodríguez updated on what transpires. Provided emotional support to pt's dtr who appears to be coping adequately.    Rika Kahn, ANP-BC, ACHPN

## 2025-04-23 NOTE — TELEPHONE ENCOUNTER
Patient is currently admitted at   [facility]     Looking into hospice care for patient as he is not doing well     wife, Ирина, requests call back from Dr Rodríguez   to discuss [phone]     - aware  is not in the office today

## 2025-04-24 ENCOUNTER — PATIENT MESSAGE (OUTPATIENT)
Dept: INTERNAL MEDICINE CLINIC | Facility: CLINIC | Age: 75
End: 2025-04-24

## 2025-04-24 NOTE — TELEPHONE ENCOUNTER
Attempted to call, I did see the message from 4/24.  Will try to call again after seeing patients.

## 2025-04-25 NOTE — TELEPHONE ENCOUNTER
Mannie is still at [facility] - trying to decide the best plan for him. Hospice was being considered.    He was not able to be arousable for the last few days. He was more lucid. Had not eaten for days. Hard time swallowing. Mannie was more conversational and was able to talk to Ирина. He asked about the kids and was having conversation. He was able to recongize the president on TV. He was talking out of it.    Today, he had a scoop of mashed potatoes. Didn't want breakfast. Drinking water with straw.    He's not taking any of his medicine.    POA - wishes to have quality of life rather than quantity. There was no explanation of his altered mental status at this time.    From his prior hospitalization, he was at home for a few days before becoming agitated and even combatitive.    Waiting for psychiatry evaluation.        I sent a brief summary of our phone conversation as we need to ensure that hospice    As Mannie is more lucid today and is taking in some oral intake, he seems to have taken a turn for the better..  We discussed options:    Medical.  We need to find any further reversible, treatable causes.  Which we seem to have completed with the medical team.  For example, infectious causes such as urinary infection, pneumonia may have been ruled out.  I do not believe the blood clot while being treated with the blood thinner could be contributing to this presentation.  May need further reassessment  Psychiatric evaluation.  I question if there was some overmedication, medication side effects especially if he had not been eating or drinking leading to the presentation.  Another phenomenon that can rarely occur with patients with dementia is a phenomenon called catatonia.  We do need psychiatric evaluation to determine the cause for his sudden change in mental status and what appears to be sudden reversal of his presentation.    With psychiatry evaluation, if hospice is no longer being considered, we need to consider  geriatric psychiatry, inpatient psychiatry, or a long-term assisted facility with a memory care unit.  This would be closer to discharge planning with social work.    3.  Hospice is reserved for irreversible, terminal conditions.  Lack of response to medical treatments and therapy.  And overall clinical decline.  1 aspect of this was Ben's inability to eat or refusal of food and water when he was still altered.  However if he is more lucid, more responsive, we should hold on hospice as it may not be time for end of care until we have a clear picture from the medical and psychiatric side.  Definitely keep us updated on his clinical course.

## 2025-04-27 ENCOUNTER — PATIENT MESSAGE (OUTPATIENT)
Dept: INTERNAL MEDICINE CLINIC | Facility: CLINIC | Age: 75
End: 2025-04-27

## 2025-05-09 NOTE — TELEPHONE ENCOUNTER
Referral Information:    Order Date: Apr 8, 2025    Signed Referral Summay:    Epic Order #: [id number]    Referral Type: Psychiatry Referral - In Network    Dx: Neurocognitive disorder (R41.9)    Comments: Geriatric psychiatry  Number of Visits: 1    Sent pt echoecho message back to inform.

## (undated) DEVICE — KENDALL SCD EXPRESS SLEEVES, KNEE LENGTH, MEDIUM: Brand: KENDALL SCD

## (undated) DEVICE — CATH IV 14G X 5-1/4 ANGIO

## (undated) DEVICE — C-ARM: Brand: UNBRANDED

## (undated) DEVICE — ALCOHOL 70% 4 OZ

## (undated) DEVICE — DERMABOND LIQUID ADHESIVE

## (undated) DEVICE — GLOVE SUR 7 SENSICARE PI MIC PIP CRM PWD F

## (undated) DEVICE — Device: Brand: PLUMEPEN

## (undated) DEVICE — SOLUTION IRRIG 1000ML 0.9% NACL USP BTL

## (undated) DEVICE — SUTURE VICRYL 2-0 CT-2

## (undated) DEVICE — PACK CDS PLASTIC HAND

## (undated) DEVICE — GLOVE BIOGEL ORTHO SZ 8

## (undated) DEVICE — SUT VCRL 4-0 18IN ABSRB UD L13MM P-3 3/8

## (undated) DEVICE — MEDI-VAC NON-CONDUCTIVE SUCTION TUBING 6MM X 1.8M (6FT.) L: Brand: CARDINAL HEALTH

## (undated) DEVICE — CO2 CANNULA,SSOFT,ADLT,7O2,4CO2,FEMALE: Brand: MEDLINE

## (undated) DEVICE — LAMINECTOMY CDS: Brand: MEDLINE INDUSTRIES, INC.

## (undated) DEVICE — KIT ENDO ORCAPOD 160/180/190

## (undated) DEVICE — KIT CLEAN ENDOKIT 1.1OZ GOWNX2

## (undated) DEVICE — SUTURE VICRYL 3-0 RB-1

## (undated) DEVICE — Device: Brand: INTELLICART™

## (undated) DEVICE — LIGHT HANDLE

## (undated) DEVICE — SUTURE VICRYL 0 CT-1

## (undated) DEVICE — SOL  .9 1000ML BTL

## (undated) DEVICE — GOWN SURG AERO CHROME XXL

## (undated) DEVICE — DRAPE MICROSCOPE NEURO PENTERO

## (undated) DEVICE — LASSO POLYPECTOMY SNARE: Brand: LASSO

## (undated) DEVICE — DISPOSABLE TOURNIQUET CUFF DUAL BLADDER, DUAL PORT AND QUICK CONNECT CONNECTOR: Brand: COLOR CUFF

## (undated) DEVICE — MARKER SKIN PREP RESIST STRL

## (undated) DEVICE — DRAPE,LAPAROTOMY,PCH,STERILE: Brand: MEDLINE

## (undated) DEVICE — STERILE POLYISOPRENE POWDER-FREE SURGICAL GLOVES: Brand: PROTEXIS

## (undated) DEVICE — SYRINGE, LUER SLIP, STERILE, 60ML: Brand: MEDLINE

## (undated) NOTE — ED AVS SNAPSHOT
Katlin Simon   MRN: [id number]    Department:  [facility] Emergency Department   Date of Visit:  8/4/2017           Disclosure     Insurance plans vary and the physician(s) referred by the ER may not be covered by your plan.  Please contact you CARE PHYSICIAN AT ONCE OR RETURN IMMEDIATELY TO THE EMERGENCY DEPARTMENT. If you have been prescribed any medication(s), please fill your prescription right away and begin taking the medication(s) as directed.   If you believe that any of the medications

## (undated) NOTE — LETTER
Hospital Discharge Documentation  Please phone to schedule a hospital follow up appointment. From: [address] Hospitalist's Office  Phone: [phone]    Patient discharged time/date: 2023  4:24 PM  Patient discharge disposition:  Home or Self Care       Discharge Summary - D/C Summary        Discharge Summary signed by Beba Brooks MD at 2023 12:13 PM  Version 1 of 1      Author: Beba Brooks MD Service: Hospitalist Author Type: Physician    Filed: 2023 12:13 PM Date of Service: 2023 12:11 PM Status: Signed    : Beba Brooks MD (Physician)           [facility]    Discharge Summary    Tracy Lucero Patient Status:  Observation    12/15/1950 MRN [id number]   Location [facility] 4W/SW/SE Attending Beba Brooks MD   Hosp Day # 0 PCP Angie Yuen MD     Date of Admission: 7/15/2023      Date of Discharge: 23      Admitting Diagnosis: Weakness generalized [R53.1]  Febrile illness, acute [R50.9]    Hospital Discharge Diagnoses:  Weakness    Lace+ Score: 46  59-90 High Risk  29-58 Medium Risk  0-28   Low Risk. TCM Follow-Up Recommendation:  LACE 29-58:  Moderate Risk of readmission after discharge from the hospital.          Problem List: Patient Active Problem List:     Smoker     Psoriasis     Dyslipidemia     Ganglion cyst of wrist, left     Chronic left lumbar radiculopathy     HNP (herniated nucleus pulposus), lumbar     Poor posture     Sleep disturbance     S/P lumbar microdiscectomy     Age-related nuclear cataract of both eyes     Floaters in visual field, bilateral     Visual impairment     Right leg weakness     Lumbar disc herniation with radiculopathy     Eczema     BPH (benign prostatic hyperplasia)     Back problem     Pseudophakia of both eyes     Febrile illness, acute     Weakness generalized     Ataxia        Physical Exam:     Gen: No acute distress  Pulm: Lungs clear, normal respiratory effort  CV: Heart with regular rate and rhythm  Abd: Abdomen soft, nontender, nondistended, bowel sounds present  Neuro: No acute focal deficits  MSK: Full range of motion in extremities  Skin: Warm and dry  Psych: Normal affect  Ext: no c/c/e      History of Present Illness: Per Dr Bobbi Longoria is a 67year old male with hx. Of dementia, eczema, BPH who presents with profound weakness. He notes that he has had dizziness off and on but progressively worse over the past month. Yesterday he was walking and he fell when his shoe got caught on the carpet. Very hard time getting up he was able to get up with help from his wife and mated to bed. He slept but then when he woke up he was unable to get out of bed. Aside from dizziness he denies pain he denies dysuria he denies cough. He did note that his wife felt he was febrile on checking his forehead. The only other complaint he has is he has had a sore throat since yesterday. Hospital Course:     Fever  Weakness  -source unclear  -does have sore throat and erythema on exam  -checked rapid strep - negative  -started augmentin - changed to amoxicillin  -monitor fever curver  -follow-up blood cultures - ngtd  -cxr and ua unremarkable  -urine culture --> 10-50K staph aureus  -complete 7 days of amoxicillin  -pt/ot--> acute rehab denied - plan home with outpt pt  -up in chair tid with meals     Nonpharmacological treatment for orthostatic hypotension:  Liberalization of water intake, up to 2.5 L/D. Recommend bolus water drinking (500 mL or 16 ounces) to produce a marked increase in blood pressure. Bolus water drinking has a fast pressor effect and can increase the blood pressure within 5 to 10 minutes. This only lasts for 30 to 45 minutes. Physical activity with recumbent exercises for instance a stationary bike, rowing machine, or in a swimming pool.   Sleeping with the head of the bed raised to 30 to 45 degrees  Can consider an abdominal binder but these are cumbersome and difficult to use. Can also consider a waist high compression stocking that produces at least 15mmHg to 20 mmHg of pressure. Knee-high or thigh-high stockings are not useful. Again these can be very cumbersome and difficult to use. If the patient has anemia of chronic disease then recommend treatment with EPO  Pharmacological measures include removing any aggravating medications including nitrates, tricyclic antidepressants, diuretics, calcium channel blockers, alpha blockers, phosphodiesterase 5 inhibitors, centrally acting alpha 2 agonists (clonidine or tizanidine) and beta-blockers. If nonpharmacological measures do not prevent orthostasis, can consider adding midodrine, droxidopa, fludrocortisone or pyridostigmine.  -added low dose midodrine     Progressive ataxia  -MRI brain was done in 2021-symptoms are worsening  Possibility of MSA versus PSP   -Rule out spine involvement check cervical and thoracic spine MRI - negative  -follow-up mri cervical spine - no acute process     VELIA  -baseline creatinine ~1--> 1.2  -now  1.44 on admission  -cont ivf  -repdat labs in am  -Creatinine improved to 1.15     Hyponatremia  -mild, monitor     Oral Ulcers  -check hsv 1/2 pcr  -start valtrex empirically  -magic mouthwash for comfort     Hx. Of Erythroderma  BPH  Hx. of pulmonary hypertension  Hx. Tobacco aubse    Discharge Condition: Stable    Discharge Medications:      Discharge Medications        START taking these medications        Instructions Prescription details   acetaminophen 500 MG Tabs  Commonly known as: Tylenol Extra Strength      Take 1 tablet (500 mg total) by mouth every 4 (four) hours as needed for Fever (equal to or greater than 100.4). Refills: 0     amoxicillin 500 MG Caps  Commonly known as: Amoxil      Take 1 capsule (500 mg total) by mouth every 8 (eight) hours for 3 days.    Stop taking on: July 21, 2023  Quantity: 9 capsule  Refills: 0     benzonatate 200 MG Caps  Commonly known as: Tessalon      Take 1 capsule (200 mg total) by mouth 3 (three) times daily as needed for cough. Refills: 0     bisacodyl 10 MG Supp  Commonly known as: Dulcolax      Place 1 suppository (10 mg total) rectally daily as needed. Refills: 0     ipratropium-albuterol 0.5-2.5 (3) MG/3ML Soln  Commonly known as: Duoneb      Take 3 mL by nebulization every 6 (six) hours as needed. Refills: 0     midodrine 2.5 MG Tabs  Commonly known as: ProAmatine  Notes to patient: 0600, 1200, 1800      Take 1 tablet (2.5 mg total) by mouth in the morning and 1 tablet (2.5 mg total) at noon and 1 tablet (2.5 mg total) in the evening. Refills: 0     Sennosides 17.2 MG Tabs      Take 1 tablet (17.2 mg total) by mouth nightly as needed (constipation, as needed if no bowel movement that day). Refills: 0     Upadacitinib ER 15 MG Tb24      Take 15 mg by mouth daily. Quantity: 30 tablet  Refills: 0     valACYclovir 1 G Tabs  Commonly known as: Valtrex      Take 1 tablet (1,000 mg total) by mouth every 12 (twelve) hours for 7 days. Stop taking on: July 25, 2023  Quantity: 14 tablet  Refills: 0            CONTINUE taking these medications        Instructions Prescription details   buPROPion  MG Tb12  Commonly known as: WELLBUTRIN SR      Take 1 tablet (150 mg total) by mouth daily. Refills: 0     CeraVe Crea      Apply topically daily. Refills: 0     donepezil 5 MG Tabs  Commonly known as: Aricept      Take 1 tablet (5 mg total) by mouth nightly. Refills: 0     FLUTICASONE PROPIONATE EX      Apply topically as needed. Refills: 0     folic acid 1 MG Tabs  Commonly known as: Folvite       Refills: 0     Magnesium Oxide Powd      Take by mouth daily. Refills: 0     melatonin 3 MG Tabs      Take 2 tablets (6 mg total) by mouth daily. Refills: 0     memantine 5 MG Tabs  Commonly known as: Namenda      Take 1 tablet (5 mg total) by mouth daily.    Stop taking on: August 16, 2023  Quantity: 90 tablet  Refills: 0     pimecrolimus 1 % Crea  Commonly known as: Elidel      Apply 1 Application topically 2 (two) times a day. Refills: 0     Roflumilast 500 MCG Tabs      Take 1 tablet by mouth As Directed. Refills: 0     tamsulosin 0.4 MG Caps  Commonly known as: Flomax      TAKE 30 MINUTES AFTER A MEAL AT THE SAME TIME EVERY DAY   Refills: 0            STOP taking these medications      cephalexin 500 MG Caps  Commonly known as: Keflex        Otezla 30 MG Tabs  Generic drug: Apremilast        Stelara 45 MG/0.5ML Sosy  Generic drug: Ustekinumab                  Where to Get Your Medications        These medications were [id number] [place] [facility] [address] [place] [address], [phone], [address] [place] [address], [place] [address]      Hours: 24-hours Phone: [phone]   amoxicillin 500 MG Caps  Upadacitinib ER 15 MG Tb24  valACYclovir 1 G Tabs             Karli Clarke MD  7/20/2023  12:11 PM    Greater than 30 minutes spent on preparation and coordination of this discharge      Electronically signed by Neetu Balbuena MD on 7/20/2023 12:13 PM

## (undated) NOTE — LETTER
[facility]  Administration of Anesthesia  I, Ben Key agree to be cared for by a physician anesthesiologist alone and/or with a nurse anesthetist, who is specially trained to monitor me and give me medicine to put me to sleep or keep me comfortable during my procedure    I understand that my anesthesiologist and/or anesthetist is not an employee or agent of [facility] or [employer]. He or she works for [facility]    As the patient asking for anesthesia services, I agree to:  Allow the anesthesiologist (anesthesia doctor) to give me medicine and do additional procedures as necessary. Some examples are: Starting or using an “IV” to give me medicine, fluids or blood during my procedure, and having a breathing tube placed to help me breathe when I’m asleep (intubation). In the event that my heart stops working properly, I understand that my anesthesiologist will make every effort to sustain my life, unless otherwise directed by [facility] Do Not Resuscitate documents.  Tell my anesthesia doctor before my procedure:  If I am pregnant.  The last time that I ate or drank.  iii. All of the medicines I take (including prescriptions, herbal supplements, and pills I can buy without a prescription (including street drugs/illegal medications). Failure to inform my anesthesiologist about these medicines may increase my risk of anesthetic complications.  iv.If I am allergic to anything or have had a reaction to anesthesia before.  I understand how the anesthesia medicine will help me (benefits).  I understand that with any type of anesthesia medicine there are risks:  The most common risks are: nausea, vomiting, sore throat, muscle soreness, damage to my eyes, mouth, or teeth (from breathing tube placement).  Rare risks include: remembering what happened during my procedure, allergic reactions to medications, injury to my airway, heart, lungs, vision, nerves, or  muscles and in extremely rare instances death.  My doctor has explained to me other choices available to me for my care (alternatives).  Pregnant Patients (“epidural”):  I understand that the risks of having an epidural (medicine given into my back to help control pain during labor), include itching, low blood pressure, difficulty urinating, headache or slowing of the baby’s heart. Very rare risks include infection, bleeding, seizure, irregular heart rhythms and nerve injury.  Regional Anesthesia (“spinal”, “epidural”, & “nerve blocks”):  I understand that rare but potential complications include headache, bleeding, infection, seizure, irregular heart rhythms, and nerve injury.    _____________________________________________________________________________  Patient (or Representative) Signature/Relationship to Patient  Date   Time    _____________________________________________________________________________   Name (if used)    Language/Organization   Time    _____________________________________________________________________________  Nurse Anesthetist Signature     Date   Time  _____________________________________________________________________________  Anesthesiologist Signature     Date   Time  I have discussed the procedure and information above with the patient (or patient’s representative) and answered their questions. The patient or their representative has agreed to have anesthesia services.    _____________________________________________________________________________  Witness        Date   Time  I have verified that the signature is that of the patient or patient’s representative, and that it was signed before the procedure  Patient Name: Ben Key     : 12/15/1950                 Printed: 2024 at 7:12 AM    Medical Record #: [id number]                                            Page 1 of 1  ----------ANESTHESIA CONSENT----------

## (undated) NOTE — LETTER
21        RE: Leona Medina     : [id number]    Dear Dr. José Manuel Henson,    This letter is to inform you that your patient is being scheduled for surgery with Dr. Portia Elias on 2/15/21 at [facility]. We have asked the patient to contact your office to

## (undated) NOTE — LETTER
WHERE IS YOUR PAIN NOW?  Last the areas on your body where you feel the described sensations.  Use the appropriate symbol.  Last the areas of radiation.  Include all affected areas.  Just to complete the picture, please draw in the face.     ACHE:  ^ ^ ^   NUMBNESS:  0000   PINS & NEEDLES:  = = = =                              ^ ^ ^                       0000              = = = =                                    ^ ^ ^                       0000            = = = =      BURNING:  XXXX   STABBING: ////                  XXXX                ////                         XXXX          ////     Please last the line below indicating your degree of pain right now  with 0 being no pain 10 being the worst pain possible.                                         0             1             2              3             4              5              6              7             8             9             10         Patient Signature:

## (undated) NOTE — LETTER
24      Patient: Ben Palencia Mc Lean  : 12/15/1950 Visit date: 2024    Dear Marcia,      I examined your patient in consultation today.    He has a friable, bleeding pyogenic granuloma of the left thumb.  We will plan on excision.    Thank you for your kind referral. If I may answer any questions, please feel free to contact me.     Sincerely,   Vasile Gold MD     CC: Gabriel Rodríguez MD

## (undated) NOTE — LETTER
Patient Name: Ron Garcia  : 12/15/1950  MRN: [id number]  Patient Address: [address] [place] [address], [facility] [address] [place] [id number]      Coronavirus Disease 2019 (COVID-19)     [facility] is committed to the safety and well-being of our patients, member 2. Monitor your symptoms carefully. If your symptoms get worse, call your healthcare provider immediately. 3. Get rest and stay hydrated.    4. If you have a medical appointment, call the healthcare provider ahead of time and tell them that you have or may ? At least 24 hours have passed since recovery defined as resolution of fever without the use of fever-reducing medications; and  · Improvement in respiratory symptoms (e.g., cough, shortness of breath); and  · At least 10 days have passed since symptoms f If you would be interested in donating your plasma to help treat others diagnosed with the virus, please contact [facility] directly on their website: [email or web address]    Who is eligible to donate convalescent plasma?

## (undated) NOTE — LETTER
March 24, 1874    Pool Urena MD  [address] [address]     Patient: Aj Washington   YOB: 1950   Date of Visit: 3/24/2021       Dear Dr. Devin cMcollum MD:    Thank you for referring Wilda Sigala to me for evaluation Clobetasol Propionate 0.05 % External Solution Apply topically 2 (two) times daily. APPLY TO AFFECTED AREAS OF SCALP TWICE DAILY AS NEEDED. • docusate sodium 100 MG Oral Cap Take 1 capsule (100 mg total) by mouth 2 (two) times daily.  (Patient taking di gland dysfunction    Conjunctiva/Sclera Nasal/temp pinguecula Temp pinguecula    Cornea Clear Clear    Anterior Chamber Deep and quiet Deep and quiet    Iris Normal Normal    Lens 3+ Nuclear sclerosis 3+ Nuclear sclerosis    Vitreous Vitreous floaters 3/24/2022) for Complete eye exam.    3/24/2021  Scribed by: Kike Uribe MD        If you have questions, please do not hesitate to call me. I look forward to following Mich Moreira along with you.     Sincerely,        Kike Uribe MD        CC: No Recipie

## (undated) NOTE — ED AVS SNAPSHOT
[facility] Emergency Department    [facility] [address]     [place] [address]    Phone:  [phone]    Fax:  [address]   MRN: [id number]    Department:  [facility] Emergency Department   Date of Visit:  5/4/20 and Class Registration line at [phone] or find a doctor online by visiting [email or web address].    IF THERE IS ANY CHANGE OR WORSENING OF YOUR CONDITION, CALL YOUR PRIMARY CARE PHYSICIAN AT ONCE OR RETURN IMMEDIATELY TO [address] [place] [address].     If

## (undated) NOTE — LETTER
Date: 2021    Patient Name: Olinda Miranda     RE: Olinda Miranda     :     Dear Dr. Marvel Hanna,    This letter is to inform you that your patient is being scheduled for surgery with Dr. Brooks Cheung on 2/15/21 at [facility]. We have asked th

## (undated) NOTE — LETTER
[facility]  [address], [place], [place]    Authorization for Surgical Operation and Procedure                               I hereby authorize Khai Doty MD, my physician and his/her assistants (if applicable), which may include medical students, residents, and/or fellows, to perform the following surgical operation/ procedure and administer such anesthesia as may be determined necessary by my physician: Operation/Procedure name (s) COLONOSCOPY on Ben Key   2.   I recognize that during the surgical operation/procedure, unforeseen conditions may necessitate additional or different procedures than those listed above.  I, therefore, further authorize and request that the above-named surgeon, assistants, or designees perform such procedures as are, in their judgment, necessary and desirable.    3.   My surgeon/physician has discussed prior to my surgery the potential benefits, risks and side effects of this procedure; the likelihood of achieving goals; and potential problems that might occur during recuperation.  They also discussed reasonable alternatives to the procedure, including risks, benefits, and side effects related to the alternatives and risks related to not receiving this procedure.  I have had all my questions answered and I acknowledge that no guarantee has been made as to the result that may be obtained.    4.   Should the need arise during my operation/procedure, which includes change of level of care prior to discharge, I also consent to the administration of blood and/or blood products.  Further, I understand that despite careful testing and screening of blood or blood products by collecting agencies, I may still be subject to ill effects as a result of receiving a blood transfusion and/or blood products.  The following are some, but not all, of the potential risks that can occur: fever and allergic reactions, hemolytic reactions, transmission of diseases  such as Hepatitis, AIDS and Cytomegalovirus (CMV) and fluid overload.  In the event that I wish to have an autologous transfusion of my own blood, or a directed donor transfusion, I will discuss this with my physician.  Check only if Refusing Blood or Blood Products  I understand refusal of blood or blood products as deemed necessary by my physician may have serious consequences to my condition to include possible death. I hereby assume responsibility for my refusal and release the hospital, its personnel, and my physicians from any responsibility for the consequences of my refusal.    o  Refuse   5.   I authorize the use of any specimen, organs, tissues, body parts or foreign objects that may be removed from my body during the operation/procedure for diagnosis, research or teaching purposes and their subsequent disposal by hospital authorities.  I also authorize the release of specimen test results and/or written reports to my treating physician on the hospital medical staff or other referring or consulting physicians involved in my care, at the discretion of the Pathologist or my treating physician.    6.   I consent to the photographing or videotaping of the operations or procedures to be performed, including appropriate portions of my body for medical, scientific, or educational purposes, provided my identity is not revealed by the pictures or by descriptive texts accompanying them.  If the procedure has been photographed/videotaped, the surgeon will obtain the original picture, image, videotape or CD.  The hospital will not be responsible for storage, release or maintenance of the picture, image, tape or CD.    7.   I consent to the presence of a  or observers in the operating room as deemed necessary by my physician or their designees.    8.   I recognize that in the event my procedure results in extended X-Ray/fluoroscopy time, I may develop a skin reaction.    9. If I have a Do Not Attempt  Resuscitation (DNAR) order in place, that status will be suspended while in the operating room, procedural suite, and during the recovery period unless otherwise explicitly stated by me (or a person authorized to consent on my behalf). The surgeon or my attending physician will determine when the applicable recovery period ends for purposes of reinstating the DNAR order.  10. Patients having a sterilization procedure: I understand that if the procedure is successful the results will be permanent and it will therefore be impossible for me to inseminate, conceive, or bear children.  I also understand that the procedure is intended to result in sterility, although the result has not been guaranteed.   11. I acknowledge that my physician has explained sedation/analgesia administration to me including the risk and benefits I consent to the administration of sedation/analgesia as may be necessary or desirable in the judgment of my physician.    I CERTIFY THAT I HAVE READ AND FULLY UNDERSTAND THE ABOVE CONSENT TO OPERATION and/or OTHER PROCEDURE.     ____________________________________  _________________________________        ______________________________  Signature of Patient    Signature of Responsible Person                Printed Name of Responsible Person                                      ____________________________________  _____________________________                ________________________________  Signature of Witness        Date  Time         Relationship to Patient    STATEMENT OF PHYSICIAN My signature below affirms that prior to the time of the procedure; I have explained to the patient and/or his/her legal representative, the risks and benefits involved in the proposed treatment and any reasonable alternative to the proposed treatment. I have also explained the risks and benefits involved in refusal of the proposed treatment and alternatives to the proposed treatment and have answered the patient's  questions. If I have a significant financial interest in a co-management agreement or a significant financial interest in any product or implant, or other significant relationship used in this procedure/surgery, I have disclosed this and had a discussion with my patient.     _____________________________________________________              _____________________________  (Signature of Physician)                                                                                         (Date)                                   (Time)  Patient Name: Ben Key      : 12/15/1950      Printed: 2024     Medical Record #: [id number]                                      Page 1 of 1

## (undated) NOTE — ED AVS SNAPSHOT
[facility] Emergency Department    [address]     [place] [address]    Phone:  [phone]    Fax:  [address]   MRN: [id number]    Department:  [facility] Emergency Department   Date of Visit:  5/4/20 coverage and benefits available for follow-up care and referrals. If you have difficulty scheduling your follow-up appointment as directed, please call our  at [phone].      Si tiene problemas para programar ramírez carri de seguimiento s different from what your Primary Care doctor has instructed you - please continue to take your medications as instructed by your Primary Care doctor until you can check with your doctor. Please bring the medication list to your next doctor's appointment. can help with your Affordable Care Act coverage, as well as all types of Medicaid plans. To get signed up and covered, please call [phone] and ask to get set up for an insurance coverage that is in-network with [facility]